# Patient Record
Sex: MALE | Race: WHITE | NOT HISPANIC OR LATINO | URBAN - METROPOLITAN AREA
[De-identification: names, ages, dates, MRNs, and addresses within clinical notes are randomized per-mention and may not be internally consistent; named-entity substitution may affect disease eponyms.]

---

## 2018-03-15 ENCOUNTER — EMERGENCY (EMERGENCY)
Facility: HOSPITAL | Age: 48
LOS: 1 days | Discharge: HOME | End: 2018-03-15
Attending: EMERGENCY MEDICINE | Admitting: EMERGENCY MEDICINE
Payer: MEDICAID

## 2018-03-15 VITALS
RESPIRATION RATE: 17 BRPM | WEIGHT: 199.96 LBS | TEMPERATURE: 99 F | SYSTOLIC BLOOD PRESSURE: 140 MMHG | HEART RATE: 115 BPM | OXYGEN SATURATION: 96 % | DIASTOLIC BLOOD PRESSURE: 90 MMHG | HEIGHT: 73 IN

## 2018-03-15 PROCEDURE — 99283 EMERGENCY DEPT VISIT LOW MDM: CPT

## 2018-03-15 PROCEDURE — 99282 EMERGENCY DEPT VISIT SF MDM: CPT

## 2018-03-15 RX ORDER — MECLIZINE HCL 12.5 MG
1 TABLET ORAL
Qty: 9 | Refills: 0
Start: 2018-03-15 | End: 2018-03-17
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
## 2018-03-15 NOTE — ED PROVIDER NOTE - PHYSICAL EXAMINATION
Attending MD Moore: A & O x 3, NAD, +mild swelling to mid nasal bridge, healed 1cm abrasion, no septal hematoma, b/l, no bony facial ttp, nontender spine, nontender chest wall, EOMI b/l, PERRL b/l; lungs CTAB, heart with reg rhythm without murmur; abdomen soft NTND; extremities with no edema; affect appropriate,  Cranial nerves two through 12 grossly intact bilaterally. 5/5 strength in bilateral upper and lower extremities. Sensation intact grossly to light touch throughout,  Finger to nose and heel to shin normal bilaterally. Gait steady.
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
## 2018-03-15 NOTE — ED PROVIDER NOTE - ATTENDING CONTRIBUTION TO CARE
Attending MD Moore:  I personally have seen and examined this patient.  Resident note reviewed and agree on plan of care and except where noted.  See HPI, PE, and MDM for details.
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
## 2018-03-15 NOTE — ED PROVIDER NOTE - PLAN OF CARE
1) Please follow-up with your primary care doctor within the next week if symptoms persist. If you would like to followup for cosmetic repair of your face, feel free to call our plastic surgery clinic (number attached).  If you cannot follow-up with your doctor(s), please return to the ED for any urgent issues.  2) If you have any worsening of symptoms or any other concerns please return to the ED immediately.  3) Please continue taking your home medications as directed.
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
## 2018-09-24 ENCOUNTER — OUTPATIENT (OUTPATIENT)
Dept: OUTPATIENT SERVICES | Facility: HOSPITAL | Age: 48
LOS: 1 days | End: 2018-09-24
Payer: MEDICAID

## 2018-09-24 DIAGNOSIS — M54.16 RADICULOPATHY, LUMBAR REGION: ICD-10-CM

## 2018-09-24 PROCEDURE — 62323 NJX INTERLAMINAR LMBR/SAC: CPT

## 2018-09-24 PROCEDURE — 77003 FLUOROGUIDE FOR SPINE INJECT: CPT

## 2018-10-11 ENCOUNTER — OUTPATIENT (OUTPATIENT)
Dept: OUTPATIENT SERVICES | Facility: HOSPITAL | Age: 48
LOS: 1 days | End: 2018-10-11
Payer: MEDICAID

## 2018-10-11 DIAGNOSIS — M54.16 RADICULOPATHY, LUMBAR REGION: ICD-10-CM

## 2018-10-11 PROCEDURE — 62323 NJX INTERLAMINAR LMBR/SAC: CPT

## 2018-10-11 PROCEDURE — 77003 FLUOROGUIDE FOR SPINE INJECT: CPT

## 2018-11-01 ENCOUNTER — OUTPATIENT (OUTPATIENT)
Dept: OUTPATIENT SERVICES | Facility: HOSPITAL | Age: 48
LOS: 1 days | End: 2018-11-01
Payer: MEDICAID

## 2018-11-01 DIAGNOSIS — M54.16 RADICULOPATHY, LUMBAR REGION: ICD-10-CM

## 2018-11-01 PROCEDURE — 77003 FLUOROGUIDE FOR SPINE INJECT: CPT

## 2018-11-01 PROCEDURE — 62323 NJX INTERLAMINAR LMBR/SAC: CPT

## 2019-10-28 ENCOUNTER — OUTPATIENT (OUTPATIENT)
Dept: OUTPATIENT SERVICES | Facility: HOSPITAL | Age: 49
LOS: 1 days | Discharge: HOME | End: 2019-10-28
Payer: COMMERCIAL

## 2019-10-28 DIAGNOSIS — M54.12 RADICULOPATHY, CERVICAL REGION: ICD-10-CM

## 2019-10-28 DIAGNOSIS — M50.10 CERVICAL DISC DISORDER WITH RADICULOPATHY, UNSPECIFIED CERVICAL REGION: ICD-10-CM

## 2019-10-28 PROCEDURE — 77003 FLUOROGUIDE FOR SPINE INJECT: CPT

## 2019-10-28 PROCEDURE — 62321 NJX INTERLAMINAR CRV/THRC: CPT

## 2019-12-09 ENCOUNTER — OUTPATIENT (OUTPATIENT)
Dept: OUTPATIENT SERVICES | Facility: HOSPITAL | Age: 49
LOS: 1 days | End: 2019-12-09
Payer: COMMERCIAL

## 2019-12-09 DIAGNOSIS — M54.12 RADICULOPATHY, CERVICAL REGION: ICD-10-CM

## 2019-12-09 PROCEDURE — 62321 NJX INTERLAMINAR CRV/THRC: CPT

## 2019-12-09 PROCEDURE — 77003 FLUOROGUIDE FOR SPINE INJECT: CPT

## 2020-01-30 ENCOUNTER — OUTPATIENT (OUTPATIENT)
Dept: OUTPATIENT SERVICES | Facility: HOSPITAL | Age: 50
LOS: 1 days | End: 2020-01-30
Payer: COMMERCIAL

## 2020-01-30 DIAGNOSIS — M54.12 RADICULOPATHY, CERVICAL REGION: ICD-10-CM

## 2020-01-30 PROCEDURE — 62321 NJX INTERLAMINAR CRV/THRC: CPT

## 2020-01-30 PROCEDURE — 77003 FLUOROGUIDE FOR SPINE INJECT: CPT

## 2020-04-17 ENCOUNTER — EMERGENCY (EMERGENCY)
Facility: HOSPITAL | Age: 50
LOS: 1 days | Discharge: HOME | End: 2020-04-17
Attending: EMERGENCY MEDICINE
Payer: COMMERCIAL

## 2020-04-17 VITALS
HEART RATE: 93 BPM | TEMPERATURE: 98 F | OXYGEN SATURATION: 100 % | DIASTOLIC BLOOD PRESSURE: 82 MMHG | RESPIRATION RATE: 18 BRPM | HEIGHT: 73 IN | WEIGHT: 184.97 LBS | SYSTOLIC BLOOD PRESSURE: 123 MMHG

## 2020-04-17 VITALS
OXYGEN SATURATION: 100 % | HEART RATE: 83 BPM | SYSTOLIC BLOOD PRESSURE: 128 MMHG | RESPIRATION RATE: 16 BRPM | DIASTOLIC BLOOD PRESSURE: 99 MMHG

## 2020-04-17 PROCEDURE — 99283 EMERGENCY DEPT VISIT LOW MDM: CPT | Mod: 25

## 2020-04-17 PROCEDURE — 96372 THER/PROPH/DIAG INJ SC/IM: CPT

## 2020-04-17 PROCEDURE — 99284 EMERGENCY DEPT VISIT MOD MDM: CPT

## 2020-04-17 RX ORDER — OXYCODONE HYDROCHLORIDE 5 MG/1
1 TABLET ORAL
Qty: 8 | Refills: 0
Start: 2020-04-17 | End: 2020-04-18

## 2020-04-17 RX ORDER — DIAZEPAM 5 MG
5 TABLET ORAL ONCE
Refills: 0 | Status: DISCONTINUED | OUTPATIENT
Start: 2020-04-17 | End: 2020-04-17

## 2020-04-17 RX ORDER — KETOROLAC TROMETHAMINE 30 MG/ML
15 SYRINGE (ML) INJECTION ONCE
Refills: 0 | Status: DISCONTINUED | OUTPATIENT
Start: 2020-04-17 | End: 2020-04-17

## 2020-04-17 RX ORDER — OXYCODONE HYDROCHLORIDE 5 MG/1
5 TABLET ORAL ONCE
Refills: 0 | Status: DISCONTINUED | OUTPATIENT
Start: 2020-04-17 | End: 2020-04-17

## 2020-04-17 RX ORDER — IBUPROFEN 200 MG
400 TABLET ORAL ONCE
Refills: 0 | Status: DISCONTINUED | OUTPATIENT
Start: 2020-04-17 | End: 2020-04-17

## 2020-04-17 RX ORDER — LIDOCAINE 4 G/100G
1 CREAM TOPICAL ONCE
Refills: 0 | Status: COMPLETED | OUTPATIENT
Start: 2020-04-17 | End: 2020-04-17

## 2020-04-17 RX ADMIN — Medication 5 MILLIGRAM(S): at 16:03

## 2020-04-17 RX ADMIN — OXYCODONE HYDROCHLORIDE 5 MILLIGRAM(S): 5 TABLET ORAL at 16:36

## 2020-04-17 RX ADMIN — Medication 15 MILLIGRAM(S): at 16:12

## 2020-04-17 RX ADMIN — LIDOCAINE 1 PATCH: 4 CREAM TOPICAL at 16:02

## 2020-04-17 NOTE — ED PROVIDER NOTE - ATTENDING CONTRIBUTION TO CARE
pt transported to L&D via wheelchair with discharge instructions given by MD in ER
RGUJRAL 50yo male hx HTN, cervical herniated disc followed outpt by pain management, evaluated by spine choosing the non surgical route presents with L side neck pain and numbness x several days. States he usually gets an epidural for such symptoms but due to covid restrictions, he has not been able to get an appt. Denies any arm weakness, change in bladder or bowel. No fever.  ON exam, Patient is awake,alert,oriented x 3. Patient is well appearing and in no acute distress. Neck + paraspinal tenderness.  Patient's chest is clear to ausculation, +s1s2. Abdomen is soft nd/nt +BS. Extremity with no swelling or calf tenderness. B/L UE normal sensation 5/5 strength. + radial pulse.  Pt neuro intact, pain meds and re eval. Discussed w pt regarding pain management and spine follow up. MRI reviewed.

## 2020-04-17 NOTE — ED PROVIDER NOTE - OBJECTIVE STATEMENT
48yo male pt with PMHx of HTN, Cervical Herniated Disc (on f/u with pain management and Gabapentin/ Flexeril), ambulatory c/o worsening neck pain, radiating ot left arm. Pt stated he's had chronic neck pain and the pain's gradually worsen since several days. He had epidural injection by his pain management  one month ago and unable to get another injection due to covid pandemic. Denies fever, chills, cough or recent cold symptoms. Denies CP/SOB/ABD pain or N/V/D. Denies weakness to extremities. Denies urinary problems.

## 2020-04-17 NOTE — ED PROVIDER NOTE - NSFOLLOWUPINSTRUCTIONS_ED_ALL_ED_FT
Hydrate.  Keep continue your current medications, including Gabapentin/ flexeril as directed.  Oxycodone 1 tablet every 6hours for severe pain with cautions of drowsiness and constipation.  Stool softener as needed.  Salonpas with Lidocaine patch (over the count) to pain area as needed.   Follow up with your pain management Dr. call Monday for appointment in 2days.   Return for any concerns or worsening [id number].

## 2020-04-17 NOTE — ED PROVIDER NOTE - PATIENT PORTAL LINK FT
You can access the FollowMyHealth Patient Portal offered by [facility] by registering at the following website: [email or web address]. By joining [employer]’s FollowMyHealth portal, you will also be able to view your health information using other applications (apps) compatible with our system.

## 2020-04-17 NOTE — ED ADULT NURSE NOTE - OBJECTIVE STATEMENT
48 y/o male presents c/o neck pain that radiates to his left shoulder and woke him up from sleep. Numbness and tingling to left hand. Hx of this happening chronically and is unable to see his pain management MD for epidurals. Denies chest pain, sob, ha, n/v/d, abdominal pain, f/c, urinary symptoms, hematuria. A&Ox4, vss, skin warm dry and intact, MAEx4, lungs CTA, abd soft nondistended. Patient's bed in the lowest position, explained plan of care to patient and family members. Will continue to reassess.

## 2020-04-17 NOTE — ED PROVIDER NOTE - PHYSICAL EXAMINATION
NAD, VSS, Afebrile, + Generalized cervical midline tender with left para cervical/ cervical trapezium tender. Lungs clear. ABD soft, non tender. No focal neuro deficit with intact sensory and 5/5 strength.

## 2020-11-01 NOTE — ED PROVIDER NOTE - GASTROINTESTINAL, MLM
[facility]  Transfer Triage Note    Date of call: 11/01/20  Time of call: 1:32 PM    Is pandemic COVID-19 a concern? YES:   1.  Travel history/exposure history (select all that apply): other   2.  Vitals:   Temp:  [98.7  F (37.1  C)-103.1  F (39.5  C)] 98.7  F (37.1  C)  Pulse:  [65-98] 79  Resp:  [18] 18  BP: (114-156)/(68-86) 135/86  SpO2:  [89 %-97 %] 94 %  3.  On Oxygen? (select one option):  NC L/minute 2  4.  History of lung disease or active smoking (or other risk factors for death/respiratory failure?: Yes: SCOTT, obesity, HTN  5.  Type of bed requested (select one option): med/surg  6.  Other Isolation needed (select all that apply): COVID precautions  7.  Has the patient been added to the shared patient list 'COVID'?  Yes      Reason for transfer: COVID  Diagnosis: COVID 19    Outside Records: Available  Additional records requested to be faxed to [phone].    Stability of Patient: Patient is vitally stable, with no critical labs, and will likely remain stable throughout the transfer process  ICU: No  Telemetry needed: No    Expected Time of Arrival for Transfer: 0-8 hours    Arrival Location:  [facility] [address] [place] [address] Phone: [phone]    Recommendations for Management and Stabilization: ECG and repeat troponin      Eliseo Oneal is a 56 year old male with history of obesity, hypertension, and SCOTT who presented to the [facility] ED on 10/30 with 4 days of fever to 103.1, cough, and SOB and other associated symptoms including fatigue and upset stomach. His wife did have COVID infection In July. CT in the ED showed bilateral infiltrates consistent with pneumonitits. Labs on presentation significant for leukopenia with WBC 4.5, d-dimer 2.2, GFR 66, and calcium 8.2 with albuminemia to 2.6 and low total protein. COVID PCR positive on 10/30. 10/31 troponin was 0.047, AST 53, WBC 3.8, D-dimer 1.7. Today he is hemodynamically stable and cooperative. Currently on 2L  oxygen. Labs 11/1 significant for improved leukopenia WBC 6, GFR 77, D-dimer 1.2. Not currently started on Remdesivir. Very mild troponin elevation on admission to 0.047 (upper limit of normal w lab at transferring hospital is 0.045), Pt has been chest pain free entire hospitalization per transferring provider.    Requested EKG and repeat troponin and repeat EKG reported by transferring provider as without concerning findings or evidence of ischemia and repeat troponin returned within normal limits. Pt is  appropriate for transfer to [place] to med/surg bed without tele.    Patient signed out to Dr. Michael by phone.    Lissa Stark, MS4  [employer]    I have edited the above note.   Jasmin Spain MD           Abdomen soft, non-tender, no guarding.

## 2022-08-25 ENCOUNTER — TRANSCRIPTION ENCOUNTER (OUTPATIENT)
Age: 52
End: 2022-08-25

## 2022-08-25 ENCOUNTER — OUTPATIENT (OUTPATIENT)
Dept: OUTPATIENT SERVICES | Facility: HOSPITAL | Age: 52
LOS: 1 days | End: 2022-08-25
Payer: MEDICAID

## 2022-08-25 VITALS
HEIGHT: 71.5 IN | HEART RATE: 89 BPM | TEMPERATURE: 97 F | DIASTOLIC BLOOD PRESSURE: 87 MMHG | WEIGHT: 197.09 LBS | RESPIRATION RATE: 14 BRPM | SYSTOLIC BLOOD PRESSURE: 124 MMHG | OXYGEN SATURATION: 97 %

## 2022-08-25 DIAGNOSIS — K43.9 VENTRAL HERNIA WITHOUT OBSTRUCTION OR GANGRENE: ICD-10-CM

## 2022-08-25 DIAGNOSIS — Z98.890 OTHER SPECIFIED POSTPROCEDURAL STATES: Chronic | ICD-10-CM

## 2022-08-25 DIAGNOSIS — M51.24 OTHER INTERVERTEBRAL DISC DISPLACEMENT, THORACIC REGION: Chronic | ICD-10-CM

## 2022-08-25 DIAGNOSIS — K42.9 UMBILICAL HERNIA WITHOUT OBSTRUCTION OR GANGRENE: ICD-10-CM

## 2022-08-25 DIAGNOSIS — Z86.79 PERSONAL HISTORY OF OTHER DISEASES OF THE CIRCULATORY SYSTEM: Chronic | ICD-10-CM

## 2022-08-25 DIAGNOSIS — M50.20 OTHER CERVICAL DISC DISPLACEMENT, UNSPECIFIED CERVICAL REGION: Chronic | ICD-10-CM

## 2022-08-25 DIAGNOSIS — M51.26 OTHER INTERVERTEBRAL DISC DISPLACEMENT, LUMBAR REGION: Chronic | ICD-10-CM

## 2022-08-25 DIAGNOSIS — Z01.818 ENCOUNTER FOR OTHER PREPROCEDURAL EXAMINATION: ICD-10-CM

## 2022-08-25 LAB
ALBUMIN SERPL ELPH-MCNC: 4 G/DL — SIGNIFICANT CHANGE UP (ref 3.3–5)
ALP SERPL-CCNC: 79 U/L — SIGNIFICANT CHANGE UP (ref 30–120)
ALT FLD-CCNC: 77 U/L DA — HIGH (ref 10–60)
ANION GAP SERPL CALC-SCNC: 4 MMOL/L — LOW (ref 5–17)
APTT BLD: 30.5 SEC — SIGNIFICANT CHANGE UP (ref 27.5–35.5)
AST SERPL-CCNC: 34 U/L — SIGNIFICANT CHANGE UP (ref 10–40)
BILIRUB SERPL-MCNC: 1 MG/DL — SIGNIFICANT CHANGE UP (ref 0.2–1.2)
BUN SERPL-MCNC: 20 MG/DL — SIGNIFICANT CHANGE UP (ref 7–23)
CALCIUM SERPL-MCNC: 9.3 MG/DL — SIGNIFICANT CHANGE UP (ref 8.4–10.5)
CHLORIDE SERPL-SCNC: 101 MMOL/L — SIGNIFICANT CHANGE UP (ref 96–108)
CO2 SERPL-SCNC: 32 MMOL/L — HIGH (ref 22–31)
CREAT SERPL-MCNC: 0.99 MG/DL — SIGNIFICANT CHANGE UP (ref 0.5–1.3)
EGFR: 91 ML/MIN/1.73M2 — SIGNIFICANT CHANGE UP
GLUCOSE SERPL-MCNC: 101 MG/DL — HIGH (ref 70–99)
HCT VFR BLD CALC: 43.2 % — SIGNIFICANT CHANGE UP (ref 39–50)
HGB BLD-MCNC: 14.2 G/DL — SIGNIFICANT CHANGE UP (ref 13–17)
INR BLD: 0.85 RATIO — LOW (ref 0.88–1.16)
MCHC RBC-ENTMCNC: 32.3 PG — SIGNIFICANT CHANGE UP (ref 27–34)
MCHC RBC-ENTMCNC: 32.9 GM/DL — SIGNIFICANT CHANGE UP (ref 32–36)
MCV RBC AUTO: 98.4 FL — SIGNIFICANT CHANGE UP (ref 80–100)
NRBC # BLD: 0 /100 WBCS — SIGNIFICANT CHANGE UP (ref 0–0)
PLATELET # BLD AUTO: 320 K/UL — SIGNIFICANT CHANGE UP (ref 150–400)
POTASSIUM SERPL-MCNC: 5.1 MMOL/L — SIGNIFICANT CHANGE UP (ref 3.5–5.3)
POTASSIUM SERPL-SCNC: 5.1 MMOL/L — SIGNIFICANT CHANGE UP (ref 3.5–5.3)
PROT SERPL-MCNC: 7.4 G/DL — SIGNIFICANT CHANGE UP (ref 6–8.3)
PROTHROM AB SERPL-ACNC: 9.8 SEC — LOW (ref 10.5–13.4)
RBC # BLD: 4.39 M/UL — SIGNIFICANT CHANGE UP (ref 4.2–5.8)
RBC # FLD: 12 % — SIGNIFICANT CHANGE UP (ref 10.3–14.5)
SODIUM SERPL-SCNC: 137 MMOL/L — SIGNIFICANT CHANGE UP (ref 135–145)
WBC # BLD: 5.97 K/UL — SIGNIFICANT CHANGE UP (ref 3.8–10.5)
WBC # FLD AUTO: 5.97 K/UL — SIGNIFICANT CHANGE UP (ref 3.8–10.5)

## 2022-08-25 PROCEDURE — 93005 ELECTROCARDIOGRAM TRACING: CPT

## 2022-08-25 PROCEDURE — 85027 COMPLETE CBC AUTOMATED: CPT

## 2022-08-25 PROCEDURE — G0463: CPT

## 2022-08-25 PROCEDURE — 36415 COLL VENOUS BLD VENIPUNCTURE: CPT

## 2022-08-25 PROCEDURE — 85730 THROMBOPLASTIN TIME PARTIAL: CPT

## 2022-08-25 PROCEDURE — 93010 ELECTROCARDIOGRAM REPORT: CPT

## 2022-08-25 PROCEDURE — 80053 COMPREHEN METABOLIC PANEL: CPT

## 2022-08-25 PROCEDURE — 85610 PROTHROMBIN TIME: CPT

## 2022-08-25 RX ORDER — CHLORHEXIDINE GLUCONATE 213 G/1000ML
1 SOLUTION TOPICAL ONCE
Refills: 0 | Status: DISCONTINUED | OUTPATIENT
Start: 2022-09-08 | End: 2022-09-22

## 2022-08-25 RX ORDER — CEFAZOLIN SODIUM 1 G
2000 VIAL (EA) INJECTION ONCE
Refills: 0 | Status: DISCONTINUED | OUTPATIENT
Start: 2022-09-08 | End: 2022-09-22

## 2022-08-25 RX ORDER — ASPIRIN/CALCIUM CARB/MAGNESIUM 324 MG
1 TABLET ORAL
Qty: 0 | Refills: 0 | DISCHARGE

## 2022-08-25 NOTE — ASU DISCHARGE PLAN (ADULT/PEDIATRIC) - NS MD DC FALL RISK RISK
For information on Fall & Injury Prevention, visit: [email or web address] OR  [email or web address] OR  https://www.cdc.gov/steadi/patient.html

## 2022-08-25 NOTE — H&P PST ADULT - NS MD HP HEP C STATUS
Patient is due for CBC and CMP as per NSAID protocol. He will come in for lab work 4/9/22. Refill sent for 30 days.    Negative

## 2022-08-25 NOTE — H&P PST ADULT - HISTORY OF PRESENT ILLNESS
51 yo male presents with 1 1/2 year history of known umbilical hernia. He denies change in size but states that the area in now tender to touch. He denies using any pain relief measures for this. 53 yo male presents with 1 1/2 year history of known umbilical hernia. He denies change in size but states that the area in now tender to touch. He denies using any pain relief measures for this. He has chronic back pain and takes medication for that.

## 2022-08-25 NOTE — ASU DISCHARGE PLAN (ADULT/PEDIATRIC) - ASU DC SPECIAL INSTRUCTIONSFT
REST! Apply ice packs to incision sites 20 mins on, 20 mins off every 4 hours for the first 24 hours.    If taking narcotic pain medications, may take COLACE (OTC stool softener) as directed to prevent constipation.    Increase ambulation and utilize incentive spirometer as directed.    Please call Dr. BUCK Interiano's office ([phone]) to schedule a follow-up appointment to be seen in 7-10 days.

## 2022-08-25 NOTE — H&P PST ADULT - PROBLEM SELECTOR PLAN 1
ventral hernia repair with mesh. medical clearance requested. obtain last note from neurology and pain management. instructed to stop aspirin, CoQ10, turmeric, MVI and omega-3 1 week prior to surgery. instructed to speak to PCP about aspirin use and increased bruising on arms. surgical wash instructions reviewed and verbalized understanding. covid PCR appt. confirmed for 9/6/22 at 1200.

## 2022-08-25 NOTE — H&P PST ADULT - NSICDXFAMILYHX_GEN_ALL_CORE_FT
FAMILY HISTORY:  Father  Still living? No  Family history of hypertension, Age at diagnosis: Age Unknown    Mother  Still living? Yes, Estimated age: 81-90  Family history of hypertension, Age at diagnosis: Age Unknown

## 2022-08-25 NOTE — H&P PST ADULT - NSANTHOSAYNRD_GEN_A_CORE
neck inches/No. SANTOS screening performed.  STOP BANG Legend: 0-2 = LOW Risk; 3-4 = INTERMEDIATE Risk; 5-8 = HIGH Risk neck 16.5 inches/No. SANTOS screening performed.  STOP BANG Legend: 0-2 = LOW Risk; 3-4 = INTERMEDIATE Risk; 5-8 = HIGH Risk

## 2022-08-25 NOTE — H&P PST ADULT - VENOUS THROMBOEMBOLISM HISTORY
MEDICARE WELLNESS VISIT NOTE      HISTORY OF PRESENT ILLNESS:   Pablo Lucas presents for his First Annual Medicare Wellness Visit.   He has complaints or concerns which include   Chief Complaint   Patient presents with   • ER F/U     ER 9/17/17  had xrays, no fxs - dropped mantle on both feet.  left toes bruised,   right big toe red and oozing.    • Medicare Wellness Visit     initial MWV   .      Patient Care Team:  Dario Linder MD as PCP - General (Internal Medicine)        Patient Active Problem List    Diagnosis Date Noted   • Anxiety 09/22/2014     Priority: Low   • Hyperlipidemia 09/22/2014     Priority: Low         Past Medical History:   Diagnosis Date   • High cholesterol          Past Surgical History:   Procedure Laterality Date   • NO PAST SURGERIES           Social History   Substance Use Topics   • Smoking status: Never Smoker   • Smokeless tobacco: Never Used   • Alcohol use No     Drug use:    Drug Use:    No              Family History - Reviewed    Current Outpatient Prescriptions   Medication Sig Dispense Refill   • montelukast (SINGULAIR) 10 MG tablet TAKE 1 TABLET BY MOUTH EVERY EVENING 30 tablet 5   • metFORMIN (GLUCOPHAGE) 500 MG tablet TAKE 1 TABLET BY MOUTH TWICE DAILY WITH MEALS 60 tablet 1   • loratadine (CLARITIN) 10 MG tablet TAKE 1 TABLET BY MOUTH DAILY 30 tablet 5   • simvastatin (ZOCOR) 20 MG tablet TAKE 1 TABLET BY MOUTH DAILY 90 tablet 0   • losartan (COZAAR) 50 MG tablet Take 1 tablet by mouth daily. 90 tablet 1   • albuterol 108 (90 BASE) MCG/ACT inhaler Inhale 2 puffs into the lungs every 4 hours as needed for Shortness of Breath or Wheezing. 1 Inhaler 6   • naproxen sodium (ALEVE) 220 MG tablet Take 1 tablet by mouth 2 times daily (with meals). 60 tablet 0   • fluticasone (FLONASE) 50 MCG/ACT nasal spray Spray 1 spray in each nostril daily. 16 g 6     No current facility-administered medications for this visit.         Medicare Health Risk Assessment in electronic  health record reviewed. The following items were identified and addressed:      Vision and hearing screens documented:    Advanced Directive: Patient doesn't have an Advance Directives document, and is not interested in additional information  Cognitive Assessment: no evidence of cognitive dysfunction by direct observation      See orders.   See Patient Instructions section.   No Follow-up on file.  Do you have an Advanced Directive? NO    Would you like additional information on Advanced Directives? No has the information   How would you rate your health in general? good    What word best describes your health? healthy    Are you physically able to do what you want to do every day? always    In the past 2 weeks, have you felt down, depressed or hopeless? (PHQ2) no    Felt little interest or pleasure in doing things? (PHQ2) no    In the past 4 weeks, has your physical or emotional health limited your social activities with others? no    Have you recently been bothered by sexual problems? no    Have you recently been bothered by tiredness or fatigue? no    Have you recently been bothered by bodily pain? no    Have you recently been bothered by stress/feeling overwhelmed? no    Have you recently been bothered by anger/frustration? no    Do you have a problem taking your medications? no    Do you eat less than 2-3 meals a day? no    Do you follow a special diet? no    Do you need help to prepare your meals? no    Do you need help to feed yourself? no    Do you have any problems with your teeth or dentures? no    Do you need help to bathe yourself? no    Do you need help to dress yourself? no    Do you need help to move in or out of bed or a chair? no    Do you need help to use the toilet? no    Do you have trouble controlling your bowels? no    Do you regularly get physical activity? yes    Have you had any recent falls or are you concerned about falling? no    Do you need help to get to places outside of walking  distance? no    Do you need help to go shopping for groceries or clothes? no    Do you need help to do your housework or laundry? no    Do you need help to handle your own money? no    Do you feel safe at home? yes    Do you have questions or concerns about your hearing? no    Do you have a problem using the telephone? no    Do you wear your seat belt when in a vehicle? yes         (0) indicator not present

## 2022-08-25 NOTE — H&P PST ADULT - MUSCULOSKELETAL
ROM intact/no joint swelling/no joint erythema/no joint warmth/no calf tenderness/normal gait/strength 5/5 bilateral upper extremities/strength 5/5 bilateral lower extremities/back exam details…

## 2022-08-25 NOTE — H&P PST ADULT - NSICDXPASTSURGICALHX_GEN_ALL_CORE_FT
PAST SURGICAL HISTORY:  H/O arthroscopy of left knee 2021    H/O arthroscopy of right knee 2020    H/O right inguinal hernia repair 1995    History of cystoscopy stone extraction 2011    History of Mohs surgery for squamous cell carcinoma of skin x2 April 2021 forehead and April 2022 left hand

## 2022-08-25 NOTE — ASU DISCHARGE PLAN (ADULT/PEDIATRIC) - CARE PROVIDER_API CALL
Davi Interiano (MD)  Surgery; Surgical Critical Care  [facility], [address] [place] [address]  Phone: [phone]  Fax: [phone]  Follow Up Time:

## 2022-08-25 NOTE — H&P PST ADULT - NSICDXPASTMEDICALHX_GEN_ALL_CORE_FT
PAST MEDICAL HISTORY:  Anxiety     Cervical herniated disc x5, C3-4, C4-5, C5-6, C6-7, C7-T1    Concussion x 12,  in the past all from sports related injuries    DDD (degenerative disc disease), lumbar     Elevated liver enzymes history of, related to percocet use    History of 2019 novel coronavirus disease (COVID-19) December 2020 and December 2021, mild symptoms    History of headache     History of scoliosis     History of skin cancer squamos cell    History of thyroid nodule followed with ultrasound    History of vertigo     Hypertension     Liver cyst incidental finding    Lumbar herniated disc x5, L1-2. L2-3, L3-4, L4-5 and L5-S1    Osteoarthritis     Post concussion syndrome     Renal Calculi 2011 had a cystoscopy and since then has passed 2 on his own    S/P epidural steroid injection multiple, last August 8/10/22    Thoracic disc herniation x2, T7 and T8 and disc bulge at T11    Umbilical hernia      PAST MEDICAL HISTORY:  Anxiety     Cervical herniated disc x5, C3-4, C4-5, C5-6, C6-7, C7-T1    Concussion x 12,  in the past all from sports related injuries    DDD (degenerative disc disease), lumbar     Elevated liver enzymes history of, related to percocet use    History of 2019 novel coronavirus disease (COVID-19) December 2020 and December 2021, mild symptoms    History of headache     History of scoliosis     History of skin cancer squamos cell    History of thyroid nodule followed with ultrasound    History of vertigo     Hypertension     Liver cyst incidental finding    Lumbar herniated disc x5, L1-2. L2-3, L3-4, L4-5 and L5-S1, medrol dose pack June 2022    Osteoarthritis     Post concussion syndrome     Renal Calculi 2011 had a cystoscopy and since then has passed 2 on his own    S/P epidural steroid injection multiple, last August 8/10/22    Thoracic disc herniation x2, T7 and T8 and disc bulge at T11    Umbilical hernia

## 2022-08-25 NOTE — H&P PST ADULT - SKIN/BREAST COMMENTS
multiple bruises on upper extremities, patient states that he has noted them since starting 325 mg aspirin for pain management

## 2022-09-06 ENCOUNTER — OUTPATIENT (OUTPATIENT)
Dept: OUTPATIENT SERVICES | Facility: HOSPITAL | Age: 52
LOS: 1 days | End: 2022-09-06
Payer: MEDICAID

## 2022-09-06 DIAGNOSIS — Z98.890 OTHER SPECIFIED POSTPROCEDURAL STATES: Chronic | ICD-10-CM

## 2022-09-06 DIAGNOSIS — Z20.828 CONTACT WITH AND (SUSPECTED) EXPOSURE TO OTHER VIRAL COMMUNICABLE DISEASES: ICD-10-CM

## 2022-09-06 LAB — SARS-COV-2 RNA SPEC QL NAA+PROBE: SIGNIFICANT CHANGE UP

## 2022-09-06 PROCEDURE — U0003: CPT

## 2022-09-06 PROCEDURE — U0005: CPT

## 2022-09-07 ENCOUNTER — TRANSCRIPTION ENCOUNTER (OUTPATIENT)
Age: 52
End: 2022-09-07

## 2022-09-08 ENCOUNTER — RESULT REVIEW (OUTPATIENT)
Age: 52
End: 2022-09-08

## 2022-09-08 ENCOUNTER — OUTPATIENT (OUTPATIENT)
Dept: OUTPATIENT SERVICES | Facility: HOSPITAL | Age: 52
LOS: 1 days | End: 2022-09-08
Payer: MEDICAID

## 2022-09-08 ENCOUNTER — TRANSCRIPTION ENCOUNTER (OUTPATIENT)
Age: 52
End: 2022-09-08

## 2022-09-08 VITALS
RESPIRATION RATE: 18 BRPM | HEART RATE: 88 BPM | SYSTOLIC BLOOD PRESSURE: 129 MMHG | DIASTOLIC BLOOD PRESSURE: 82 MMHG | OXYGEN SATURATION: 96 %

## 2022-09-08 VITALS
TEMPERATURE: 98 F | SYSTOLIC BLOOD PRESSURE: 112 MMHG | HEART RATE: 80 BPM | HEIGHT: 71 IN | WEIGHT: 191.14 LBS | RESPIRATION RATE: 15 BRPM | DIASTOLIC BLOOD PRESSURE: 71 MMHG | OXYGEN SATURATION: 100 %

## 2022-09-08 DIAGNOSIS — Z98.890 OTHER SPECIFIED POSTPROCEDURAL STATES: Chronic | ICD-10-CM

## 2022-09-08 DIAGNOSIS — K43.9 VENTRAL HERNIA WITHOUT OBSTRUCTION OR GANGRENE: ICD-10-CM

## 2022-09-08 PROCEDURE — 49560: CPT | Mod: AS

## 2022-09-08 PROCEDURE — C1781: CPT

## 2022-09-08 PROCEDURE — 49560: CPT

## 2022-09-08 PROCEDURE — 88302 TISSUE EXAM BY PATHOLOGIST: CPT | Mod: 26

## 2022-09-08 PROCEDURE — 49568: CPT

## 2022-09-08 PROCEDURE — 88302 TISSUE EXAM BY PATHOLOGIST: CPT

## 2022-09-08 PROCEDURE — 49568: CPT | Mod: AS

## 2022-09-08 DEVICE — MESH HERNIA VENTRALEX ST CIR 2.5IN MED: Type: IMPLANTABLE DEVICE | Status: FUNCTIONAL

## 2022-09-08 RX ORDER — SODIUM CHLORIDE 9 MG/ML
1000 INJECTION, SOLUTION INTRAVENOUS
Refills: 0 | Status: DISCONTINUED | OUTPATIENT
Start: 2022-09-08 | End: 2022-09-08

## 2022-09-08 RX ORDER — HYDROMORPHONE HYDROCHLORIDE 2 MG/ML
0.5 INJECTION INTRAMUSCULAR; INTRAVENOUS; SUBCUTANEOUS
Refills: 0 | Status: DISCONTINUED | OUTPATIENT
Start: 2022-09-08 | End: 2022-09-08

## 2022-09-08 RX ORDER — ONDANSETRON 8 MG/1
4 TABLET, FILM COATED ORAL ONCE
Refills: 0 | Status: DISCONTINUED | OUTPATIENT
Start: 2022-09-08 | End: 2022-09-08

## 2022-09-08 RX ORDER — HYDROMORPHONE HYDROCHLORIDE 2 MG/ML
0.25 INJECTION INTRAMUSCULAR; INTRAVENOUS; SUBCUTANEOUS
Refills: 0 | Status: DISCONTINUED | OUTPATIENT
Start: 2022-09-08 | End: 2022-09-08

## 2022-09-08 RX ORDER — OXYCODONE HYDROCHLORIDE 5 MG/1
5 TABLET ORAL ONCE
Refills: 0 | Status: DISCONTINUED | OUTPATIENT
Start: 2022-09-08 | End: 2022-09-08

## 2022-09-08 RX ADMIN — HYDROMORPHONE HYDROCHLORIDE 0.5 MILLIGRAM(S): 2 INJECTION INTRAMUSCULAR; INTRAVENOUS; SUBCUTANEOUS at 10:41

## 2022-09-08 RX ADMIN — HYDROMORPHONE HYDROCHLORIDE 0.5 MILLIGRAM(S): 2 INJECTION INTRAMUSCULAR; INTRAVENOUS; SUBCUTANEOUS at 11:17

## 2022-09-08 RX ADMIN — HYDROMORPHONE HYDROCHLORIDE 0.5 MILLIGRAM(S): 2 INJECTION INTRAMUSCULAR; INTRAVENOUS; SUBCUTANEOUS at 11:01

## 2022-09-08 RX ADMIN — SODIUM CHLORIDE 75 MILLILITER(S): 9 INJECTION, SOLUTION INTRAVENOUS at 11:02

## 2022-09-08 RX ADMIN — HYDROMORPHONE HYDROCHLORIDE 0.5 MILLIGRAM(S): 2 INJECTION INTRAMUSCULAR; INTRAVENOUS; SUBCUTANEOUS at 10:59

## 2022-09-08 NOTE — ASU PATIENT PROFILE, ADULT - NSICDXPASTMEDICALHX_GEN_ALL_CORE_FT
PAST MEDICAL HISTORY:  Anxiety     Cervical herniated disc x5, C3-4, C4-5, C5-6, C6-7, C7-T1    Concussion x 12,  in the past all from sports related injuries    DDD (degenerative disc disease), lumbar     Elevated liver enzymes history of, related to percocet use    History of 2019 novel coronavirus disease (COVID-19) December 2020 and December 2021, mild symptoms    History of headache     History of scoliosis     History of skin cancer squamos cell    History of thyroid nodule followed with ultrasound    History of vertigo     Hypertension     Liver cyst incidental finding    Lumbar herniated disc x5, L1-2. L2-3, L3-4, L4-5 and L5-S1, medrol dose pack June 2022    Osteoarthritis     Post concussion syndrome     Renal Calculi 2011 had a cystoscopy and since then has passed 2 on his own    S/P epidural steroid injection multiple, last August 8/10/22    Thoracic disc herniation x2, T7 and T8 and disc bulge at T11    Umbilical hernia

## 2022-09-08 NOTE — ASU PATIENT PROFILE, ADULT - FALL HARM RISK - UNIVERSAL INTERVENTIONS
Bed in lowest position, wheels locked, appropriate side rails in place/Call bell, personal items and telephone in reach/Instruct patient to call for assistance before getting out of bed or chair/Non-slip footwear when patient is out of bed/[place] to call system/Physically safe environment - no spills, clutter or unnecessary equipment/Purposeful Proactive Rounding/Room/bathroom lighting operational, light cord in reach

## 2022-09-12 LAB — SURGICAL PATHOLOGY STUDY: SIGNIFICANT CHANGE UP

## 2022-09-18 ENCOUNTER — EMERGENCY (EMERGENCY)
Facility: HOSPITAL | Age: 52
LOS: 1 days | Discharge: HOME | End: 2022-09-18
Attending: EMERGENCY MEDICINE | Admitting: EMERGENCY MEDICINE
Payer: MEDICAID

## 2022-09-18 VITALS
HEART RATE: 64 BPM | DIASTOLIC BLOOD PRESSURE: 87 MMHG | RESPIRATION RATE: 18 BRPM | TEMPERATURE: 98 F | OXYGEN SATURATION: 99 % | SYSTOLIC BLOOD PRESSURE: 123 MMHG

## 2022-09-18 VITALS
OXYGEN SATURATION: 99 % | TEMPERATURE: 98 F | HEART RATE: 88 BPM | SYSTOLIC BLOOD PRESSURE: 124 MMHG | DIASTOLIC BLOOD PRESSURE: 88 MMHG | RESPIRATION RATE: 18 BRPM | HEIGHT: 71 IN | WEIGHT: 190.04 LBS

## 2022-09-18 DIAGNOSIS — Z98.890 OTHER SPECIFIED POSTPROCEDURAL STATES: Chronic | ICD-10-CM

## 2022-09-18 PROBLEM — M51.24 OTHER INTERVERTEBRAL DISC DISPLACEMENT, THORACIC REGION: Chronic | Status: ACTIVE | Noted: 2022-08-25

## 2022-09-18 PROBLEM — Z86.39 PERSONAL HISTORY OF OTHER ENDOCRINE, NUTRITIONAL AND METABOLIC DISEASE: Chronic | Status: ACTIVE | Noted: 2022-08-25

## 2022-09-18 PROBLEM — Z87.39 PERSONAL HISTORY OF OTHER DISEASES OF THE MUSCULOSKELETAL SYSTEM AND CONNECTIVE TISSUE: Chronic | Status: ACTIVE | Noted: 2022-08-25

## 2022-09-18 PROBLEM — Z92.241 PERSONAL HISTORY OF SYSTEMIC STEROID THERAPY: Chronic | Status: ACTIVE | Noted: 2022-08-25

## 2022-09-18 PROBLEM — Z87.898 PERSONAL HISTORY OF OTHER SPECIFIED CONDITIONS: Chronic | Status: ACTIVE | Noted: 2022-08-25

## 2022-09-18 PROBLEM — R74.8 ABNORMAL LEVELS OF OTHER SERUM ENZYMES: Chronic | Status: ACTIVE | Noted: 2022-08-25

## 2022-09-18 PROBLEM — Z85.828 PERSONAL HISTORY OF OTHER MALIGNANT NEOPLASM OF SKIN: Chronic | Status: ACTIVE | Noted: 2022-08-25

## 2022-09-18 PROBLEM — K42.9 UMBILICAL HERNIA WITHOUT OBSTRUCTION OR GANGRENE: Chronic | Status: ACTIVE | Noted: 2022-08-25

## 2022-09-18 PROBLEM — M19.90 UNSPECIFIED OSTEOARTHRITIS, UNSPECIFIED SITE: Chronic | Status: ACTIVE | Noted: 2022-08-25

## 2022-09-18 PROBLEM — I10 ESSENTIAL (PRIMARY) HYPERTENSION: Chronic | Status: ACTIVE | Noted: 2022-08-25

## 2022-09-18 PROBLEM — M51.26 OTHER INTERVERTEBRAL DISC DISPLACEMENT, LUMBAR REGION: Chronic | Status: ACTIVE | Noted: 2022-08-25

## 2022-09-18 PROBLEM — F07.81 POSTCONCUSSIONAL SYNDROME: Chronic | Status: ACTIVE | Noted: 2022-08-25

## 2022-09-18 PROBLEM — K76.89 OTHER SPECIFIED DISEASES OF LIVER: Chronic | Status: ACTIVE | Noted: 2022-08-25

## 2022-09-18 PROBLEM — Z86.16 PERSONAL HISTORY OF COVID-19: Chronic | Status: ACTIVE | Noted: 2022-08-25

## 2022-09-18 PROBLEM — M50.20 OTHER CERVICAL DISC DISPLACEMENT, UNSPECIFIED CERVICAL REGION: Chronic | Status: ACTIVE | Noted: 2022-08-25

## 2022-09-18 PROBLEM — M51.36 OTHER INTERVERTEBRAL DISC DEGENERATION, LUMBAR REGION: Chronic | Status: ACTIVE | Noted: 2022-08-25

## 2022-09-18 LAB
ALBUMIN SERPL ELPH-MCNC: 3.4 G/DL — SIGNIFICANT CHANGE UP (ref 3.3–5)
ALP SERPL-CCNC: 65 U/L — SIGNIFICANT CHANGE UP (ref 30–120)
ALT FLD-CCNC: 41 U/L DA — SIGNIFICANT CHANGE UP (ref 10–60)
ANION GAP SERPL CALC-SCNC: 7 MMOL/L — SIGNIFICANT CHANGE UP (ref 5–17)
APPEARANCE UR: CLEAR — SIGNIFICANT CHANGE UP
APTT BLD: 28.9 SEC — SIGNIFICANT CHANGE UP (ref 27.5–35.5)
AST SERPL-CCNC: 30 U/L — SIGNIFICANT CHANGE UP (ref 10–40)
BASOPHILS # BLD AUTO: 0.04 K/UL — SIGNIFICANT CHANGE UP (ref 0–0.2)
BASOPHILS NFR BLD AUTO: 0.6 % — SIGNIFICANT CHANGE UP (ref 0–2)
BILIRUB SERPL-MCNC: 0.8 MG/DL — SIGNIFICANT CHANGE UP (ref 0.2–1.2)
BILIRUB UR-MCNC: NEGATIVE — SIGNIFICANT CHANGE UP
BUN SERPL-MCNC: 16 MG/DL — SIGNIFICANT CHANGE UP (ref 7–23)
CALCIUM SERPL-MCNC: 8.9 MG/DL — SIGNIFICANT CHANGE UP (ref 8.4–10.5)
CHLORIDE SERPL-SCNC: 102 MMOL/L — SIGNIFICANT CHANGE UP (ref 96–108)
CO2 SERPL-SCNC: 27 MMOL/L — SIGNIFICANT CHANGE UP (ref 22–31)
COLOR SPEC: YELLOW — SIGNIFICANT CHANGE UP
CREAT SERPL-MCNC: 0.68 MG/DL — SIGNIFICANT CHANGE UP (ref 0.5–1.3)
DIFF PNL FLD: NEGATIVE — SIGNIFICANT CHANGE UP
EGFR: 111 ML/MIN/1.73M2 — SIGNIFICANT CHANGE UP
EOSINOPHIL # BLD AUTO: 0.05 K/UL — SIGNIFICANT CHANGE UP (ref 0–0.5)
EOSINOPHIL NFR BLD AUTO: 0.7 % — SIGNIFICANT CHANGE UP (ref 0–6)
GLUCOSE SERPL-MCNC: 101 MG/DL — HIGH (ref 70–99)
GLUCOSE UR QL: NEGATIVE MG/DL — SIGNIFICANT CHANGE UP
HCT VFR BLD CALC: 38.2 % — LOW (ref 39–50)
HGB BLD-MCNC: 12.7 G/DL — LOW (ref 13–17)
IMM GRANULOCYTES NFR BLD AUTO: 0.3 % — SIGNIFICANT CHANGE UP (ref 0–0.9)
INR BLD: 0.89 RATIO — SIGNIFICANT CHANGE UP (ref 0.88–1.16)
KETONES UR-MCNC: NEGATIVE — SIGNIFICANT CHANGE UP
LEUKOCYTE ESTERASE UR-ACNC: NEGATIVE — SIGNIFICANT CHANGE UP
LIDOCAIN IGE QN: 80 U/L — SIGNIFICANT CHANGE UP (ref 73–393)
LYMPHOCYTES # BLD AUTO: 0.8 K/UL — LOW (ref 1–3.3)
LYMPHOCYTES # BLD AUTO: 11.8 % — LOW (ref 13–44)
MCHC RBC-ENTMCNC: 32.3 PG — SIGNIFICANT CHANGE UP (ref 27–34)
MCHC RBC-ENTMCNC: 33.2 GM/DL — SIGNIFICANT CHANGE UP (ref 32–36)
MCV RBC AUTO: 97.2 FL — SIGNIFICANT CHANGE UP (ref 80–100)
MONOCYTES # BLD AUTO: 0.83 K/UL — SIGNIFICANT CHANGE UP (ref 0–0.9)
MONOCYTES NFR BLD AUTO: 12.2 % — SIGNIFICANT CHANGE UP (ref 2–14)
NEUTROPHILS # BLD AUTO: 5.05 K/UL — SIGNIFICANT CHANGE UP (ref 1.8–7.4)
NEUTROPHILS NFR BLD AUTO: 74.4 % — SIGNIFICANT CHANGE UP (ref 43–77)
NITRITE UR-MCNC: NEGATIVE — SIGNIFICANT CHANGE UP
NRBC # BLD: 0 /100 WBCS — SIGNIFICANT CHANGE UP (ref 0–0)
PH UR: 7 — SIGNIFICANT CHANGE UP (ref 5–8)
PLATELET # BLD AUTO: 266 K/UL — SIGNIFICANT CHANGE UP (ref 150–400)
POTASSIUM SERPL-MCNC: 4.1 MMOL/L — SIGNIFICANT CHANGE UP (ref 3.5–5.3)
POTASSIUM SERPL-SCNC: 4.1 MMOL/L — SIGNIFICANT CHANGE UP (ref 3.5–5.3)
PROT SERPL-MCNC: 6.5 G/DL — SIGNIFICANT CHANGE UP (ref 6–8.3)
PROT UR-MCNC: NEGATIVE MG/DL — SIGNIFICANT CHANGE UP
PROTHROM AB SERPL-ACNC: 10.5 SEC — SIGNIFICANT CHANGE UP (ref 10.5–13.4)
RBC # BLD: 3.93 M/UL — LOW (ref 4.2–5.8)
RBC # FLD: 11.9 % — SIGNIFICANT CHANGE UP (ref 10.3–14.5)
SARS-COV-2 RNA SPEC QL NAA+PROBE: SIGNIFICANT CHANGE UP
SODIUM SERPL-SCNC: 136 MMOL/L — SIGNIFICANT CHANGE UP (ref 135–145)
SP GR SPEC: 1 — LOW (ref 1.01–1.02)
UROBILINOGEN FLD QL: NEGATIVE MG/DL — SIGNIFICANT CHANGE UP
WBC # BLD: 6.79 K/UL — SIGNIFICANT CHANGE UP (ref 3.8–10.5)
WBC # FLD AUTO: 6.79 K/UL — SIGNIFICANT CHANGE UP (ref 3.8–10.5)

## 2022-09-18 PROCEDURE — 96374 THER/PROPH/DIAG INJ IV PUSH: CPT | Mod: XU

## 2022-09-18 PROCEDURE — 99285 EMERGENCY DEPT VISIT HI MDM: CPT

## 2022-09-18 PROCEDURE — 87635 SARS-COV-2 COVID-19 AMP PRB: CPT

## 2022-09-18 PROCEDURE — 85610 PROTHROMBIN TIME: CPT

## 2022-09-18 PROCEDURE — 85730 THROMBOPLASTIN TIME PARTIAL: CPT

## 2022-09-18 PROCEDURE — 36415 COLL VENOUS BLD VENIPUNCTURE: CPT

## 2022-09-18 PROCEDURE — 83690 ASSAY OF LIPASE: CPT

## 2022-09-18 PROCEDURE — 85025 COMPLETE CBC W/AUTO DIFF WBC: CPT

## 2022-09-18 PROCEDURE — 80053 COMPREHEN METABOLIC PANEL: CPT

## 2022-09-18 PROCEDURE — 96375 TX/PRO/DX INJ NEW DRUG ADDON: CPT

## 2022-09-18 PROCEDURE — 74177 CT ABD & PELVIS W/CONTRAST: CPT | Mod: 26,MA

## 2022-09-18 PROCEDURE — 96361 HYDRATE IV INFUSION ADD-ON: CPT

## 2022-09-18 PROCEDURE — 99284 EMERGENCY DEPT VISIT MOD MDM: CPT | Mod: 25

## 2022-09-18 PROCEDURE — 74177 CT ABD & PELVIS W/CONTRAST: CPT | Mod: MA

## 2022-09-18 PROCEDURE — 81003 URINALYSIS AUTO W/O SCOPE: CPT

## 2022-09-18 RX ORDER — ASPIRIN/CALCIUM CARB/MAGNESIUM 324 MG
1 TABLET ORAL
Qty: 0 | Refills: 0 | DISCHARGE

## 2022-09-18 RX ORDER — ONDANSETRON 8 MG/1
4 TABLET, FILM COATED ORAL ONCE
Refills: 0 | Status: COMPLETED | OUTPATIENT
Start: 2022-09-18 | End: 2022-09-18

## 2022-09-18 RX ORDER — SODIUM CHLORIDE 9 MG/ML
1000 INJECTION INTRAMUSCULAR; INTRAVENOUS; SUBCUTANEOUS ONCE
Refills: 0 | Status: COMPLETED | OUTPATIENT
Start: 2022-09-18 | End: 2022-09-18

## 2022-09-18 RX ORDER — KETOROLAC TROMETHAMINE 30 MG/ML
30 SYRINGE (ML) INJECTION ONCE
Refills: 0 | Status: DISCONTINUED | OUTPATIENT
Start: 2022-09-18 | End: 2022-09-18

## 2022-09-18 RX ORDER — MILK THISTLE 150 MG
1 CAPSULE ORAL
Qty: 0 | Refills: 0 | DISCHARGE

## 2022-09-18 RX ORDER — OMEGA-3 ACID ETHYL ESTERS 1 G
1 CAPSULE ORAL
Qty: 0 | Refills: 0 | DISCHARGE

## 2022-09-18 RX ORDER — UBIDECARENONE 100 MG
1 CAPSULE ORAL
Qty: 0 | Refills: 0 | DISCHARGE

## 2022-09-18 RX ORDER — HYDROMORPHONE HYDROCHLORIDE 2 MG/ML
0.5 INJECTION INTRAMUSCULAR; INTRAVENOUS; SUBCUTANEOUS ONCE
Refills: 0 | Status: DISCONTINUED | OUTPATIENT
Start: 2022-09-18 | End: 2022-09-18

## 2022-09-18 RX ADMIN — HYDROMORPHONE HYDROCHLORIDE 0.5 MILLIGRAM(S): 2 INJECTION INTRAMUSCULAR; INTRAVENOUS; SUBCUTANEOUS at 12:25

## 2022-09-18 RX ADMIN — ONDANSETRON 4 MILLIGRAM(S): 8 TABLET, FILM COATED ORAL at 12:04

## 2022-09-18 RX ADMIN — SODIUM CHLORIDE 1000 MILLILITER(S): 9 INJECTION INTRAMUSCULAR; INTRAVENOUS; SUBCUTANEOUS at 12:04

## 2022-09-18 RX ADMIN — HYDROMORPHONE HYDROCHLORIDE 0.5 MILLIGRAM(S): 2 INJECTION INTRAMUSCULAR; INTRAVENOUS; SUBCUTANEOUS at 12:05

## 2022-09-18 RX ADMIN — Medication 30 MILLIGRAM(S): at 12:25

## 2022-09-18 RX ADMIN — Medication 30 MILLIGRAM(S): at 12:05

## 2022-09-18 RX ADMIN — SODIUM CHLORIDE 1000 MILLILITER(S): 9 INJECTION INTRAMUSCULAR; INTRAVENOUS; SUBCUTANEOUS at 12:49

## 2022-09-18 NOTE — ED PROVIDER NOTE - CLINICAL SUMMARY MEDICAL DECISION MAKING FREE TEXT BOX
Postop umbilical hernia repair with worsening pain bruising and distention.  Plan is labs CT abdomen and surgical evaluation.

## 2022-09-18 NOTE — ED PROVIDER NOTE - OBJECTIVE STATEMENT
52-year-old male with history of chronic pain, had umbilical hernia surgery 10 days ago with Dr. Interiano, states he developed increasing abdominal pain and bleeding and bruising around umbilical site several days ago.  He spoke to Dr. Interiano on the phone last week who told him not to worry about it.  Past 2 days he developed more intense abdominal pain with nausea and feels like abdomen is more distended.  Patient denies fever chest pain shortness of breath vomiting diarrhea dysuria hematuria constipation or other symptom.  Takes daily oxycodone for his chronic back pain.

## 2022-09-18 NOTE — ED PROVIDER NOTE - CARE PROVIDER_API CALL
Davi Interiano (MD)  Surgery; Surgical Critical Care  [facility][note 3][address] [facility], [address]  [place] [address]  Phone: [phone]  Fax: [phone]  Established Patient  Follow Up Time: 1-3 Days

## 2022-09-18 NOTE — ED PROVIDER NOTE - GASTROINTESTINAL, MLM
Distended abdomen diminished bowel sounds.  Steri-Strips covering umbilicus with small amount of dried blood on bandage.  Tenderness around umbilicus and diffusely with bruising noted inferior to umbilicus.  No rebound.  No CVA tenderness.

## 2022-09-18 NOTE — ED ADULT NURSE NOTE - OBJECTIVE STATEMENT
52 YOM A&OX3 with pmh of chronic back pain (lumbar herniated discs), kidney stones and psh of umbilical hernia repair presents for abd pain s/p post-op. pt states 9/8/22 had umbilical hernia repair in [facility] and developed worsening abd pain, radiating to groin and increased bruising of abdomen in last few days. pt noted to have distended abdomen bruising in lower abd area and tenderness to palpation. pt rates pain 10/10 and also complains of nausea. pt denies chest pain, sob, vomiting/diarrhea, urinary or bowel issues, fevers/chills. safety maintained.

## 2022-09-18 NOTE — ED PROVIDER NOTE - PATIENT PORTAL LINK FT
You can access the FollowMyHealth Patient Portal offered by [facility] by registering at the following website: [email or web address]. By joining [employer]’s FollowMyHealth portal, you will also be able to view your health information using other applications (apps) compatible with our system.

## 2022-10-31 ENCOUNTER — INPATIENT (INPATIENT)
Facility: HOSPITAL | Age: 52
LOS: 16 days | Discharge: NURSING, REHAB OR LONG-TERM CARE | DRG: 949 | End: 2022-11-17
Attending: PHYSICAL MEDICINE & REHABILITATION | Admitting: SPECIALIST
Payer: MEDICAID

## 2022-10-31 VITALS
TEMPERATURE: 97 F | DIASTOLIC BLOOD PRESSURE: 89 MMHG | HEART RATE: 84 BPM | SYSTOLIC BLOOD PRESSURE: 144 MMHG | OXYGEN SATURATION: 96 % | RESPIRATION RATE: 17 BRPM | WEIGHT: 194.01 LBS | HEIGHT: 72 IN

## 2022-10-31 DIAGNOSIS — Z98.890 OTHER SPECIFIED POSTPROCEDURAL STATES: Chronic | ICD-10-CM

## 2022-10-31 DIAGNOSIS — Z51.89 ENCOUNTER FOR OTHER SPECIFIED AFTERCARE: ICD-10-CM

## 2022-10-31 DIAGNOSIS — E43 UNSPECIFIED SEVERE PROTEIN-CALORIE MALNUTRITION: ICD-10-CM

## 2022-10-31 DIAGNOSIS — M25.511 PAIN IN RIGHT SHOULDER: ICD-10-CM

## 2022-10-31 DIAGNOSIS — Z47.89 ENCOUNTER FOR OTHER ORTHOPEDIC AFTERCARE: ICD-10-CM

## 2022-10-31 DIAGNOSIS — G89.29 OTHER CHRONIC PAIN: ICD-10-CM

## 2022-10-31 DIAGNOSIS — R78.81 BACTEREMIA: ICD-10-CM

## 2022-10-31 DIAGNOSIS — G06.1 INTRASPINAL ABSCESS AND GRANULOMA: ICD-10-CM
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
## 2022-10-31 DIAGNOSIS — F41.9 ANXIETY DISORDER, UNSPECIFIED: ICD-10-CM

## 2022-10-31 DIAGNOSIS — S43.491D OTHER SPRAIN OF RIGHT SHOULDER JOINT, SUBSEQUENT ENCOUNTER: ICD-10-CM

## 2022-10-31 DIAGNOSIS — K68.12 PSOAS MUSCLE ABSCESS: ICD-10-CM

## 2022-10-31 DIAGNOSIS — R25.2 CRAMP AND SPASM: ICD-10-CM

## 2022-10-31 DIAGNOSIS — R53.1 WEAKNESS: ICD-10-CM

## 2022-10-31 DIAGNOSIS — M54.50 LOW BACK PAIN, UNSPECIFIED: ICD-10-CM

## 2022-10-31 DIAGNOSIS — B95.61 METHICILLIN SUSCEPTIBLE STAPHYLOCOCCUS AUREUS INFECTION AS THE CAUSE OF DISEASES CLASSIFIED ELSEWHERE: ICD-10-CM

## 2022-10-31 DIAGNOSIS — R06.2 WHEEZING: ICD-10-CM

## 2022-10-31 DIAGNOSIS — Z98.1 ARTHRODESIS STATUS: ICD-10-CM

## 2022-10-31 DIAGNOSIS — Z63.8 OTHER SPECIFIED PROBLEMS RELATED TO PRIMARY SUPPORT GROUP: ICD-10-CM

## 2022-10-31 DIAGNOSIS — K59.00 CONSTIPATION, UNSPECIFIED: ICD-10-CM

## 2022-10-31 DIAGNOSIS — R26.9 UNSPECIFIED ABNORMALITIES OF GAIT AND MOBILITY: ICD-10-CM

## 2022-10-31 DIAGNOSIS — I10 ESSENTIAL (PRIMARY) HYPERTENSION: ICD-10-CM

## 2022-10-31 LAB — SARS-COV-2 RNA SPEC QL NAA+PROBE: SIGNIFICANT CHANGE UP

## 2022-10-31 PROCEDURE — 71045 X-RAY EXAM CHEST 1 VIEW: CPT | Mod: 26

## 2022-10-31 RX ORDER — NAFCILLIN 10 G/100ML
2 INJECTION, POWDER, FOR SOLUTION INTRAVENOUS EVERY 4 HOURS
Refills: 0 | Status: DISCONTINUED | OUTPATIENT
Start: 2022-10-31 | End: 2022-11-17

## 2022-10-31 RX ORDER — NAFCILLIN 10 G/100ML
2 INJECTION, POWDER, FOR SOLUTION INTRAVENOUS EVERY 4 HOURS
Refills: 0 | Status: DISCONTINUED | OUTPATIENT
Start: 2022-10-31 | End: 2022-10-31

## 2022-10-31 RX ORDER — OXYCODONE HYDROCHLORIDE 5 MG/1
20 TABLET ORAL EVERY 12 HOURS
Refills: 0 | Status: DISCONTINUED | OUTPATIENT
Start: 2022-10-31 | End: 2022-11-01

## 2022-10-31 RX ORDER — BACLOFEN 100 %
5 POWDER (GRAM) MISCELLANEOUS EVERY 6 HOURS
Refills: 0 | Status: DISCONTINUED | OUTPATIENT
Start: 2022-10-31 | End: 2022-11-01

## 2022-10-31 RX ORDER — CHLORHEXIDINE GLUCONATE 213 G/1000ML
1 SOLUTION TOPICAL DAILY
Refills: 0 | Status: DISCONTINUED | OUTPATIENT
Start: 2022-10-31 | End: 2022-11-17

## 2022-10-31 RX ORDER — POLYETHYLENE GLYCOL 3350 17 G/17G
17 POWDER, FOR SOLUTION ORAL DAILY
Refills: 0 | Status: DISCONTINUED | OUTPATIENT
Start: 2022-10-31 | End: 2022-11-17

## 2022-10-31 RX ORDER — OXYCODONE HYDROCHLORIDE 5 MG/1
20 TABLET ORAL EVERY 4 HOURS
Refills: 0 | Status: DISCONTINUED | OUTPATIENT
Start: 2022-10-31 | End: 2022-11-07

## 2022-10-31 RX ORDER — GABAPENTIN 400 MG/1
300 CAPSULE ORAL THREE TIMES A DAY
Refills: 0 | Status: DISCONTINUED | OUTPATIENT
Start: 2022-10-31 | End: 2022-11-17

## 2022-10-31 RX ORDER — SIMETHICONE 80 MG/1
80 TABLET, CHEWABLE ORAL
Refills: 0 | Status: DISCONTINUED | OUTPATIENT
Start: 2022-10-31 | End: 2022-11-17

## 2022-10-31 RX ORDER — HEPARIN SODIUM 5000 [USP'U]/ML
5000 INJECTION INTRAVENOUS; SUBCUTANEOUS EVERY 8 HOURS
Refills: 0 | Status: DISCONTINUED | OUTPATIENT
Start: 2022-10-31 | End: 2022-11-07

## 2022-10-31 RX ORDER — MECLIZINE HCL 12.5 MG
25 TABLET ORAL THREE TIMES A DAY
Refills: 0 | Status: DISCONTINUED | OUTPATIENT
Start: 2022-10-31 | End: 2022-11-02

## 2022-10-31 RX ORDER — LISINOPRIL 2.5 MG/1
40 TABLET ORAL DAILY
Refills: 0 | Status: DISCONTINUED | OUTPATIENT
Start: 2022-11-01 | End: 2022-11-17

## 2022-10-31 RX ORDER — ACETAMINOPHEN 500 MG
650 TABLET ORAL EVERY 6 HOURS
Refills: 0 | Status: DISCONTINUED | OUTPATIENT
Start: 2022-10-31 | End: 2022-11-17

## 2022-10-31 RX ORDER — FAMOTIDINE 10 MG/ML
20 INJECTION INTRAVENOUS DAILY
Refills: 0 | Status: DISCONTINUED | OUTPATIENT
Start: 2022-10-31 | End: 2022-11-17

## 2022-10-31 RX ORDER — SENNA PLUS 8.6 MG/1
2 TABLET ORAL AT BEDTIME
Refills: 0 | Status: DISCONTINUED | OUTPATIENT
Start: 2022-10-31 | End: 2022-11-17

## 2022-10-31 RX ADMIN — OXYCODONE HYDROCHLORIDE 20 MILLIGRAM(S): 5 TABLET ORAL at 23:50

## 2022-10-31 RX ADMIN — OXYCODONE HYDROCHLORIDE 20 MILLIGRAM(S): 5 TABLET ORAL at 23:10

## 2022-10-31 RX ADMIN — NAFCILLIN 200 GRAM(S): 10 INJECTION, POWDER, FOR SOLUTION INTRAVENOUS at 22:52

## 2022-10-31 RX ADMIN — Medication 5 MILLIGRAM(S): at 23:09

## 2022-10-31 RX ADMIN — HEPARIN SODIUM 5000 UNIT(S): 5000 INJECTION INTRAVENOUS; SUBCUTANEOUS at 22:52

## 2022-10-31 RX ADMIN — OXYCODONE HYDROCHLORIDE 20 MILLIGRAM(S): 5 TABLET ORAL at 21:02

## 2022-10-31 RX ADMIN — GABAPENTIN 300 MILLIGRAM(S): 400 CAPSULE ORAL at 22:52

## 2022-10-31 RX ADMIN — OXYCODONE HYDROCHLORIDE 20 MILLIGRAM(S): 5 TABLET ORAL at 21:44

## 2022-10-31 SDOH — SOCIAL STABILITY - SOCIAL INSECURITY: OTHER SPECIFIED PROBLEMS RELATED TO PRIMARY SUPPORT GROUP: Z63.8

## 2022-10-31 NOTE — H&P ADULT - ATTENDING COMMENTS
52 year old male with history as stated above chronic LBP admitted to rehab after recent hospitalization to [facility] with increasing pain and new right shoulder pain . In hospital with sepsis   Underwent L2-S1 Lami and washout.  Blood cultures MSSA , KD negative, spinal wound - staph, arthrocentesis - MSSA On IV abx .   Patient seen at bedside   aaox3, appears anxious, reports spasms and also with anticipatory pain with any movement  Vital Signs Last 24 Hrs  T(C): 36.8 (01 Nov 2022 08:43), Max: 36.8 (01 Nov 2022 08:43)  T(F): 98.3 (01 Nov 2022 08:43), Max: 98.3 (01 Nov 2022 08:43)  HR: 65 (01 Nov 2022 12:31) (65 - 100)  BP: 152/97 (01 Nov 2022 12:31) (125/91 - 152/97)  BP(mean): --  RR: 16 (01 Nov 2022 08:43) (16 - 17)  SpO2: 95% (01 Nov 2022 08:43) (95% - 96%)    Parameters below as of 31 Oct 2022 20:10  Patient On (Oxygen Delivery Method): room air    Pulm: breathing comfortably  Heart: well perfused  Abd- distended soft  Ext- no edema, LUE PICC   Motor - right shoulder could not be examined as patient with pain otherwise 5/5, sensory intact to light touch.   Back - incision with staples - no swelling or drainage                          11.5   7.47  )-----------( 440      ( 01 Nov 2022 06:25 )             34.2     11-01    142  |  108  |  12  ----------------------------<  107<H>  3.8   |  24  |  0.73    Ca    9.2      01 Nov 2022 06:25    TPro  7.1  /  Alb  2.4<L>  /  TBili  0.9  /  DBili  x   /  AST  27  /  ALT  51<H>  /  AlkPhos  92  11-01    Begin full rehab program   Nafcillin every 4 hrs to complete 6 week course, weekly labs - CBC, CMP, ESR , CRP   Neuropsychology consult for anxiety, coping for current medical events  Pain management : on oxycontin bid, oxycodone prn, baclofen 5mg q6h for spasms- will change to 10 mg q8h   Bladder program: toileting prn   Bowel program : monitor opoid induced constipation.

## 2022-10-31 NOTE — H&P ADULT - HISTORY OF PRESENT ILLNESS
This is a 51 YO male with PMH of lumbar Spinal Stenosis, Kidney Stone, and chronic back pain due to sports injuries who presented to [facility] on 10/14 with c/o Right Sided Shoulder Pain and acute on chronic Lower back pain. Pt seen by neurosurgery no acute intervention outpt FU. Shoulder XR negative. Shoulder Mri with Mildly displaced anterior glenoid labrum extending from the three to 4:00 position with evidence of an ALPSA lesion. Soft tissue swelling and edema seen in the region of the distal clavicle involving the trapezius musculature. Per Ortho, no acute orthopedic surgical intervention warranted. S/p Right shoulder subacromial injection 10/17/2022.      Hospital course significant for RRT on 10/18 due to rigors and severe pain, Temp 102.8.RRT on 10/20 for CP and SOB. L2S1 laminectomy and washout on 10/22. Seen by ID, nafcillin 2 q 4hrs till 12/3 with weekly sed rate, crp, cmp and cbc. PICC line inserted on 10/30. Normal 5/5 strength is all tested muscle groups.   This is a 51 YO male with PMH of lumbar Spinal Stenosis, Kidney Stone, and chronic back pain due to sports injuries who presented to [facility] on 10/14 with c/o Right Sided Shoulder Pain and acute on chronic Lower back pain. Pt seen by neurosurgery no acute intervention outpt FU. Shoulder XR negative. Shoulder Mri with Mildly displaced anterior glenoid labrum extending from the three to 4:00 position with evidence of an ALPSA lesion. Soft tissue swelling and edema seen in the region of the distal clavicle involving the trapezius musculature. Per Ortho, no acute orthopedic surgical intervention warranted. S/p Right shoulder subacromial injection 10/17/2022. Hospital course significant for RRT on 10/18 due to rigors and severe pain, Temp 102.8.RRT on 10/20 for CP and SOB. L2S1 laminectomy and washout on 10/22. Seen by ID, nafcillin 2 q 4hrs till 12/3 with weekly sed rate, crp, cmp and cbc. PICC line inserted on 10/30. Normal 5/5 strength is all tested muscle groups.   This is a 51 YO male with PMH of lumbar Spinal Stenosis, Kidney Stone, and chronic back pain due to sports injuries who presented to [facility] on 10/14 with c/o Right Sided Shoulder Pain and acute on chronic Lower back pain. Pt seen by neurosurgery no acute intervention outpt FU. Shoulder XR negative. Shoulder Mri with Mildly displaced anterior glenoid labrum extending from the three to 4:00 position with evidence of an ALPSA lesion. Soft tissue swelling and edema seen in the region of the distal clavicle involving the trapezius musculature. Per Ortho, no acute orthopedic surgical intervention warranted. S/p Right shoulder subacromial injection 10/17/2022. Hospital course significant for RRT on 10/18 due to rigors and severe pain, Temp 102.8.RRT on 10/20 for CP and SOB.   L2- S1 laminectomy and washout on 10/22. Seen by ID, nafcillin 2 q 4hrs till 12/3 with weekly sed rate, crp, cmp and cbc. PICC line inserted on 10/30. Normal 5/5 strength is all tested muscle groups.   This is a 51 YO male with PMH of lumbar Spinal Stenosis, Kidney Stone, and chronic back pain due to sports injuries who presented to [facility] on 10/14 with c/o Right Sided Shoulder Pain and acute on chronic Lower back pain. Pt seen by neurosurgery no acute intervention outpt FU. Shoulder XR negative. Shoulder Mri with Mildly displaced anterior glenoid labrum extending from the three to 4:00 position with evidence of an ALPSA lesion. Soft tissue swelling and edema seen in the region of the distal clavicle involving the trapezius musculature. Per Ortho, no acute orthopedic surgical intervention warranted. S/p Right shoulder subacromial injection 10/17/2022. Hospital course significant for RRT on 10/18 due to rigors and severe pain, Temp 102.8.RRT on 10/20 for CP and SOB. Bacteremia Blood cx 's + GPC MSSA   L2- S1 laminectomy and evacuation of epidural abscess and washout on 10/22. Seen by ID, nafcillin 2 q 4hrs till 12/3 with weekly sed rate, crp, cmp and cbc. PICC line inserted on 10/30  KD negative for vegetation

## 2022-10-31 NOTE — PATIENT PROFILE ADULT - FALL HARM RISK - HARM RISK INTERVENTIONS

## 2022-10-31 NOTE — PATIENT PROFILE ADULT - DOES PATIENT HAVE ADVANCE DIRECTIVE
SEVERITY:- BORDERLINE ECG -

SINUS RHYTHM

RIGHT AXIS DEVIATION

:

Confirmed by: Pranav Lomeli MD 31-Mar-2020 10:29:39 No

## 2022-10-31 NOTE — H&P ADULT - NSHPREVIEWOFSYSTEMS_GEN_ALL_CORE
REVIEW OF SYSTEMS  Constitutional: No fever, No Chills, No fatigue  HEENT: No eye pain, No visual disturbances, No difficulty hearing  Pulm: No cough,  No shortness of breath  Cardio: No chest pain, No palpitations  GI:  No abdominal pain, No nausea, No vomiting, No diarrhea, No constipation  : No dysuria, No frequency, No hematuria  Neuro: No headaches, No memory loss, + loss of strength, no numbness, No tremors  Skin: No itching, No rashes, No lesions   Endo: No temperature intolerance  MSK: + joint pain- right shoulder, No joint swelling, No muscle pain, No Neck pain,     back pain  Psych:  No depression, No anxiety

## 2022-10-31 NOTE — H&P ADULT - NSHPLABSRESULTS_GEN_ALL_CORE
RECENT LABS/IMAGING  10/31/2022    Glucose: 106    Sodium: 139    Potassium: 4.1    Blood Urea Nitrogen : 11    Creatinine: 0.63   Hematology:    10/31/2022    WBC: 8.13    HgB: 11.7    Hct: 35    Platelets: 450    Cultures:    10/17,10/18,10/19 blood cx mssa  10/20 arthocentesis-- mssa   10/22 blood cx neg to date   10/22 wound spinal - staph   KD neg for vegetation  10/26 blood cx neg 72 hours     10/21/2022 KD   1. Left ventricle: The cavity size is normal. There are no regional wall motion abnormalities present. The LVEF was calculated by 3D reconstruction. Left ventricular ejection fraction is 61 %.  2. Right ventricle: The cavity size is normal. Right ventricular systolic function is normal.  3. Left atrium: There is no thrombus in the left atrium or the left atrial appendage. There is color Doppler flow across the interatrial septum consistent with a patent foramen ovale.  4. Mitral valve: The leaflets are moderately thickened. There is no evidence of mitral valve stenosis. There is trace mitral valve regurgitation.  5. Aortic valve: The valve is trileaflet. The leaflets are mildly thickened. There is no aortic stenosis. There is mild ( 1 +) valvular aortic regurgitation.  6. Tricuspid valve: The tricuspid leaflets are not thickened. There is no tricuspid stenosis. There is trace tricuspid valve regurgitation.   7. Pericardium, extracardiac: There is no pericardial effusion.    CXR 10/30: Well-positioned left upper extremity PICC line. Cardiomegaly. Left basilar subsegmental atelectasis versus infiltrates.    10/14:  MRI cervical, thoracic and lumbar, revealed degenerative changes; T7-T8 disc osteophyte complex formation; no impingement or canal stenosis observed; multi level   severe degenerative changes of the lumbar spine w/ impingement against the cauda equina at multiple levels, canal stenosis severe at L3-L4 and mod/sev at L2-L3 w/ serpentine distortion of the cauda equina; severe R-L4-L5 foraminal stenosis.    MRI R-shoulder revealed mild displaced glenoid labrun w/ evidence of ALPSA lesion; soft tissue swelling and edema; osteoarthritis of acromioclavicular joint.      X-ray abdomen 10/20: There are gas-filled dilated loops of small bowel nonspecific in nature. These findings can be consistent with an obstructive etiology as well as an ileus. There is gas noted within the colon. There is no free air. Noted are scoliotic and degenerative changes of the spine.    CT A/P 10/29: *  Small bilateral pleural effusion with associated airspace opacities. *  Postsurgical changes within the posterior lumbar spine with multilevel laminectomy and soft tissue swelling. Subtle stranding and fluid is noted within the posterior soft tissue. *  Moderate stool within the proximal, transverse and the distal sigmoid/rectum. There is subtle stranding noted along the sigmoid colon and rectum. Findings could reflect a mild colitis/proctitis. *  Nonspecific right perinephric stranding. Recommend correlation with urinalysis. *  Nonspecific thickening of the stomach and the proximal small bowel loops this may represent gastroenteritis.

## 2022-10-31 NOTE — H&P ADULT - NSHPPHYSICALEXAM_GEN_ALL_CORE
PHYSICAL EXAM  VITALS  T(C): 36.3 (10-31-22 @ 20:10), Max: 36.3 (10-31-22 @ 20:10)  HR: 84 (10-31-22 @ 20:10) (84 - 84)  BP: 144/89 (10-31-22 @ 20:10) (144/89 - 144/89)  RR: 17 (10-31-22 @ 20:10) (17 - 17)  SpO2: 96% (10-31-22 @ 20:10) (96% - 96%)    Gen - NAD, Comfortable  HEENT - NCAT, EOMI, MMM  Neck - Supple, No limited ROM  Pulm - CTAB, No wheeze, No rhonchi, No crackles  Cardiovascular - RRR, S1S2, No murmurs  Chest - good chest expansion, good respiratory effort  Abdomen - Soft, NT/ND, +BS  Extremities - No Cyanosis, no clubbing, no edema, no calf tenderness  Neuro-     Cognitive - awake, alert, oriented to person, place, date, year, and situation.  Able to follow command     Communication - Fluent, Comprehensible     Attention: Intact, able to state days of week chronologically and backwards. Able to perform simple additions and subtractions     Judgement: Good evidence of judgement     Memory: Recall 3 objects immediate and 3 min later     Cranial Nerves - CN 2-12 intact. No facial asymmetry, Tongue midline, EOMI, Shoulder shrug intact     Motor - limited 2/2 pain                    LEFT    UE - ShAB 5/5, EF 5/5, EE 5/5,  5/5                    RIGHT UE - ShAB 3/5, EF 3/5, EE 3/5,   5/5                    LEFT    LE - HF 3/5, KE 3/5, DF 5/5, PF 5/5                    RIGHT LE - HF 3-/5, KE 3-/5, DF 5/5, PF 5/5        Sensory - Intact to LT      Reflexes - DTR Intact, No primitive reflexive     Coordination - FTN intact      Tone - normal  Psychiatric - Mood stable, Affect WNL  Skin:  lumbar spine incision with staples KATARZYNA

## 2022-10-31 NOTE — H&P ADULT - NSHPSOCIALHISTORY_GEN_ALL_CORE
Smoking -  EtOH -   Drugs -     Marital status: Single    Patient lives alone in a 2 level house Bed/bath upstairs (flight inside with rail).  Primary Language:  English   PTA: Independent in ADLs and ambulation     CURRENT FUNCTIONAL STATUS  Date: 10/29  Supine to sit:  Min A  Bed to chair:  CGA  sit to stand:  Min A  Stand to sit:  CGA    Ambulation:5 steps to the chair. Pt reported to have spasm in the back, RW, CGA, Brace, Unsteady, Slow pace and guarding the back    10/31/2022 OT note  Rolling:  Min A  Supine to sit:  Mod A  Bed to chair:  Min A  Turn;Dangle;Stand at bedside;   Functional mobility; Ambulate in room (5 ft bed to chair then 13 ft chair towards bathroom.   Pt asked to sit down 2/2 increased muscle spasms. Mod A patient does 50-74% Front wheeled walker  Sit to stand:  Min A; Mod A  Armchair transfer:  Min A; Mod A verbal cues for safety  Eating supine edge of bed:  Supervision, set up beverage management  UE dressing:  Min A; Mod A  LE dressing:  Max A  Toileting:  Supervision  Setup; increased time to complete; Use of bedpan/urinal setup Smoking - Denies  EtOH - current daily drinker  Drugs - Denies    Marital status: Single    Patient lives alone in a 2 level house Bed/bath upstairs (flight inside with rail).  Primary Language:  English   PTA: Independent in ADLs and ambulation     CURRENT FUNCTIONAL STATUS  Date: 10/29  Supine to sit:  Min A  Bed to chair:  CGA  sit to stand:  Min A  Stand to sit:  CGA    Ambulation:5 steps to the chair. Pt reported to have spasm in the back, RW, CGA, Brace, Unsteady, Slow pace and guarding the back    10/31/2022 OT note  Rolling:  Min A  Supine to sit:  Mod A  Bed to chair:  Min A  Turn;Dangle;Stand at bedside;   Functional mobility; Ambulate in room (5 ft bed to chair then 13 ft chair towards bathroom.   Pt asked to sit down 2/2 increased muscle spasms. Mod A patient does 50-74% Front wheeled walker  Sit to stand:  Min A; Mod A  Armchair transfer:  Min A; Mod A verbal cues for safety  Eating supine edge of bed:  Supervision, set up beverage management  UE dressing:  Min A; Mod A  LE dressing:  Max A  Toileting:  Supervision  Setup; increased time to complete; Use of bedpan/urinal setup

## 2022-10-31 NOTE — H&P ADULT - ASSESSMENT
ASSESSMENT/PLAN  This is a 51 YO male with PMH of lumbar Spinal Stenosis, Kidney Stone, and chronic back pain due to sports injuries who presented to [facility] on 10/14 with c/o Right Sided Shoulder Pain and acute on chronic Lower back pain. Shoulder MRI with Mildly displaced anterior glenoid labrum extending from the three to 4:00 position with evidence of an ALPSA lesion. Soft tissue swelling and edema seen in the region of the distal clavicle involving the trapezius musculature. s/p Right shoulder subacromial injection 10/17/2022. Hospital course significant for sepsis, no on long term IV ABX. Patient now with gait Instability, ADL impairments and Functional impairments.    #OA  - s/p Right shoulder subacromial injection 10/17/2022  - L2S1 laminectomy and washout on 10/22/22  - Start Comprehensive Rehab Program: PT/OT, 3hours daily and 5 days weekly  - PT: Focused on improving strength, endurance, coordination, balance, functional mobility, and transfers  - OT: Focused on improving strength, fine motor skills, coordination, posture and ADLs.      #Sepsis  - Naficillin 2G Q 4hrs till 12/3  - PICC line inserted on 10/30  - Weekly ESR, CRP, CMP, and CBC     #HTN  - lisinopril 40mg daily    #Pain management  - Tylenol PRN, Oxycodone, Neurontin    #DVT ppx  - Heparin, SCD, TEDs    #GI ppx  - Pepcid 20mg     #Bowel Regimen  - Senna, miralax     #Bladder management  - BS on admission, and q 8 hours (SC if > 400)  - Monitor UO    #FEN   - Diet: Regular    #Skin:  - Skin on admission: ***  - Pressure injury/Skin: Turn Q2hrs while in bed, OOB to Chair, PT/OT     #Sleep:   - Maintain quiet hours and low stim environment.  - Melatonin PRN to maximize participation in therapy during the day.     #Precaution  - Fall, Aspiration, Spinal    #GOC  CODE STATUS: FULL CODE    Outpatient Follow-up (Specialty/Name of physician):      MEDICAL PROGNOSIS: GOOD            REHAB POTENTIAL: GOOD             ESTIMATED DISPOSITION: HOME WITH HOME CARE            ELOS: 10-14 Days   EXPECTED THERAPY:     P.T. 1hr/day       O.T. 1hr/day          P&O Unnecessary     EXP FREQUENCY: 5 days per 7 day period     PRESCREEN COMPARISON:   I have reviewed the prescreen information and I have found no relevant changes between the preadmission screening and my post admission evaluation     RATIONALE FOR INPATIENT ADMISSION - Patient demonstrates the following: (check all that apply)  [X] Medically appropriate for rehabilitation admission  [X] Has attainable rehab goals with an appropriate initial discharge plan  [X] Has rehabilitation potential (expected to make a significant improvement within a reasonable period of time)   [X] Requires close medical management by a rehab physician, rehab nursing care, Hospitalist and comprehensive interdisciplinary team (including PT, OT, & or SLP, Prosthetics and Orthotics)   ASSESSMENT/PLAN  This is a 51 YO male with PMH of lumbar Spinal Stenosis, Kidney Stone, and chronic back pain due to sports injuries who presented to [facility] on 10/14 with c/o Right Sided Shoulder Pain and acute on chronic Lower back pain. Shoulder MRI with Mildly displaced anterior glenoid labrum extending from the three to 4:00 position with evidence of an ALPSA lesion. Soft tissue swelling and edema seen in the region of the distal clavicle involving the trapezius musculature. s/p Right shoulder subacromial injection 10/17/2022. Hospital course significant for sepsis, no on long term IV ABX. Patient now with gait Instability, ADL impairments and Functional impairments.    # Mildly displaced anterior glenoid labrum   #Lumbar Spinal Stenosis  - s/p Right shoulder subacromial injection 10/17/2022  - s/p L2-S1 laminectomy and washout on 10/22/22  - Start Comprehensive Rehab Program: PT/OT, 3hours daily and 5 days weekly  - PT: Focused on improving strength, endurance, coordination, balance, functional mobility, and transfers  - OT: Focused on improving strength, fine motor skills, coordination, posture and ADLs.      #Sepsis  - Naficillin 2G Q 4hrs till 12/3  - Left DL PICC line inserted on 10/30  - Weekly ESR, CRP, CMP, and CBC     #HTN  - lisinopril 40mg daily    #Pain management  - Tylenol PRN, Oxycodone, Oxycontin, Neurontin, baclofen    #DVT ppx  - Heparin, SCD, TEDs    #GI ppx  - Pepcid 20mg     #Bowel Regimen  - Senna, miralax     #Bladder management  - BS on admission, and q 8 hours (SC if > 400)  - Monitor UO    #FEN   - Diet: Regular    #Skin:  - Skin on admission: Lumbar spine incision with staples KATARZYNA  - Pressure injury/Skin: Turn Q2hrs while in bed, OOB to Chair, PT/OT     #Sleep:   - Maintain quiet hours and low stim environment.  - Melatonin PRN to maximize participation in therapy during the day.     #Precaution  - Fall, Aspiration, Spinal    #GOC  CODE STATUS: FULL CODE    Outpatient Follow-up (Specialty/Name of physician):  Dr. Benito Blacno  [address] [address]    Dr. Russ Bautista  Neurosurgeon  [address] [facility] [address]  [place] [address]  [phone]    MEDICAL PROGNOSIS: GOOD            REHAB POTENTIAL: GOOD             ESTIMATED DISPOSITION: HOME WITH HOME CARE            ELOS: 10-14 Days   EXPECTED THERAPY:     P.T. 1hr/day       O.T. 1hr/day          P&O Unnecessary     EXP FREQUENCY: 5 days per 7 day period     PRESCREEN COMPARISON:   I have reviewed the prescreen information and I have found no relevant changes between the preadmission screening and my post admission evaluation     RATIONALE FOR INPATIENT ADMISSION - Patient demonstrates the following: (check all that apply)  [X] Medically appropriate for rehabilitation admission  [X] Has attainable rehab goals with an appropriate initial discharge plan  [X] Has rehabilitation potential (expected to make a significant improvement within a reasonable period of time)   [X] Requires close medical management by a rehab physician, rehab nursing care, Hospitalist and comprehensive interdisciplinary team (including PT, OT, & or SLP, Prosthetics and Orthotics)   ASSESSMENT/PLAN  This is a 51 YO male with PMH of lumbar Spinal Stenosis, Kidney Stone, and chronic back pain due to sports injuries who presented to [facility] on 10/14 with c/o Right Sided Shoulder Pain and acute on chronic Lower back pain. Shoulder MRI with Mildly displaced anterior glenoid labrum extending from the three to 4:00 position with evidence of an ALPSA lesion. Soft tissue swelling and edema seen in the region of the distal clavicle involving the trapezius musculature. s/p Right shoulder subacromial injection 10/17/2022. Hospital course significant for sepsis, no on long term IV ABX. Patient now with gait Instability, ADL impairments and Functional impairments.    # Mildly displaced anterior glenoid labrum   #Lumbar Spinal Stenosis  - s/p Right shoulder subacromial injection 10/17/2022  - s/p L2-S1 laminectomy and washout on 10/22/22  - Start Comprehensive Rehab Program: PT/OT, 3hours daily and 5 days weekly  - PT: Focused on improving strength, endurance, coordination, balance, functional mobility, and transfers  - OT: Focused on improving strength, fine motor skills, coordination, posture and ADLs.      #Sepsis  - Naficillin 2G Q 4hrs till 12/3  - Left DL PICC line inserted on 10/30  - Weekly ESR, CRP, CMP, and CBC     #HTN  - lisinopril 40mg daily    #Pain management  - Tylenol PRN, Oxycodone, Oxycontin, Neurontin, baclofen    #DVT ppx  - Heparin, SCD, TEDs    #GI ppx  - Pepcid 20mg     #Bowel Regimen  - Senna, miralax     #Bladder management  - BS on admission, and q 8 hours (SC if > 400)  - Monitor UO    #FEN   - Diet: Regular    #Skin:  - Skin on admission: Lumbar spine incision with staples KATARZYNA  - Pressure injury/Skin: Turn Q2hrs while in bed, OOB to Chair, PT/OT     #Sleep:   - Maintain quiet hours and low stim environment.  - Melatonin PRN to maximize participation in therapy during the day.     #Precaution  - Fall, Spinal    #GOC  CODE STATUS: FULL CODE    Outpatient Follow-up (Specialty/Name of physician):  Dr. Benito Blanco  [address] [facility] [address]    Dr. Russ Bautista  Neurosurgeon  [address] [facility] [address]  [place] [address]  [phone]    MEDICAL PROGNOSIS: GOOD            REHAB POTENTIAL: GOOD             ESTIMATED DISPOSITION: HOME WITH HOME CARE            ELOS: 10-14 Days   EXPECTED THERAPY:     P.T. 2hr/day       O.T. 1hr/day          P&O Unnecessary     EXP FREQUENCY: 5 days per 7 day period     PRESCREEN COMPARISON:   I have reviewed the prescreen information and I have found no relevant changes between the preadmission screening and my post admission evaluation     RATIONALE FOR INPATIENT ADMISSION - Patient demonstrates the following: (check all that apply)  [X] Medically appropriate for rehabilitation admission  [X] Has attainable rehab goals with an appropriate initial discharge plan  [X] Has rehabilitation potential (expected to make a significant improvement within a reasonable period of time)   [X] Requires close medical management by a rehab physician, rehab nursing care, Hospitalist and comprehensive interdisciplinary team (including PT, OT, & or SLP, Prosthetics and Orthotics)   ASSESSMENT/PLAN  This is a 51 YO male with PMH of lumbar Spinal Stenosis, Kidney Stone, and chronic back pain due to sports injuries who presented to [facility] on 10/14 with c/o Right Sided Shoulder Pain and acute on chronic Lower back pain. Shoulder MRI with Mildly displaced anterior glenoid labrum extending from the three to 4:00 position with evidence of an ALPSA lesion. Soft tissue swelling and edema seen in the region of the distal clavicle involving the trapezius musculature. s/p Right shoulder subacromial injection 10/17/2022. Hospital course significant for sepsis, no on long term IV ABX. Patient now with gait Instability, ADL impairments and Functional impairments.    # Mildly displaced anterior glenoid labrum   #Lumbar Spinal Stenosis  - s/p Right shoulder subacromial injection 10/17/2022  - s/p L2-S1 laminectomy and washout on 10/22/22  - Start Comprehensive Rehab Program: PT/OT, 3hours daily and 5 days weekly  - PT: Focused on improving strength, endurance, coordination, balance, functional mobility, and transfers  - OT: Focused on improving strength, fine motor skills, coordination, posture and ADLs.      #Sepsis, MSSA bacteremia   - Naficillin 2G Q 4hrs till 12/3  - Left DL PICC line inserted on 10/30  - Weekly ESR, CRP, CMP, and CBC     #HTN:- lisinopril 40mg daily    #Pain management  - Tylenol PRN, Oxycodone, Oxycontin, Neurontin, baclofen    #DVT ppx  - Heparin, SCD, TEDs    #GI ppx  - Pepcid 20mg     #Bowel Regimen  - Senna, miralax     #Bladder management  - BS on admission, and q 8 hours (SC if > 400)  - Monitor UO    #FEN   - Diet: Regular    #Skin:  - Skin on admission: Lumbar spine incision with staples KATARZYNA  - Pressure injury/Skin: Turn Q2hrs while in bed, OOB to Chair, PT/OT     #Sleep:   - Maintain quiet hours and low stim environment.  - Melatonin PRN to maximize participation in therapy during the day.     #Precaution  - Fall, Spinal    #GOC  CODE STATUS: FULL CODE    Outpatient Follow-up (Specialty/Name of physician):  Dr. Benito Blanco  [address] [facility] [address]    Dr. Russ Bautista  Neurosurgeon  [address] [facility] [address], [place] [address]  [phone]    MEDICAL PROGNOSIS: GOOD            REHAB POTENTIAL: GOOD             ESTIMATED DISPOSITION: HOME WITH HOME CARE            ELOS: 10-14 Days   EXPECTED THERAPY:     P.T. 2hr/day       O.T. 1hr/day          P&O Unnecessary     EXP FREQUENCY: 5 days per 7 day period     PRESCREEN COMPARISON:   I have reviewed the prescreen information and I have found no relevant changes between the preadmission screening and my post admission evaluation     RATIONALE FOR INPATIENT ADMISSION - Patient demonstrates the following: (check all that apply)  [X] Medically appropriate for rehabilitation admission  [X] Has attainable rehab goals with an appropriate initial discharge plan  [X] Has rehabilitation potential (expected to make a significant improvement within a reasonable period of time)   [X] Requires close medical management by a rehab physician, rehab nursing care, Hospitalist and comprehensive interdisciplinary team (including PT, OT, & or SLP, Prosthetics and Orthotics)   ASSESSMENT/PLAN  This is a 51 YO male with PMH of lumbar Spinal Stenosis, Kidney Stone, and chronic back pain due to sports injuries who presented to [facility] on 10/14 with c/o Right Sided Shoulder Pain and acute on chronic Lower back pain. Shoulder MRI with Mildly displaced anterior glenoid labrum extending from the three to 4:00 position with evidence of an ALPSA lesion. Soft tissue swelling and edema seen in the region of the distal clavicle involving the trapezius musculature. s/p Right shoulder subacromial injection 10/17/2022. Hospital course significant for sepsis, no on long term IV ABX. Patient now with gait Instability, ADL impairments and Functional impairments.    # Mildly displaced anterior glenoid labrum   #Lumbar Spinal Stenosis  - s/p Right shoulder subacromial injection 10/17/2022  - s/p L2-S1 laminectomy and washout on 10/22/22  - Start Comprehensive Rehab Program: PT/OT, 3hours daily and 5 days weekly  - PT: Focused on improving strength, endurance, coordination, balance, functional mobility, and transfers  - OT: Focused on improving strength, fine motor skills, coordination, posture and ADLs.      #Sepsis, MSSA bacteremia   - Naficillin 2G Q 4hrs till 12/3  - Left DL PICC line inserted on 10/30  - Weekly ESR, CRP, CMP, and CBC     #HTN:- lisinopril 40mg daily    #Pain management  - Tylenol PRN, Oxycodone, Oxycontin, Neurontin, baclofen    #DVT ppx  - Heparin, SCD, TEDs    #GI ppx  - Pepcid 20mg     #Bowel Regimen  - Senna, miralax     #Bladder management  - BS on admission, and q 8 hours (SC if > 400)  - Monitor UO    #FEN   - Diet: Regular    #Skin:  - Skin on admission: Lumbar spine incision with staples KATARZYNA  - Pressure injury/Skin: Turn Q2hrs while in bed, OOB to Chair, PT/OT     #Sleep:   - Maintain quiet hours and low stim environment.  - Melatonin PRN to maximize participation in therapy during the day.     #Precaution  - Fall, Spinal    #GOC  CODE STATUS: FULL CODE    Outpatient Follow-up (Specialty/Name of physician):  Dr. Benito Blanco  [address] [facility] [address]    Dr. Russ Bautista  Neurosurgeon  [address] [facility] [address]  [place] [address]  [phone]    ID - Rani France [phone], weekends [phone]    MEDICAL PROGNOSIS: GOOD            REHAB POTENTIAL: GOOD             ESTIMATED DISPOSITION: HOME WITH HOME CARE            ELOS: 10-14 Days   EXPECTED THERAPY:     P.T. 2hr/day       O.T. 1hr/day          P&O Unnecessary     EXP FREQUENCY: 5 days per 7 day period     PRESCREEN COMPARISON:   I have reviewed the prescreen information and I have found no relevant changes between the preadmission screening and my post admission evaluation     RATIONALE FOR INPATIENT ADMISSION - Patient demonstrates the following: (check all that apply)  [X] Medically appropriate for rehabilitation admission  [X] Has attainable rehab goals with an appropriate initial discharge plan  [X] Has rehabilitation potential (expected to make a significant improvement within a reasonable period of time)   [X] Requires close medical management by a rehab physician, rehab nursing care, Hospitalist and comprehensive interdisciplinary team (including PT, OT, & or SLP, Prosthetics and Orthotics)

## 2022-11-01 LAB
ALBUMIN SERPL ELPH-MCNC: 2.4 G/DL — LOW (ref 3.3–5)
ALP SERPL-CCNC: 92 U/L — SIGNIFICANT CHANGE UP (ref 40–120)
ALT FLD-CCNC: 51 U/L — HIGH (ref 10–45)
ANION GAP SERPL CALC-SCNC: 10 MMOL/L — SIGNIFICANT CHANGE UP (ref 5–17)
AST SERPL-CCNC: 27 U/L — SIGNIFICANT CHANGE UP (ref 10–40)
BASOPHILS # BLD AUTO: 0.13 K/UL — SIGNIFICANT CHANGE UP (ref 0–0.2)
BASOPHILS NFR BLD AUTO: 1.7 % — SIGNIFICANT CHANGE UP (ref 0–2)
BILIRUB SERPL-MCNC: 0.9 MG/DL — SIGNIFICANT CHANGE UP (ref 0.2–1.2)
BUN SERPL-MCNC: 12 MG/DL — SIGNIFICANT CHANGE UP (ref 7–23)
CALCIUM SERPL-MCNC: 9.2 MG/DL — SIGNIFICANT CHANGE UP (ref 8.4–10.5)
CHLORIDE SERPL-SCNC: 108 MMOL/L — SIGNIFICANT CHANGE UP (ref 96–108)
CO2 SERPL-SCNC: 24 MMOL/L — SIGNIFICANT CHANGE UP (ref 22–31)
CREAT SERPL-MCNC: 0.73 MG/DL — SIGNIFICANT CHANGE UP (ref 0.5–1.3)
CRP SERPL-MCNC: 25 MG/L — HIGH
EGFR: 109 ML/MIN/1.73M2 — SIGNIFICANT CHANGE UP
EOSINOPHIL # BLD AUTO: 0.14 K/UL — SIGNIFICANT CHANGE UP (ref 0–0.5)
EOSINOPHIL NFR BLD AUTO: 1.9 % — SIGNIFICANT CHANGE UP (ref 0–6)
ERYTHROCYTE [SEDIMENTATION RATE] IN BLOOD: 98 MM/HR — HIGH (ref 0–20)
GLUCOSE SERPL-MCNC: 107 MG/DL — HIGH (ref 70–99)
HCT VFR BLD CALC: 34.2 % — LOW (ref 39–50)
HGB BLD-MCNC: 11.5 G/DL — LOW (ref 13–17)
IMM GRANULOCYTES NFR BLD AUTO: 0.5 % — SIGNIFICANT CHANGE UP (ref 0–0.9)
LYMPHOCYTES # BLD AUTO: 1.29 K/UL — SIGNIFICANT CHANGE UP (ref 1–3.3)
LYMPHOCYTES # BLD AUTO: 17.3 % — SIGNIFICANT CHANGE UP (ref 13–44)
MCHC RBC-ENTMCNC: 31.2 PG — SIGNIFICANT CHANGE UP (ref 27–34)
MCHC RBC-ENTMCNC: 33.6 GM/DL — SIGNIFICANT CHANGE UP (ref 32–36)
MCV RBC AUTO: 92.7 FL — SIGNIFICANT CHANGE UP (ref 80–100)
MONOCYTES # BLD AUTO: 0.71 K/UL — SIGNIFICANT CHANGE UP (ref 0–0.9)
MONOCYTES NFR BLD AUTO: 9.5 % — SIGNIFICANT CHANGE UP (ref 2–14)
NEUTROPHILS # BLD AUTO: 5.16 K/UL — SIGNIFICANT CHANGE UP (ref 1.8–7.4)
NEUTROPHILS NFR BLD AUTO: 69.1 % — SIGNIFICANT CHANGE UP (ref 43–77)
NRBC # BLD: 0 /100 WBCS — SIGNIFICANT CHANGE UP (ref 0–0)
PLATELET # BLD AUTO: 440 K/UL — HIGH (ref 150–400)
POTASSIUM SERPL-MCNC: 3.8 MMOL/L — SIGNIFICANT CHANGE UP (ref 3.5–5.3)
POTASSIUM SERPL-SCNC: 3.8 MMOL/L — SIGNIFICANT CHANGE UP (ref 3.5–5.3)
PROT SERPL-MCNC: 7.1 G/DL — SIGNIFICANT CHANGE UP (ref 6–8.3)
RBC # BLD: 3.69 M/UL — LOW (ref 4.2–5.8)
RBC # FLD: 12.8 % — SIGNIFICANT CHANGE UP (ref 10.3–14.5)
SODIUM SERPL-SCNC: 142 MMOL/L — SIGNIFICANT CHANGE UP (ref 135–145)
WBC # BLD: 7.47 K/UL — SIGNIFICANT CHANGE UP (ref 3.8–10.5)
WBC # FLD AUTO: 7.47 K/UL — SIGNIFICANT CHANGE UP (ref 3.8–10.5)

## 2022-11-01 PROCEDURE — 99223 1ST HOSP IP/OBS HIGH 75: CPT

## 2022-11-01 PROCEDURE — 99222 1ST HOSP IP/OBS MODERATE 55: CPT | Mod: GC

## 2022-11-01 RX ORDER — BACLOFEN 100 %
10 POWDER (GRAM) MISCELLANEOUS EVERY 8 HOURS
Refills: 0 | Status: DISCONTINUED | OUTPATIENT
Start: 2022-11-01 | End: 2022-11-04

## 2022-11-01 RX ORDER — OXYCODONE HYDROCHLORIDE 5 MG/1
20 TABLET ORAL
Refills: 0 | Status: DISCONTINUED | OUTPATIENT
Start: 2022-11-01 | End: 2022-11-07

## 2022-11-01 RX ADMIN — HEPARIN SODIUM 5000 UNIT(S): 5000 INJECTION INTRAVENOUS; SUBCUTANEOUS at 14:26

## 2022-11-01 RX ADMIN — GABAPENTIN 300 MILLIGRAM(S): 400 CAPSULE ORAL at 05:48

## 2022-11-01 RX ADMIN — OXYCODONE HYDROCHLORIDE 20 MILLIGRAM(S): 5 TABLET ORAL at 15:25

## 2022-11-01 RX ADMIN — OXYCODONE HYDROCHLORIDE 20 MILLIGRAM(S): 5 TABLET ORAL at 05:49

## 2022-11-01 RX ADMIN — Medication 1 TABLET(S): at 11:37

## 2022-11-01 RX ADMIN — OXYCODONE HYDROCHLORIDE 20 MILLIGRAM(S): 5 TABLET ORAL at 11:20

## 2022-11-01 RX ADMIN — NAFCILLIN 200 GRAM(S): 10 INJECTION, POWDER, FOR SOLUTION INTRAVENOUS at 21:46

## 2022-11-01 RX ADMIN — NAFCILLIN 200 GRAM(S): 10 INJECTION, POWDER, FOR SOLUTION INTRAVENOUS at 14:26

## 2022-11-01 RX ADMIN — NAFCILLIN 200 GRAM(S): 10 INJECTION, POWDER, FOR SOLUTION INTRAVENOUS at 02:31

## 2022-11-01 RX ADMIN — OXYCODONE HYDROCHLORIDE 20 MILLIGRAM(S): 5 TABLET ORAL at 01:31

## 2022-11-01 RX ADMIN — OXYCODONE HYDROCHLORIDE 20 MILLIGRAM(S): 5 TABLET ORAL at 14:26

## 2022-11-01 RX ADMIN — GABAPENTIN 300 MILLIGRAM(S): 400 CAPSULE ORAL at 21:46

## 2022-11-01 RX ADMIN — GABAPENTIN 300 MILLIGRAM(S): 400 CAPSULE ORAL at 14:26

## 2022-11-01 RX ADMIN — NAFCILLIN 200 GRAM(S): 10 INJECTION, POWDER, FOR SOLUTION INTRAVENOUS at 10:22

## 2022-11-01 RX ADMIN — LISINOPRIL 40 MILLIGRAM(S): 2.5 TABLET ORAL at 05:41

## 2022-11-01 RX ADMIN — OXYCODONE HYDROCHLORIDE 20 MILLIGRAM(S): 5 TABLET ORAL at 20:41

## 2022-11-01 RX ADMIN — Medication 10 MILLIGRAM(S): at 11:37

## 2022-11-01 RX ADMIN — Medication 5 MILLIGRAM(S): at 05:41

## 2022-11-01 RX ADMIN — OXYCODONE HYDROCHLORIDE 20 MILLIGRAM(S): 5 TABLET ORAL at 19:26

## 2022-11-01 RX ADMIN — OXYCODONE HYDROCHLORIDE 20 MILLIGRAM(S): 5 TABLET ORAL at 18:30

## 2022-11-01 RX ADMIN — HEPARIN SODIUM 5000 UNIT(S): 5000 INJECTION INTRAVENOUS; SUBCUTANEOUS at 05:41

## 2022-11-01 RX ADMIN — FAMOTIDINE 20 MILLIGRAM(S): 10 INJECTION INTRAVENOUS at 11:36

## 2022-11-01 RX ADMIN — Medication 10 MILLIGRAM(S): at 20:01

## 2022-11-01 RX ADMIN — OXYCODONE HYDROCHLORIDE 20 MILLIGRAM(S): 5 TABLET ORAL at 02:08

## 2022-11-01 RX ADMIN — NAFCILLIN 200 GRAM(S): 10 INJECTION, POWDER, FOR SOLUTION INTRAVENOUS at 18:38

## 2022-11-01 RX ADMIN — HEPARIN SODIUM 5000 UNIT(S): 5000 INJECTION INTRAVENOUS; SUBCUTANEOUS at 21:46

## 2022-11-01 RX ADMIN — OXYCODONE HYDROCHLORIDE 20 MILLIGRAM(S): 5 TABLET ORAL at 10:22

## 2022-11-01 RX ADMIN — CHLORHEXIDINE GLUCONATE 1 APPLICATION(S): 213 SOLUTION TOPICAL at 11:40

## 2022-11-01 RX ADMIN — OXYCODONE HYDROCHLORIDE 20 MILLIGRAM(S): 5 TABLET ORAL at 22:38

## 2022-11-01 RX ADMIN — OXYCODONE HYDROCHLORIDE 20 MILLIGRAM(S): 5 TABLET ORAL at 20:01

## 2022-11-01 RX ADMIN — NAFCILLIN 200 GRAM(S): 10 INJECTION, POWDER, FOR SOLUTION INTRAVENOUS at 05:40

## 2022-11-01 NOTE — DIETITIAN INITIAL EVALUATION ADULT - ORAL INTAKE PTA/DIET HISTORY
Patient Does Follow High Protein & Low Carb Diet @Home  Consumes 2 Meals a Day   Usually Cooks For Self But Occasionally Eats Out  Does Take Vitamin/Supplements @Home (Multivitamin, Fish Oil, Tumeric, Berenice, Protein)

## 2022-11-01 NOTE — DIETITIAN INITIAL EVALUATION ADULT - OTHER INFO
Initial Nutrition Assessment   52yr Old Male   Denies Food Allergy/Intolerance  Tolerates Diet Well - No Chewing/Swallowing Complications (Per Patient)  Surgical Incision on L-Spine  No Pressure Ulcers (as Per Nursing Flow Sheets)  No Edema Noted (as Per Nursing Flow Sheets)  No Recent Nausea/Vomiting/Diarrhea/Constipation (as Per Patient)

## 2022-11-01 NOTE — CONSULT NOTE ADULT - ASSESSMENT
ASSESSMENT/PLAN  This is a 51 YO male with PMH of lumbar Spinal Stenosis, Kidney Stone, and chronic back pain due to sports injuries who presented to [facility] on 10/14 with c/o Right Sided Shoulder Pain and acute on chronic Lower back pain. Shoulder MRI with Mildly displaced anterior glenoid labrum extending from the three to 4:00 position with evidence of an ALPSA lesion. Soft tissue swelling and edema seen in the region of the distal clavicle involving the trapezius musculature. s/p Right shoulder subacromial injection 10/17/2022. Hospital course significant for sepsis, no on long term IV ABX. Patient now with gait Instability, ADL impairments and Functional impairments.    #Mildly displaced anterior glenoid labrum   #Lumbar Spinal Stenosis  - s/p Right shoulder subacromial injection 10/17/2022  - s/p L2-S1 laminectomy and washout on 10/22/22  - pain and bowel regimen as per rehab      #Sepsis due to MSSA bacteremia due to epidural abscess and bilateral psoas abscess  - s/p wound washout   - repeat blood cx on 10/22 negative  - Naficillin 2G Q 4hrs till 12/3  - Left DL PICC line inserted on 10/30    #HTN  - lisinopril 40mg daily  - BP acceptable for now. control pain      #DVT ppx  - Heparin, SCD, TEDs    #GI ppx  - Pepcid 20mg    ASSESSMENT/PLAN  This is a 51 YO male with PMH of lumbar Spinal Stenosis, Kidney Stone, and chronic back pain due to sports injuries who presented to [facility] on 10/14 with c/o Right Sided Shoulder Pain and acute on chronic Lower back pain. Shoulder MRI with Mildly displaced anterior glenoid labrum extending from the three to 4:00 position with evidence of an ALPSA lesion. Soft tissue swelling and edema seen in the region of the distal clavicle involving the trapezius musculature. s/p Right shoulder subacromial injection 10/17/2022. Hospital course significant for sepsis, no on long term IV ABX. Patient now with gait Instability, ADL impairments and Functional impairments.    #Mildly displaced anterior glenoid labrum   #Lumbar Spinal Stenosis  - s/p Right shoulder subacromial injection 10/17/2022  - s/p L2-S1 laminectomy and washout on 10/22/22  - pain and bowel regimen as per rehab    #MSSA bacteremia due to epidural abscess and bilateral psoas abscess  - s/p wound washout   - repeat blood cx on 10/22 negative  - Naficillin 2G Q 4hrs till 12/3  - Left DL PICC line inserted on 10/30    #HTN  - lisinopril 40mg daily  - BP acceptable for now. control pain    #DVT ppx  - Heparin, SCD, TEDs    #GI ppx  - Pepcid 20mg

## 2022-11-01 NOTE — DIETITIAN INITIAL EVALUATION ADULT - PERTINENT MEDS FT
MEDICATIONS  (STANDING):  chlorhexidine 2% Cloths 1 Application(s) Topical daily  famotidine    Tablet 20 milliGRAM(s) Oral daily  gabapentin 300 milliGRAM(s) Oral three times a day  heparin   Injectable 5000 Unit(s) SubCutaneous every 8 hours  lisinopril 40 milliGRAM(s) Oral daily  multivitamin 1 Tablet(s) Oral daily  nafcillin  IVPB 2 Gram(s) IV Intermittent every 4 hours  oxyCODONE  ER Tablet 20 milliGRAM(s) Oral <User Schedule>  polyethylene glycol 3350 17 Gram(s) Oral daily  senna 2 Tablet(s) Oral at bedtime    MEDICATIONS  (PRN):  acetaminophen     Tablet .. 650 milliGRAM(s) Oral every 6 hours PRN Temp greater or equal to 38C (100.4F), Mild Pain (1 - 3)  baclofen 10 milliGRAM(s) Oral every 8 hours PRN Muscle Spasm  meclizine 25 milliGRAM(s) Oral three times a day PRN Dizziness  oxyCODONE    IR 20 milliGRAM(s) Oral every 4 hours PRN Severe Pain (7 - 10)  simethicone 80 milliGRAM(s) Chew four times a day PRN Heartburn

## 2022-11-01 NOTE — DIETITIAN INITIAL EVALUATION ADULT - PERTINENT LABORATORY DATA
11-01    142  |  108  |  12  ----------------------------<  107<H>  3.8   |  24  |  0.73    Ca    9.2      01 Nov 2022 06:25    TPro  7.1  /  Alb  2.4<L>  /  TBili  0.9  /  DBili  x   /  AST  27  /  ALT  51<H>  /  AlkPhos  92  11-01

## 2022-11-01 NOTE — DIETITIAN INITIAL EVALUATION ADULT - ADD RECOMMEND
1) Monitor Weights, Intake, Tolerance, Skin, POCT & Labwork  2) Education Provided on Need for Supplementation  3) Ensure Plant Based 8oz PO BID  4) Continue Nutrition Plan of Care

## 2022-11-01 NOTE — DIETITIAN NUTRITION RISK NOTIFICATION - TREATMENT: THE FOLLOWING DIET HAS BEEN RECOMMENDED
Ensure Plant Based 8oz PO BID (Provides 360kcal & 40grams of Protein)   Education Provided on Need for Supplementation

## 2022-11-01 NOTE — DIETITIAN INITIAL EVALUATION ADULT - NS FNS DIET ORDER
on Regular Diet (IDDSI Level 7) w/ Thin Liquids (IDDSI Level 0)   Recommend Initiate Nutrition Supplement - Ensure Plant Based 8oz PO BID  Education Provided on Need for Supplementation

## 2022-11-02 DIAGNOSIS — G06.1 INTRASPINAL ABSCESS AND GRANULOMA: ICD-10-CM

## 2022-11-02 PROCEDURE — 99232 SBSQ HOSP IP/OBS MODERATE 35: CPT

## 2022-11-02 PROCEDURE — 71045 X-RAY EXAM CHEST 1 VIEW: CPT | Mod: 26

## 2022-11-02 PROCEDURE — 90791 PSYCH DIAGNOSTIC EVALUATION: CPT

## 2022-11-02 RX ORDER — ALBUTEROL 90 UG/1
2 AEROSOL, METERED ORAL ONCE
Refills: 0 | Status: COMPLETED | OUTPATIENT
Start: 2022-11-02 | End: 2023-10-01

## 2022-11-02 RX ADMIN — Medication 10 MILLIGRAM(S): at 13:01

## 2022-11-02 RX ADMIN — Medication 10 MILLIGRAM(S): at 04:32

## 2022-11-02 RX ADMIN — NAFCILLIN 200 GRAM(S): 10 INJECTION, POWDER, FOR SOLUTION INTRAVENOUS at 18:08

## 2022-11-02 RX ADMIN — GABAPENTIN 300 MILLIGRAM(S): 400 CAPSULE ORAL at 05:10

## 2022-11-02 RX ADMIN — OXYCODONE HYDROCHLORIDE 20 MILLIGRAM(S): 5 TABLET ORAL at 14:56

## 2022-11-02 RX ADMIN — OXYCODONE HYDROCHLORIDE 20 MILLIGRAM(S): 5 TABLET ORAL at 06:47

## 2022-11-02 RX ADMIN — OXYCODONE HYDROCHLORIDE 20 MILLIGRAM(S): 5 TABLET ORAL at 00:17

## 2022-11-02 RX ADMIN — HEPARIN SODIUM 5000 UNIT(S): 5000 INJECTION INTRAVENOUS; SUBCUTANEOUS at 21:28

## 2022-11-02 RX ADMIN — OXYCODONE HYDROCHLORIDE 20 MILLIGRAM(S): 5 TABLET ORAL at 07:45

## 2022-11-02 RX ADMIN — OXYCODONE HYDROCHLORIDE 20 MILLIGRAM(S): 5 TABLET ORAL at 18:56

## 2022-11-02 RX ADMIN — NAFCILLIN 200 GRAM(S): 10 INJECTION, POWDER, FOR SOLUTION INTRAVENOUS at 10:50

## 2022-11-02 RX ADMIN — NAFCILLIN 200 GRAM(S): 10 INJECTION, POWDER, FOR SOLUTION INTRAVENOUS at 05:09

## 2022-11-02 RX ADMIN — OXYCODONE HYDROCHLORIDE 20 MILLIGRAM(S): 5 TABLET ORAL at 03:15

## 2022-11-02 RX ADMIN — LISINOPRIL 40 MILLIGRAM(S): 2.5 TABLET ORAL at 05:10

## 2022-11-02 RX ADMIN — Medication 10 MILLIGRAM(S): at 21:28

## 2022-11-02 RX ADMIN — NAFCILLIN 200 GRAM(S): 10 INJECTION, POWDER, FOR SOLUTION INTRAVENOUS at 14:56

## 2022-11-02 RX ADMIN — GABAPENTIN 300 MILLIGRAM(S): 400 CAPSULE ORAL at 21:28

## 2022-11-02 RX ADMIN — GABAPENTIN 300 MILLIGRAM(S): 400 CAPSULE ORAL at 13:01

## 2022-11-02 RX ADMIN — SIMETHICONE 80 MILLIGRAM(S): 80 TABLET, CHEWABLE ORAL at 06:49

## 2022-11-02 RX ADMIN — SIMETHICONE 80 MILLIGRAM(S): 80 TABLET, CHEWABLE ORAL at 20:21

## 2022-11-02 RX ADMIN — OXYCODONE HYDROCHLORIDE 20 MILLIGRAM(S): 5 TABLET ORAL at 20:19

## 2022-11-02 RX ADMIN — OXYCODONE HYDROCHLORIDE 20 MILLIGRAM(S): 5 TABLET ORAL at 11:50

## 2022-11-02 RX ADMIN — OXYCODONE HYDROCHLORIDE 20 MILLIGRAM(S): 5 TABLET ORAL at 09:10

## 2022-11-02 RX ADMIN — NAFCILLIN 200 GRAM(S): 10 INJECTION, POWDER, FOR SOLUTION INTRAVENOUS at 21:27

## 2022-11-02 RX ADMIN — OXYCODONE HYDROCHLORIDE 20 MILLIGRAM(S): 5 TABLET ORAL at 23:45

## 2022-11-02 RX ADMIN — OXYCODONE HYDROCHLORIDE 20 MILLIGRAM(S): 5 TABLET ORAL at 23:09

## 2022-11-02 RX ADMIN — CHLORHEXIDINE GLUCONATE 1 APPLICATION(S): 213 SOLUTION TOPICAL at 11:31

## 2022-11-02 RX ADMIN — OXYCODONE HYDROCHLORIDE 20 MILLIGRAM(S): 5 TABLET ORAL at 15:55

## 2022-11-02 RX ADMIN — OXYCODONE HYDROCHLORIDE 20 MILLIGRAM(S): 5 TABLET ORAL at 10:50

## 2022-11-02 RX ADMIN — OXYCODONE HYDROCHLORIDE 20 MILLIGRAM(S): 5 TABLET ORAL at 08:11

## 2022-11-02 RX ADMIN — NAFCILLIN 200 GRAM(S): 10 INJECTION, POWDER, FOR SOLUTION INTRAVENOUS at 01:58

## 2022-11-02 RX ADMIN — OXYCODONE HYDROCHLORIDE 20 MILLIGRAM(S): 5 TABLET ORAL at 02:40

## 2022-11-02 RX ADMIN — Medication 1 TABLET(S): at 11:24

## 2022-11-02 RX ADMIN — HEPARIN SODIUM 5000 UNIT(S): 5000 INJECTION INTRAVENOUS; SUBCUTANEOUS at 05:10

## 2022-11-02 RX ADMIN — FAMOTIDINE 20 MILLIGRAM(S): 10 INJECTION INTRAVENOUS at 11:24

## 2022-11-02 RX ADMIN — OXYCODONE HYDROCHLORIDE 20 MILLIGRAM(S): 5 TABLET ORAL at 20:49

## 2022-11-02 RX ADMIN — HEPARIN SODIUM 5000 UNIT(S): 5000 INJECTION INTRAVENOUS; SUBCUTANEOUS at 13:01

## 2022-11-02 NOTE — CHART NOTE - NSCHARTNOTEFT_GEN_A_CORE
REHABILITATION DIAGNOSIS/IMPAIRMENT GROUP CODE:  Epidural abscess- lumbar     COMORBIDITIES/COMPLICATING CONDITIONS IMPACTING REHABILITATION:  HEALTH ISSUES - PROBLEM Dx:  S/P L2- S1 lami and washout   MSSA bacteremia   Anxiety  Pain     PAST MEDICAL & SURGICAL HISTORY:  Concussionx 12,  in the past all from sports related injuries      Renal Calculi  2011 had a cystoscopy and since then has passed 2 on his own      Anxiety      S/P epidural steroid injection  multiple, last August 8/10/22      Hypertension      Cervical herniated disc  x5, C3-4, C4-5, C5-6, C6-7, C7-T1      Thoracic disc herniation  x2, T7 and T8 and disc bulge at T11      Lumbar herniated disc  x5, L1-2. L2-3, L3-4, L4-5 and L5-S1, medrol dose pack June 2022      DDD (degenerative disc disease), lumbar      History of scoliosis      History of 2019 novel coronavirus disease (COVID-19)  December 2020 and December 2021, mild symptoms      History of vertigo      History of headache      Post concussion syndrome      History of thyroid nodule  followed with ultrasound      Elevated liver enzymes  history of, related to percocet use      Liver cyst  incidental finding      Umbilical hernia      Osteoarthritis      History of skin cancer  squamos cell      History of cystoscopy  stone extraction 2011      H/O right inguinal hernia repair  1995      H/O arthroscopy of right knee  2020      H/O arthroscopy of left knee  2021      History of Mohs surgery for squamous cell carcinoma of skin  x2 April 2021 forehead and April 2022 left hand          Based upon consideration of the patient's impairments, functional status, complicating conditions and any other contributing factors and after information garnered from the assessments of all therapy disciplines involved in treating the patient and other pertinent clinicians:    INTERDISCIPLINARY REHABILITATION INTERVENTIONS:    [ x  ] Transfer Training  [  x ] Bed Mobility  [  x ] Therapeutic Exercise  [  x ] Balance/Coordination Exercises  [ x  ] Locomotion retraining  [ x  ] Stairs  [x   ] Functional Transfer Training  [ x  ] Bowel/Bladder program  [ x  ] Pain Management  [ x  ] Skin/Wound Care  [   ] Visual/Perceptual Training  [   ] Therapeutic Recreation Activities  [  x ] Neuromuscular Re-education  [ x  ] Activities of Daily Living  [   ] Speech Exercise  [   ] Swallowing Exercises  [   ] Vital Stim  [   ] Dietary Supplements  [   ] Calorie Count  [   ] Cognitive Exercises  [   ] Cognitive/Linguistic Treatment  [   ] Behavior Program  [   ] Neuropsych Therapy  [   ] Patient/Family Counseling  [   ] Family Training  [   ] Community Re-entry  [   ] Orthotic Evaluation  [   ] Prosthetic Eval/Training    MEDICAL PROGNOSIS:  fair    REHAB POTENTIAL:  fair   EXPECTED DAILY THERAPY:         PT: 2hrs/day       OT:1 hr/day        ST:na       P&O:na    EXPECTED INTENSITY OF PROGRAM:  3 hrs/day     EXPECTED FREQUENCY OF PROGRAM:  5 days/week     ESTIMATED LOS:  10-14 days     ESTIMATED DISPOSITION:  home versus DORY due to IV  abx     INTERDISCIPLINARY FUNCTIONAL OUTCOMES/GOALS:         Gait/Mobility:modified independent       Transfers:modified independent       ADLs:modified independent       Functional Transfers:modified independent       Medication Management:modified independent       Communication:na       Cognitive:na       Dysphagia:na       Bladder:modified independent       Bowel:modified independent

## 2022-11-02 NOTE — PROGRESS NOTE ADULT - SUBJECTIVE AND OBJECTIVE BOX
Patient is a 52y old  Male who presents with a chief complaint of Status post arthrodesis    HPI:  This is a 51 YO male with PMH of lumbar Spinal Stenosis, Kidney Stone, and chronic back pain due to sports injuries who presented to [facility] on 10/14 with c/o Right Sided Shoulder Pain and acute on chronic Lower back pain. Pt seen by neurosurgery no acute intervention outpt FU. Shoulder XR negative. Shoulder Mri with Mildly displaced anterior glenoid labrum extending from the three to 4:00 position with evidence of an ALPSA lesion. Soft tissue swelling and edema seen in the region of the distal clavicle involving the trapezius musculature. Per Ortho, no acute orthopedic surgical intervention warranted. S/p Right shoulder subacromial injection 10/17/2022. Hospital course significant for RRT on 10/18 due to rigors and severe pain, Temp 102.8.RRT on 10/20 for CP and SOB. Bacteremia Blood cx 's + GPC MSSA   L2- S1 laminectomy and evacuation of epidural abscess and washout on 10/22. Seen by ID, nafcillin 2 q 4hrs till 12/3 with weekly sed rate, crp, cmp and cbc. PICC line inserted on 10/30  KD negative for vegetation      TODAY'S SUBJECTIVE & REVIEW OF SYMPTOMS:  Patient seen in PT gym this am   Pain better controlled  Would like to use own TENS machine to right SI joint pain   He states that he slept ok   Denies any numbness     ROS:  Denies HA/CP/palpitations  Denies abdominal pain or nausea  GI- + BM 11/1   Denies any dysuria or urinary incontinence       PHYSICAL EXAM    Vital Signs Last 24 Hrs  T(C): 36.7 (02 Nov 2022 08:16), Max: 36.9 (01 Nov 2022 19:58)  T(F): 98.1 (02 Nov 2022 08:16), Max: 98.4 (01 Nov 2022 19:58)  HR: 82 (02 Nov 2022 08:16) (65 - 99)  BP: 126/87 (02 Nov 2022 08:16) (126/87 - 152/97)  BP(mean): --  RR: 16 (02 Nov 2022 08:16) (16 - 16)  SpO2: 98% (02 Nov 2022 08:16) (95% - 98%)    Parameters below as of 02 Nov 2022 08:16  Patient On (Oxygen Delivery Method): room air        Constitutional - NAD, Comfortable  Chest - breathing comfortably   Cardiovascular - well perfused   Abdomen - BS+, Soft, NTND  Extremities - No C/C/E, No calf tenderness   Motor antigravity strength in al extremities  Back - incision with staples   Psychiatric - Mood stable, Affect WNL      TPro  7.1  /  Alb  2.4<L>  /  TBili  0.9  /  DBili  x   /  AST  27  /  ALT  51<H>  /  AlkPhos  92  11-01    MEDICATIONS  (STANDING):  chlorhexidine 2% Cloths 1 Application(s) Topical daily  famotidine    Tablet 20 milliGRAM(s) Oral daily  gabapentin 300 milliGRAM(s) Oral three times a day  heparin   Injectable 5000 Unit(s) SubCutaneous every 8 hours  lisinopril 40 milliGRAM(s) Oral daily  multivitamin 1 Tablet(s) Oral daily  nafcillin  IVPB 2 Gram(s) IV Intermittent every 4 hours  oxyCODONE  ER Tablet 20 milliGRAM(s) Oral <User Schedule>  polyethylene glycol 3350 17 Gram(s) Oral daily  senna 2 Tablet(s) Oral at bedtime    MEDICATIONS  (PRN):  acetaminophen     Tablet .. 650 milliGRAM(s) Oral every 6 hours PRN Temp greater or equal to 38C (100.4F), Mild Pain (1 - 3)  baclofen 10 milliGRAM(s) Oral every 8 hours PRN Muscle Spasm  meclizine 25 milliGRAM(s) Oral three times a day PRN Dizziness  oxyCODONE    IR 20 milliGRAM(s) Oral every 4 hours PRN Severe Pain (7 - 10)  simethicone 80 milliGRAM(s) Chew four times a day PRN Heartburn

## 2022-11-02 NOTE — PROGRESS NOTE ADULT - ASSESSMENT
53 YO male with PMH of lumbar Spinal Stenosis, Kidney Stone, and chronic back pain admitted to rehab after recent hospitalisation for increased pain and finding of Bacteremia Blood cx 's + GPC MSSA , L2- S1 laminectomy and evacuation of epidural abscess and washout on 10/22.. s/p Right shoulder subacromial injection 10/17/2022.      Continue  Comprehensive Rehab Program: PT/OT, 3hours daily and 5 days weekly    #Sepsis, Lumbar epidural abscess MSSA bacteremia, - s/p L2-S1 laminectomy and washout on 10/22/22   - Naficillin 2G Q 4hrs till 12/3  - Left  UE PICC line inserted on 10/30  - Weekly ESR, CRP, CMP, and CBC     #HTN:- lisinopril 40mg daily    #Pain management  - Tylenol PRN, Oxycodone, Oxycontin, Neurontin,   baclofen 10mg tid prn spasms   May use TENS to right SI region prn     #DVT ppx  - Heparin, SCD, TEDs    #GI ppx  - Pepcid 20mg     #Bowel Regimen:- Senna, miralax     #Bladder management: continent . Will dc bladder scans     #FEN   - Diet: Regular    #Skin:  - Skin on admission: Lumbar spine incision with staples KATARZYNA  - Pressure injury/Skin: Turn Q2hrs while in bed, OOB to Chair, PT/OT     #Sleep: meds as needed     #Precaution: - Fall, Spinal  To clarify with NS regarding use of LSO    51 YO male with PMH of lumbar Spinal Stenosis, Kidney Stone, and chronic back pain admitted to rehab after recent hospitalisation for increased pain and finding of Bacteremia Blood cx 's + GPC MSSA , L2- S1 laminectomy and evacuation of epidural abscess and washout on 10/22.. s/p Right shoulder subacromial injection 10/17/2022.      Continue  Comprehensive Rehab Program: PT/OT, 3hours daily and 5 days weekly    #Sepsis, Lumbar epidural abscess MSSA bacteremia, - s/p L2-S1 laminectomy and washout on 10/22/22   - Naficillin 2G Q 4hrs till 12/3  - Left  UE PICC line inserted on 10/30  - Weekly ESR, CRP, CMP, and CBC     #HTN:- lisinopril 40mg daily    #Pain management  - Tylenol PRN, Oxycodone, Oxycontin, Neurontin,   baclofen 10mg tid prn spasms   May use TENS to right SI region prn     Mood: anxiety about current medical course. Neuropsych consult requested for supportive counselling     #DVT ppx  - Heparin, SCD, TEDs    #GI ppx  - Pepcid 20mg     #Bowel Regimen:- Senna, miralax     #Bladder management: continent . Will dc bladder scans     #FEN   - Diet: Regular    #Skin:  - Skin on admission: Lumbar spine incision with staples KATARZYNA  - Pressure injury/Skin: Turn Q2hrs while in bed, OOB to Chair, PT/OT     #Sleep: meds as needed     #Precaution: - Fall, Spinal  To clarify with NS regarding use of LSO    53 YO male with PMH of lumbar Spinal Stenosis, Kidney Stone, and chronic back pain admitted to rehab after recent hospitalisation for increased pain and finding of Bacteremia Blood cx 's + GPC MSSA , L2- S1 laminectomy and evacuation of epidural abscess and washout on 10/22.. s/p Right shoulder subacromial injection 10/17/2022.      Continue  Comprehensive Rehab Program: PT/OT, 3hours daily and 5 days weekly    #Sepsis, Lumbar epidural abscess MSSA bacteremia, - s/p L2-S1 laminectomy and washout on 10/22/22- Further chart review of ID notes from outside hospital also shows bilateral psoas abscess    - Naficillin 2G Q 4hrs till 12/3  - Left  UE PICC line inserted on 10/30  - Weekly ESR, CRP, CMP, and CBC   KD negative for vegetation   Abd/pelvis - no drainable abscess   Blood cultures 10/22 - NG     #HTN:- lisinopril 40mg daily    #Pain management  - Tylenol PRN, Oxycodone, Oxycontin, Neurontin,   baclofen 10mg tid prn spasms   May use TENS to right SI region prn     Mood: anxiety about current medical course. Neuropsych consult requested for supportive counselling     #DVT ppx  - Heparin, SCD, TEDs    #GI ppx  - Pepcid 20mg     #Bowel Regimen:- Senna, miralax     #Bladder management: continent . Will dc bladder scans     #FEN   - Diet: Regular    #Skin:  - Skin on admission: Lumbar spine incision with staples KATARZYNA  - Pressure injury/Skin: Turn Q2hrs while in bed, OOB to Chair, PT/OT     #Sleep: meds as needed     #Precaution: - Fall, Spinal  To clarify with NS regarding use of LSO     Labs in am

## 2022-11-02 NOTE — CHART NOTE - NSCHARTNOTEFT_GEN_A_CORE
Called by RN for wheezing. Patient c/o chest tightness and wheezing starting a few minutes ago. Patient noted to have wheezing in bilateral lung bases. Denies any chest pain, palpitations, orthopnea, or lightheadedness. VS stable. Had prior CXR on 10/31 to evaluate for PICC placement which showed no evidence of chest disease at that time. Will order new CXR to evaluate for PNA vs atelectasis. Incentive spirometry ordered. Proventil HFA given x1. Will continue to monitor, RN to call for any changes. Called by RN for wheezing. Patient c/o chest tightness and wheezing starting a few minutes ago. Patient noted to have wheezing in bilateral lung bases. Denies any chest pain, palpitations, orthopnea, or lightheadedness. VS stable. Had prior CXR on 10/31 to evaluate for PICC placement which showed no evidence of chest disease at that time. Patient noted to be anxious. ? component of anxiety contributing to chest tightness. Will order new CXR to evaluate for PNA vs atelectasis. Incentive spirometry ordered. Proventil HFA given x1. Will continue to monitor, RN to call for any changes.

## 2022-11-02 NOTE — CONSULT NOTE ADULT - SUBJECTIVE AND OBJECTIVE BOX
Patient seen at bedside for 70 minute, initial psychology consultation. Medical records are reviewed. Per records: Ree Bentley is a 52-year-old male with PMH of lumbar spinal stenosis, cervical herniated discs, thoracic disc herniation, multiple sports-related concussions (12), squamous cell skin cancer, osteoarthritis, headaches, scoliosis, kidney stone, anxiety, and chronic back pain due to sports injuries who presented to [facility] on 10/14/2022 with c/o Right Sided Shoulder Pain and acute chronic Lower back pain. Pt seen by neurosurgery-no acute intervention and outpt FU recommened. Shoulder XR negative. Shoulder MRI with mildly displaced anterior glenoid labrum extending from the 3 to 4:00 position with evidence of an ALPSA lesion. Soft tissue swelling and edema seen in the region of the distal clavicle involving the trapezius musculature. Per Ortho, no acute orthopedic surgical intervention warranted. S/p Right shoulder subacromial injection 10/17/2022. Hospital course significant for RRT on 10/18/2022 due to rigors and severe pain, temp 102.8. RRT on 10/20/2022 for CP and SOB. Bacteremia Blood cx's +GPC MSSA L2- S1 laminectomy and evacuation of epidural abscess and washout on 10/22/2022. PICC line inserted on 10/30.       
HPI:  This is a 51 YO male with PMH of lumbar Spinal Stenosis, Kidney Stone, and chronic back pain due to sports injuries who presented to [facility] on 10/14 with c/o Right Sided Shoulder Pain and acute on chronic Lower back pain. Pt seen by neurosurgery no acute intervention outpt FU. Shoulder XR negative. Shoulder Mri with Mildly displaced anterior glenoid labrum extending from the three to 4:00 position with evidence of an ALPSA lesion. Soft tissue swelling and edema seen in the region of the distal clavicle involving the trapezius musculature. Per Ortho, no acute orthopedic surgical intervention warranted. S/p Right shoulder subacromial injection 10/17/2022. Hospital course significant for RRT on 10/18 due to rigors and severe pain, Temp 102.8.RRT on 10/20 for CP and SOB. L2S1 laminectomy and washout on 10/22. Seen by ID, nafcillin 2 q 4hrs till 12/3 with weekly sed rate, crp, cmp and cbc. PICC line inserted on 10/30. Normal 5/5 strength is all tested muscle groups.   (31 Oct 2022 15:06)    reports having lower back pain, feels like muscle spasms, 10/10 at its worse, non radiating and comes and goes. pain worse with movement. no numbness/tingling. no fever, chills, sob, cp, abd pain. right shoulder pain is well controlled and ROM getting better as per pt.     PAST MEDICAL & SURGICAL HISTORY:  Concussion  x 12,  in the past all from sports related injuries      Renal Calculi  2011 had a cystoscopy and since then has passed 2 on his own      Anxiety      S/P epidural steroid injection  multiple, last August 8/10/22      Hypertension      Cervical herniated disc  x5, C3-4, C4-5, C5-6, C6-7, C7-T1      Thoracic disc herniation  x2, T7 and T8 and disc bulge at T11      Lumbar herniated disc  x5, L1-2. L2-3, L3-4, L4-5 and L5-S1, medrol dose pack June 2022      DDD (degenerative disc disease), lumbar      History of scoliosis      History of 2019 novel coronavirus disease (COVID-19)  December 2020 and December 2021, mild symptoms      History of vertigo      History of headache      Post concussion syndrome      History of thyroid nodule  followed with ultrasound      Elevated liver enzymes  history of, related to percocet use      Liver cyst  incidental finding      Umbilical hernia      Osteoarthritis      History of skin cancer  squamos cell      History of cystoscopy  stone extraction 2011      H/O right inguinal hernia repair  1995      H/O arthroscopy of right knee  2020      H/O arthroscopy of left knee  2021      History of Mohs surgery for squamous cell carcinoma of skin  x2 April 2021 forehead and April 2022 left hand    social history: denies smoking. social drinking. denies illicit drug use    Family history: father and mother has a history of HTN. father history of stroke    Patient is a 52y old  Male who presents with a chief complaint of Lumbar Laminectomy (31 Oct 2022 15:06)      ALLERGIES:  No Known Allergies    MEDICATIONS  (STANDING):  baclofen 5 milliGRAM(s) Oral every 6 hours  chlorhexidine 2% Cloths 1 Application(s) Topical daily  famotidine    Tablet 20 milliGRAM(s) Oral daily  gabapentin 300 milliGRAM(s) Oral three times a day  heparin   Injectable 5000 Unit(s) SubCutaneous every 8 hours  lisinopril 40 milliGRAM(s) Oral daily  multivitamin 1 Tablet(s) Oral daily  nafcillin  IVPB 2 Gram(s) IV Intermittent every 4 hours  oxyCODONE  ER Tablet 20 milliGRAM(s) Oral every 12 hours  polyethylene glycol 3350 17 Gram(s) Oral daily  senna 2 Tablet(s) Oral at bedtime    MEDICATIONS  (PRN):  acetaminophen     Tablet .. 650 milliGRAM(s) Oral every 6 hours PRN Temp greater or equal to 38C (100.4F), Mild Pain (1 - 3)  meclizine 25 milliGRAM(s) Oral three times a day PRN Dizziness  oxyCODONE    IR 20 milliGRAM(s) Oral every 4 hours PRN Severe Pain (7 - 10)  simethicone 80 milliGRAM(s) Chew four times a day PRN Heartburn    Vital Signs Last 24 Hrs  T(F): 98.3 (01 Nov 2022 08:43), Max: 98.3 (01 Nov 2022 08:43)  HR: 100 (01 Nov 2022 08:43) (84 - 100)  BP: 139/99 (01 Nov 2022 08:43) (125/91 - 144/89)  RR: 16 (01 Nov 2022 08:43) (16 - 17)  SpO2: 95% (01 Nov 2022 08:43) (95% - 96%)  I&O's Summary    PHYSICAL EXAM:  General: NAD, A/O x 3  ENT: MMM  Neck: Supple, No JVD  Lungs: Clear to auscultation bilaterally  Cardio: RRR, S1/S2, No murmurs  Abdomen: Soft, Nontender, Nondistended; Bowel sounds present  Extremities: No calf tenderness, No pitting edema      LABS:                        11.5   7.47  )-----------( 440      ( 01 Nov 2022 06:25 )             34.2     11-01    142  |  108  |  12  ----------------------------<  107  3.8   |  24  |  0.73    Ca    9.2      01 Nov 2022 06:25    TPro  7.1  /  Alb  2.4  /  TBili  0.9  /  DBili  x   /  AST  27  /  ALT  51  /  AlkPhos  92  11-01          COVID-19 PCR: NotDetec (10-31-22 @ 21:00)      RADIOLOGY & ADDITIONAL TESTS:    Care Discussed with Consultants/Other Providers: rehab team during IDR round
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
## 2022-11-03 LAB
ALBUMIN SERPL ELPH-MCNC: 2.4 G/DL — LOW (ref 3.3–5)
ALP SERPL-CCNC: 91 U/L — SIGNIFICANT CHANGE UP (ref 40–120)
ALT FLD-CCNC: 43 U/L — SIGNIFICANT CHANGE UP (ref 10–45)
ANION GAP SERPL CALC-SCNC: 6 MMOL/L — SIGNIFICANT CHANGE UP (ref 5–17)
AST SERPL-CCNC: 24 U/L — SIGNIFICANT CHANGE UP (ref 10–40)
BILIRUB SERPL-MCNC: 0.8 MG/DL — SIGNIFICANT CHANGE UP (ref 0.2–1.2)
BUN SERPL-MCNC: 17 MG/DL — SIGNIFICANT CHANGE UP (ref 7–23)
CALCIUM SERPL-MCNC: 9.4 MG/DL — SIGNIFICANT CHANGE UP (ref 8.4–10.5)
CHLORIDE SERPL-SCNC: 105 MMOL/L — SIGNIFICANT CHANGE UP (ref 96–108)
CO2 SERPL-SCNC: 27 MMOL/L — SIGNIFICANT CHANGE UP (ref 22–31)
CREAT SERPL-MCNC: 0.8 MG/DL — SIGNIFICANT CHANGE UP (ref 0.5–1.3)
EGFR: 106 ML/MIN/1.73M2 — SIGNIFICANT CHANGE UP
GLUCOSE SERPL-MCNC: 101 MG/DL — HIGH (ref 70–99)
HCT VFR BLD CALC: 34.8 % — LOW (ref 39–50)
HGB BLD-MCNC: 11.2 G/DL — LOW (ref 13–17)
MCHC RBC-ENTMCNC: 30.9 PG — SIGNIFICANT CHANGE UP (ref 27–34)
MCHC RBC-ENTMCNC: 32.2 GM/DL — SIGNIFICANT CHANGE UP (ref 32–36)
MCV RBC AUTO: 96.1 FL — SIGNIFICANT CHANGE UP (ref 80–100)
NRBC # BLD: 0 /100 WBCS — SIGNIFICANT CHANGE UP (ref 0–0)
PLATELET # BLD AUTO: 431 K/UL — HIGH (ref 150–400)
POTASSIUM SERPL-MCNC: 4.6 MMOL/L — SIGNIFICANT CHANGE UP (ref 3.5–5.3)
POTASSIUM SERPL-SCNC: 4.6 MMOL/L — SIGNIFICANT CHANGE UP (ref 3.5–5.3)
PROT SERPL-MCNC: 7.1 G/DL — SIGNIFICANT CHANGE UP (ref 6–8.3)
RBC # BLD: 3.62 M/UL — LOW (ref 4.2–5.8)
RBC # FLD: 12.9 % — SIGNIFICANT CHANGE UP (ref 10.3–14.5)
SODIUM SERPL-SCNC: 138 MMOL/L — SIGNIFICANT CHANGE UP (ref 135–145)
WBC # BLD: 6.52 K/UL — SIGNIFICANT CHANGE UP (ref 3.8–10.5)
WBC # FLD AUTO: 6.52 K/UL — SIGNIFICANT CHANGE UP (ref 3.8–10.5)

## 2022-11-03 PROCEDURE — 99232 SBSQ HOSP IP/OBS MODERATE 35: CPT

## 2022-11-03 RX ORDER — ALPRAZOLAM 0.25 MG
0.25 TABLET ORAL
Refills: 0 | Status: DISCONTINUED | OUTPATIENT
Start: 2022-11-03 | End: 2022-11-09

## 2022-11-03 RX ORDER — ALBUTEROL 90 UG/1
2 AEROSOL, METERED ORAL ONCE
Refills: 0 | Status: COMPLETED | OUTPATIENT
Start: 2022-11-03 | End: 2022-11-03

## 2022-11-03 RX ORDER — ALPRAZOLAM 0.25 MG
0.25 TABLET ORAL ONCE
Refills: 0 | Status: DISCONTINUED | OUTPATIENT
Start: 2022-11-03 | End: 2022-11-03

## 2022-11-03 RX ADMIN — HEPARIN SODIUM 5000 UNIT(S): 5000 INJECTION INTRAVENOUS; SUBCUTANEOUS at 06:47

## 2022-11-03 RX ADMIN — CHLORHEXIDINE GLUCONATE 1 APPLICATION(S): 213 SOLUTION TOPICAL at 12:14

## 2022-11-03 RX ADMIN — NAFCILLIN 200 GRAM(S): 10 INJECTION, POWDER, FOR SOLUTION INTRAVENOUS at 21:09

## 2022-11-03 RX ADMIN — HEPARIN SODIUM 5000 UNIT(S): 5000 INJECTION INTRAVENOUS; SUBCUTANEOUS at 21:29

## 2022-11-03 RX ADMIN — NAFCILLIN 200 GRAM(S): 10 INJECTION, POWDER, FOR SOLUTION INTRAVENOUS at 17:35

## 2022-11-03 RX ADMIN — OXYCODONE HYDROCHLORIDE 20 MILLIGRAM(S): 5 TABLET ORAL at 16:58

## 2022-11-03 RX ADMIN — GABAPENTIN 300 MILLIGRAM(S): 400 CAPSULE ORAL at 13:38

## 2022-11-03 RX ADMIN — Medication 1 TABLET(S): at 12:14

## 2022-11-03 RX ADMIN — HEPARIN SODIUM 5000 UNIT(S): 5000 INJECTION INTRAVENOUS; SUBCUTANEOUS at 13:41

## 2022-11-03 RX ADMIN — GABAPENTIN 300 MILLIGRAM(S): 400 CAPSULE ORAL at 06:47

## 2022-11-03 RX ADMIN — Medication 0.25 MILLIGRAM(S): at 01:52

## 2022-11-03 RX ADMIN — Medication 0.25 MILLIGRAM(S): at 20:17

## 2022-11-03 RX ADMIN — Medication 10 MILLIGRAM(S): at 16:27

## 2022-11-03 RX ADMIN — OXYCODONE HYDROCHLORIDE 20 MILLIGRAM(S): 5 TABLET ORAL at 08:01

## 2022-11-03 RX ADMIN — OXYCODONE HYDROCHLORIDE 20 MILLIGRAM(S): 5 TABLET ORAL at 16:28

## 2022-11-03 RX ADMIN — LISINOPRIL 40 MILLIGRAM(S): 2.5 TABLET ORAL at 06:46

## 2022-11-03 RX ADMIN — NAFCILLIN 200 GRAM(S): 10 INJECTION, POWDER, FOR SOLUTION INTRAVENOUS at 13:41

## 2022-11-03 RX ADMIN — NAFCILLIN 200 GRAM(S): 10 INJECTION, POWDER, FOR SOLUTION INTRAVENOUS at 06:09

## 2022-11-03 RX ADMIN — OXYCODONE HYDROCHLORIDE 20 MILLIGRAM(S): 5 TABLET ORAL at 20:17

## 2022-11-03 RX ADMIN — NAFCILLIN 200 GRAM(S): 10 INJECTION, POWDER, FOR SOLUTION INTRAVENOUS at 01:33

## 2022-11-03 RX ADMIN — OXYCODONE HYDROCHLORIDE 20 MILLIGRAM(S): 5 TABLET ORAL at 12:44

## 2022-11-03 RX ADMIN — OXYCODONE HYDROCHLORIDE 20 MILLIGRAM(S): 5 TABLET ORAL at 04:00

## 2022-11-03 RX ADMIN — OXYCODONE HYDROCHLORIDE 20 MILLIGRAM(S): 5 TABLET ORAL at 20:36

## 2022-11-03 RX ADMIN — NAFCILLIN 200 GRAM(S): 10 INJECTION, POWDER, FOR SOLUTION INTRAVENOUS at 10:32

## 2022-11-03 RX ADMIN — OXYCODONE HYDROCHLORIDE 20 MILLIGRAM(S): 5 TABLET ORAL at 12:14

## 2022-11-03 RX ADMIN — FAMOTIDINE 20 MILLIGRAM(S): 10 INJECTION INTRAVENOUS at 12:14

## 2022-11-03 RX ADMIN — OXYCODONE HYDROCHLORIDE 20 MILLIGRAM(S): 5 TABLET ORAL at 21:47

## 2022-11-03 RX ADMIN — OXYCODONE HYDROCHLORIDE 20 MILLIGRAM(S): 5 TABLET ORAL at 03:11

## 2022-11-03 RX ADMIN — Medication 0.25 MILLIGRAM(S): at 12:46

## 2022-11-03 RX ADMIN — OXYCODONE HYDROCHLORIDE 20 MILLIGRAM(S): 5 TABLET ORAL at 08:31

## 2022-11-03 RX ADMIN — ALBUTEROL 2 PUFF(S): 90 AEROSOL, METERED ORAL at 00:00

## 2022-11-03 RX ADMIN — OXYCODONE HYDROCHLORIDE 20 MILLIGRAM(S): 5 TABLET ORAL at 07:26

## 2022-11-03 RX ADMIN — Medication 10 MILLIGRAM(S): at 06:49

## 2022-11-03 RX ADMIN — GABAPENTIN 300 MILLIGRAM(S): 400 CAPSULE ORAL at 21:29

## 2022-11-03 NOTE — PROGRESS NOTE ADULT - SUBJECTIVE AND OBJECTIVE BOX
Patient is a 52y old  Male who presents with a chief complaint of Status post arthrodesis    HPI:  This is a 53 YO male with PMH of lumbar Spinal Stenosis, Kidney Stone, and chronic back pain due to sports injuries who presented to [facility] on 10/14 with c/o Right Sided Shoulder Pain and acute on chronic Lower back pain. Pt seen by neurosurgery no acute intervention outpt FU. Shoulder XR negative. Shoulder Mri with Mildly displaced anterior glenoid labrum extending from the three to 4:00 position with evidence of an ALPSA lesion. Soft tissue swelling and edema seen in the region of the distal clavicle involving the trapezius musculature. Per Ortho, no acute orthopedic surgical intervention warranted. S/p Right shoulder subacromial injection 10/17/2022. Hospital course significant for RRT on 10/18 due to rigors and severe pain, Temp 102.8.RRT on 10/20 for CP and SOB. Bacteremia Blood cx 's + GPC MSSA   L2- S1 laminectomy and evacuation of epidural abscess and washout on 10/22. Seen by ID, nafcillin 2 q 4hrs till 12/3 with weekly sed rate, crp, cmp and cbc. PICC line inserted on 10/30  KD negative for vegetation      TODAY'S SUBJECTIVE & REVIEW OF SYMPTOMS:  Patient seen in PT gym this am   Overnight concerns of tremors - patient thinks it is seizures   Also had chest tightness and wheeze and was given nebuliser treatment and also xanax.   This am when seen on rounds he states his concerns about over night events and also reports occipital region right sided pain and states that he has a history of post concussion syndrome  He states that he has been prescribed valium in the past and that could have masked his seizures and now that he is off medications concerned that seizures would return   Seen by NP yesterday for anxiety       ROS:  Denies HA/CP/palpitations  Denies abdominal pain or nausea  GI- + BM 11/2  Denies any dysuria or urinary incontinence       PHYSICAL EXAM  Vital Signs Last 24 Hrs  T(C): 36.3 (03 Nov 2022 08:45), Max: 37.1 (02 Nov 2022 20:18)  T(F): 97.4 (03 Nov 2022 08:45), Max: 98.8 (02 Nov 2022 20:18)  HR: 74 (03 Nov 2022 08:45) (68 - 90)  BP: 140/96 (03 Nov 2022 08:45) (133/85 - 158/86)  BP(mean): --  RR: 18 (03 Nov 2022 08:45) (16 - 18)  SpO2: 97% (03 Nov 2022 08:45) (96% - 98%)    Parameters below as of 03 Nov 2022 08:45  Patient On (Oxygen Delivery Method): room air    Constitutional - NAD, Comfortable  Chest - breathing comfortably   Cardiovascular - well perfused   Abdomen - BS+, Soft, NTND  Extremities - No C/C/E, No calf tenderness   Motor antigravity strength in al extremities  Back - incision with staples   Psychiatric - Mood anxious   Aphysiologic responses to motor testing tasks, pressured speech at times , appears irritable at times     Function:  min assist with all ADLs  Min to mod assist - varied in PT session   Gait 150' with RW min assist       MEDICATIONS  (STANDING):  chlorhexidine 2% Cloths 1 Application(s) Topical daily  famotidine    Tablet 20 milliGRAM(s) Oral daily  gabapentin 300 milliGRAM(s) Oral three times a day  heparin   Injectable 5000 Unit(s) SubCutaneous every 8 hours  lisinopril 40 milliGRAM(s) Oral daily  multivitamin 1 Tablet(s) Oral daily  nafcillin  IVPB 2 Gram(s) IV Intermittent every 4 hours  oxyCODONE  ER Tablet 20 milliGRAM(s) Oral <User Schedule>  polyethylene glycol 3350 17 Gram(s) Oral daily  senna 2 Tablet(s) Oral at bedtime    MEDICATIONS  (PRN):  acetaminophen     Tablet .. 650 milliGRAM(s) Oral every 6 hours PRN Temp greater or equal to 38C (100.4F), Mild Pain (1 - 3)  ALPRAZolam 0.25 milliGRAM(s) Oral two times a day PRN anxiety  baclofen 10 milliGRAM(s) Oral every 8 hours PRN Muscle Spasm  oxyCODONE    IR 20 milliGRAM(s) Oral every 4 hours PRN Severe Pain (7 - 10)  simethicone 80 milliGRAM(s) Chew four times a day PRN Heartburn                            11.2   6.52  )-----------( 431      ( 03 Nov 2022 06:32 )             34.8     11-03    138  |  105  |  17  ----------------------------<  101<H>  4.6   |  27  |  0.80    Ca    9.4      03 Nov 2022 06:32    TPro  7.1  /  Alb  2.4<L>  /  TBili  0.8  /  DBili  x   /  AST  24  /  ALT  43  /  AlkPhos  91  11-03

## 2022-11-03 NOTE — PROGRESS NOTE ADULT - ASSESSMENT
53 YO male with PMH of lumbar Spinal Stenosis, Kidney Stone, and chronic back pain admitted to rehab after recent hospitalisation for increased pain and finding of Bacteremia Blood cx 's + GPC MSSA , L2- S1 laminectomy and evacuation of epidural abscess and washout on 10/22.. s/p Right shoulder subacromial injection 10/17/2022.      Continue  Comprehensive Rehab Program: PT/OT, 3hours daily and 5 days weekly    #Sepsis, Lumbar epidural abscess MSSA bacteremia, - s/p L2-S1 laminectomy and washout on 10/22/22- Further chart review of ID notes from outside hospital also shows bilateral psoas abscess    - Naficillin 2G Q 4hrs till 12/3  - Left  UE PICC line inserted on 10/30  - Weekly ESR, CRP, CMP, and CBC   KD negative for vegetation   Abd/pelvis - no drainable abscess   Blood cultures 10/22 - NG     #HTN:- lisinopril 40mg daily    #Pain management  - Tylenol PRN, Oxycodone, Oxycontin, Neurontin,   baclofen 10mg tid prn spasms   May use TENS to right SI region prn     Mood: anxiety about current medical course. Neuropsych consult  noted . Xanax 0.25mg bid prn      #DVT ppx  - Heparin, SCD, TEDs    #GI ppx  - Pepcid 20mg     #Bowel Regimen:- Senna, miralax     #Bladder management: continent . Will dc bladder scans     #FEN   - Diet: Regular    #Skin:  - Skin on admission: Lumbar spine incision with staples KATARZYNA  - Pressure injury/Skin: Turn Q2hrs while in bed, OOB to Chair, PT/OT     #Sleep: meds as needed     #Precaution: - Fall, Spinal  LSO when OOB with therapy  and activity ( Per ortho )     Disp:  IDR 11/3:  Anxiety impacting therapy   Patient will need assistance for IV abx   TDD 11/15/22  based on progress and assistance for IV abx

## 2022-11-03 NOTE — CHART NOTE - NSCHARTNOTEFT_GEN_A_CORE
Nutrition Follow Up Note  Hospital Course   (Per Electronic Medical Record)    Source:  Patient [X]  Medical Record [X]      Diet:   Regular Diet (IDDSI Level 7) w/ Thin Liquids (IDDSI Level 0)  Tolerates Diet Consistency Well  No Chewing/Swallowing Difficulties  No Recent Vomiting, Diarrhea or Constipation & Some Recent Nausea,  (as Per Patient)  Consumes % of Meals (as Per Documentation) - Good PO Intake/Appetite (Per Patient)   on Ensure Plant Based 8oz PO BID (Provides 360kcal & 40grams of Protein)  Patient Takes Nutrition Supplement Well    Enteral/Parenteral Nutrition: Not Applicable    Current Weight: 194lb on 10/31  Obtain New Weight  Obtain Weights Weekly     Pertinent Medications: MEDICATIONS  (STANDING):  chlorhexidine 2% Cloths 1 Application(s) Topical daily  famotidine    Tablet 20 milliGRAM(s) Oral daily  gabapentin 300 milliGRAM(s) Oral three times a day  heparin   Injectable 5000 Unit(s) SubCutaneous every 8 hours  lisinopril 40 milliGRAM(s) Oral daily  multivitamin 1 Tablet(s) Oral daily  nafcillin  IVPB 2 Gram(s) IV Intermittent every 4 hours  oxyCODONE  ER Tablet 20 milliGRAM(s) Oral <User Schedule>  polyethylene glycol 3350 17 Gram(s) Oral daily  senna 2 Tablet(s) Oral at bedtime    MEDICATIONS  (PRN):  acetaminophen     Tablet .. 650 milliGRAM(s) Oral every 6 hours PRN Temp greater or equal to 38C (100.4F), Mild Pain (1 - 3)  baclofen 10 milliGRAM(s) Oral every 8 hours PRN Muscle Spasm  oxyCODONE    IR 20 milliGRAM(s) Oral every 4 hours PRN Severe Pain (7 - 10)  simethicone 80 milliGRAM(s) Chew four times a day PRN Heartburn    Pertinent Labs:  11-03 Na138 mmol/L Glu 101 mg/dL<H> K+ 4.6 mmol/L Cr  0.80 mg/dL BUN 17 mg/dL 11-03 Alb 2.4 g/dL<L>    Skin: No Pressure Ulcers  Surgical Incision on C-Spine  (as Per Nursing Flow Sheet)     Edema: None Noted (as Per Documentation)     Last Bowel Movement: on 11/2    Estimated Needs:   [X] No Change Since Previous Assessment    Previous Nutrition Diagnosis:   Severe Malnutrition     Nutrition Diagnosis is [X] Ongoing - Continues on Nutrition Supplement & Patient Takes Nutrition Supplement     New Nutrition Diagnosis: [X] Not Applicable    Interventions:   1. Recommend Continue Nutrition Plan of Care     Monitoring & Evaluation:   [X] Weights   [X] PO Intake   [X] Skin Integrity   [X] Follow Up (Per Protocol)  [X] Tolerance to Diet Prescription   [X] Other: Labs     Registered Dietitian/Nutritionist Remains Available.  Kali Ortega RDN    Phone# [phone]

## 2022-11-04 PROCEDURE — 93010 ELECTROCARDIOGRAM REPORT: CPT

## 2022-11-04 PROCEDURE — 71045 X-RAY EXAM CHEST 1 VIEW: CPT | Mod: 26

## 2022-11-04 PROCEDURE — 99232 SBSQ HOSP IP/OBS MODERATE 35: CPT

## 2022-11-04 PROCEDURE — 99233 SBSQ HOSP IP/OBS HIGH 50: CPT

## 2022-11-04 RX ORDER — BACLOFEN 100 %
5 POWDER (GRAM) MISCELLANEOUS EVERY 8 HOURS
Refills: 0 | Status: DISCONTINUED | OUTPATIENT
Start: 2022-11-04 | End: 2022-11-04

## 2022-11-04 RX ORDER — SACCHAROMYCES BOULARDII 250 MG
250 POWDER IN PACKET (EA) ORAL
Refills: 0 | Status: DISCONTINUED | OUTPATIENT
Start: 2022-11-04 | End: 2022-11-17

## 2022-11-04 RX ORDER — NIFEDIPINE 30 MG
30 TABLET, EXTENDED RELEASE 24 HR ORAL DAILY
Refills: 0 | Status: DISCONTINUED | OUTPATIENT
Start: 2022-11-04 | End: 2022-11-04

## 2022-11-04 RX ORDER — BACLOFEN 100 %
10 POWDER (GRAM) MISCELLANEOUS EVERY 8 HOURS
Refills: 0 | Status: DISCONTINUED | OUTPATIENT
Start: 2022-11-04 | End: 2022-11-17

## 2022-11-04 RX ADMIN — OXYCODONE HYDROCHLORIDE 20 MILLIGRAM(S): 5 TABLET ORAL at 21:11

## 2022-11-04 RX ADMIN — LISINOPRIL 40 MILLIGRAM(S): 2.5 TABLET ORAL at 05:22

## 2022-11-04 RX ADMIN — OXYCODONE HYDROCHLORIDE 20 MILLIGRAM(S): 5 TABLET ORAL at 11:45

## 2022-11-04 RX ADMIN — HEPARIN SODIUM 5000 UNIT(S): 5000 INJECTION INTRAVENOUS; SUBCUTANEOUS at 05:23

## 2022-11-04 RX ADMIN — Medication 30 MILLIGRAM(S): at 09:48

## 2022-11-04 RX ADMIN — NAFCILLIN 200 GRAM(S): 10 INJECTION, POWDER, FOR SOLUTION INTRAVENOUS at 21:57

## 2022-11-04 RX ADMIN — Medication 1 TABLET(S): at 12:18

## 2022-11-04 RX ADMIN — Medication 650 MILLIGRAM(S): at 21:58

## 2022-11-04 RX ADMIN — FAMOTIDINE 20 MILLIGRAM(S): 10 INJECTION INTRAVENOUS at 12:18

## 2022-11-04 RX ADMIN — Medication 5 MILLIGRAM(S): at 14:56

## 2022-11-04 RX ADMIN — HEPARIN SODIUM 5000 UNIT(S): 5000 INJECTION INTRAVENOUS; SUBCUTANEOUS at 14:56

## 2022-11-04 RX ADMIN — OXYCODONE HYDROCHLORIDE 20 MILLIGRAM(S): 5 TABLET ORAL at 09:06

## 2022-11-04 RX ADMIN — OXYCODONE HYDROCHLORIDE 20 MILLIGRAM(S): 5 TABLET ORAL at 05:22

## 2022-11-04 RX ADMIN — Medication 650 MILLIGRAM(S): at 08:44

## 2022-11-04 RX ADMIN — OXYCODONE HYDROCHLORIDE 20 MILLIGRAM(S): 5 TABLET ORAL at 08:44

## 2022-11-04 RX ADMIN — OXYCODONE HYDROCHLORIDE 20 MILLIGRAM(S): 5 TABLET ORAL at 20:26

## 2022-11-04 RX ADMIN — OXYCODONE HYDROCHLORIDE 20 MILLIGRAM(S): 5 TABLET ORAL at 21:58

## 2022-11-04 RX ADMIN — OXYCODONE HYDROCHLORIDE 20 MILLIGRAM(S): 5 TABLET ORAL at 18:05

## 2022-11-04 RX ADMIN — SENNA PLUS 2 TABLET(S): 8.6 TABLET ORAL at 21:59

## 2022-11-04 RX ADMIN — GABAPENTIN 300 MILLIGRAM(S): 400 CAPSULE ORAL at 14:58

## 2022-11-04 RX ADMIN — Medication 10 MILLIGRAM(S): at 22:00

## 2022-11-04 RX ADMIN — GABAPENTIN 300 MILLIGRAM(S): 400 CAPSULE ORAL at 05:22

## 2022-11-04 RX ADMIN — OXYCODONE HYDROCHLORIDE 20 MILLIGRAM(S): 5 TABLET ORAL at 01:08

## 2022-11-04 RX ADMIN — NAFCILLIN 200 GRAM(S): 10 INJECTION, POWDER, FOR SOLUTION INTRAVENOUS at 05:23

## 2022-11-04 RX ADMIN — OXYCODONE HYDROCHLORIDE 20 MILLIGRAM(S): 5 TABLET ORAL at 02:00

## 2022-11-04 RX ADMIN — OXYCODONE HYDROCHLORIDE 20 MILLIGRAM(S): 5 TABLET ORAL at 22:22

## 2022-11-04 RX ADMIN — NAFCILLIN 200 GRAM(S): 10 INJECTION, POWDER, FOR SOLUTION INTRAVENOUS at 10:44

## 2022-11-04 RX ADMIN — Medication 10 MILLIGRAM(S): at 00:29

## 2022-11-04 RX ADMIN — NAFCILLIN 200 GRAM(S): 10 INJECTION, POWDER, FOR SOLUTION INTRAVENOUS at 01:09

## 2022-11-04 RX ADMIN — Medication 650 MILLIGRAM(S): at 09:06

## 2022-11-04 RX ADMIN — NAFCILLIN 200 GRAM(S): 10 INJECTION, POWDER, FOR SOLUTION INTRAVENOUS at 14:55

## 2022-11-04 RX ADMIN — NAFCILLIN 200 GRAM(S): 10 INJECTION, POWDER, FOR SOLUTION INTRAVENOUS at 18:02

## 2022-11-04 RX ADMIN — CHLORHEXIDINE GLUCONATE 1 APPLICATION(S): 213 SOLUTION TOPICAL at 12:19

## 2022-11-04 RX ADMIN — GABAPENTIN 300 MILLIGRAM(S): 400 CAPSULE ORAL at 22:00

## 2022-11-04 RX ADMIN — Medication 650 MILLIGRAM(S): at 22:22

## 2022-11-04 RX ADMIN — OXYCODONE HYDROCHLORIDE 20 MILLIGRAM(S): 5 TABLET ORAL at 18:45

## 2022-11-04 RX ADMIN — OXYCODONE HYDROCHLORIDE 20 MILLIGRAM(S): 5 TABLET ORAL at 11:02

## 2022-11-04 RX ADMIN — OXYCODONE HYDROCHLORIDE 20 MILLIGRAM(S): 5 TABLET ORAL at 07:05

## 2022-11-04 RX ADMIN — HEPARIN SODIUM 5000 UNIT(S): 5000 INJECTION INTRAVENOUS; SUBCUTANEOUS at 21:57

## 2022-11-04 RX ADMIN — Medication 0.25 MILLIGRAM(S): at 07:07

## 2022-11-04 NOTE — PROGRESS NOTE ADULT - SUBJECTIVE AND OBJECTIVE BOX
Patient is a 52y old  Male who presents with a chief complaint of Lumbar Laminectomy (04 Nov 2022 12:59)      Subjective and overnight events:  Patient seen and examined at bedside. reports having intermittent palpitations today and mild headache. + muscle spasms.   no fever, chills, cough, sob, cp, abd pain.    ALLERGIES:  No Known Allergies    MEDICATIONS  (STANDING):  baclofen 5 milliGRAM(s) Oral every 8 hours  chlorhexidine 2% Cloths 1 Application(s) Topical daily  famotidine    Tablet 20 milliGRAM(s) Oral daily  gabapentin 300 milliGRAM(s) Oral three times a day  heparin   Injectable 5000 Unit(s) SubCutaneous every 8 hours  lisinopril 40 milliGRAM(s) Oral daily  multivitamin 1 Tablet(s) Oral daily  nafcillin  IVPB 2 Gram(s) IV Intermittent every 4 hours  oxyCODONE  ER Tablet 20 milliGRAM(s) Oral <User Schedule>  polyethylene glycol 3350 17 Gram(s) Oral daily  senna 2 Tablet(s) Oral at bedtime    MEDICATIONS  (PRN):  acetaminophen     Tablet .. 650 milliGRAM(s) Oral every 6 hours PRN Temp greater or equal to 38C (100.4F), Mild Pain (1 - 3)  ALPRAZolam 0.25 milliGRAM(s) Oral two times a day PRN anxiety  oxyCODONE    IR 20 milliGRAM(s) Oral every 4 hours PRN Severe Pain (7 - 10)  simethicone 80 milliGRAM(s) Chew four times a day PRN Heartburn    Vital Signs Last 24 Hrs  T(F): 98.2 (04 Nov 2022 09:50), Max: 98.2 (04 Nov 2022 07:55)  HR: 89 (04 Nov 2022 09:50) (70 - 90)  BP: 125/83 (04 Nov 2022 09:50) (125/83 - 151/94)  RR: 17 (04 Nov 2022 09:50) (17 - 18)  SpO2: 96% (04 Nov 2022 09:50) (96% - 96%)  I&O's Summary    03 Nov 2022 07:01  -  04 Nov 2022 07:00  --------------------------------------------------------  IN: 100 mL / OUT: 1000 mL / NET: -900 mL      PHYSICAL EXAM:  General: A/O x 3. pressure speech, fidgety and anixous  ENT: MMM  Neck: Supple, No JVD  Lungs: Clear to auscultation bilaterally  Cardio: RRR, S1/S2, No murmurs  Abdomen: Soft, Nontender, Nondistended; Bowel sounds present  Extremities: No calf tenderness, No pitting edema    LABS:                        11.2   6.52  )-----------( 431      ( 03 Nov 2022 06:32 )             34.8     11-03    138  |  105  |  17  ----------------------------<  101  4.6   |  27  |  0.80    Ca    9.4      03 Nov 2022 06:32    TPro  7.1  /  Alb  2.4  /  TBili  0.8  /  DBili  x   /  AST  24  /  ALT  43  /  AlkPhos  91  11-03        COVID-19 PCR: NotDetec (10-31-22 @ 21:00)      RADIOLOGY & ADDITIONAL TESTS:  < from: Xray Chest 1 View- PORTABLE-Urgent (Xray Chest 1 View- PORTABLE-Urgent .) (11.04.22 @ 09:27) >  Film obtained concentrating on the central left chest.    Elevated left hemidiaphragm with adjacent atelectasis again noted.    Left PICC line is again seen. Tip ends in the mid superior vena cava.    Findings are similar to November 2.    IMPRESSION: As above.    --- End of Report ---    < end of copied text >      Care Discussed with Consultants/Other Providers: d/w rehab team, baclofen changed to standing q8

## 2022-11-04 NOTE — PROGRESS NOTE ADULT - SUBJECTIVE AND OBJECTIVE BOX
Patient is a 52y old  Male who presents with a chief complaint of epidural abscess and lumbar lami         TODAY'S SUBJECTIVE & REVIEW OF SYMPTOMS:  Patient seen at bedside this am   Overnight concerns of tremors in  hands and feet - still feels like it is seizures  Also reported palpitations - EKG ordered by hospitalist. and also CXR done   Patient happy about his PT session as he did well      ROS:  Denies HA/CP/palpitations  Denies abdominal pain or nausea  GI- + BM 11/3- reports loose stool  Denies any dysuria or urinary incontinence       PHYSICAL EXAM  Vital Signs Last 24 Hrs  T(C): 36.8 (04 Nov 2022 09:50), Max: 36.8 (04 Nov 2022 07:55)  T(F): 98.2 (04 Nov 2022 09:50), Max: 98.2 (04 Nov 2022 07:55)  HR: 89 (04 Nov 2022 09:50) (70 - 90)  BP: 125/83 (04 Nov 2022 09:50) (125/83 - 151/94)  BP(mean): --  RR: 17 (04 Nov 2022 09:50) (17 - 18)  SpO2: 96% (04 Nov 2022 09:50) (96% - 96%)    Parameters below as of 04 Nov 2022 09:50  Patient On (Oxygen Delivery Method): room air        Constitutional - NAD, Comfortable  Chest - breathing comfortably   Cardiovascular - well perfused   Abdomen - BS+, Soft, NTND  Extremities - No C/C/E, No calf tenderness   Motor antigravity strength in al extremities  Back - incision with staples   Psychiatric - Mood anxious   Aphysiologic responses to motor testing tasks, pressured speech at times , appears irritable at times     Function:  min assist with all ADLs  Min to mod assist - varied in PT session   Gait with RW min assist       MEDICATIONS  (STANDING):  baclofen 5 milliGRAM(s) Oral every 8 hours  chlorhexidine 2% Cloths 1 Application(s) Topical daily  famotidine    Tablet 20 milliGRAM(s) Oral daily  gabapentin 300 milliGRAM(s) Oral three times a day  heparin   Injectable 5000 Unit(s) SubCutaneous every 8 hours  lisinopril 40 milliGRAM(s) Oral daily  multivitamin 1 Tablet(s) Oral daily  nafcillin  IVPB 2 Gram(s) IV Intermittent every 4 hours  oxyCODONE  ER Tablet 20 milliGRAM(s) Oral <User Schedule>  polyethylene glycol 3350 17 Gram(s) Oral daily  senna 2 Tablet(s) Oral at bedtime    MEDICATIONS  (PRN):  acetaminophen     Tablet .. 650 milliGRAM(s) Oral every 6 hours PRN Temp greater or equal to 38C (100.4F), Mild Pain (1 - 3)  ALPRAZolam 0.25 milliGRAM(s) Oral two times a day PRN anxiety  oxyCODONE    IR 20 milliGRAM(s) Oral every 4 hours PRN Severe Pain (7 - 10)  simethicone 80 milliGRAM(s) Chew four times a day PRN Heartburn                            11.2   6.52  )-----------( 431      ( 03 Nov 2022 06:32 )             34.8     11-03    138  |  105  |  17  ----------------------------<  101<H>  4.6   |  27  |  0.80    Ca    9.4      03 Nov 2022 06:32    TPro  7.1  /  Alb  2.4<L>  /  TBili  0.8  /  DBili  x   /  AST  24  /  ALT  43  /  AlkPhos  91  11-03

## 2022-11-04 NOTE — PROGRESS NOTE ADULT - SUBJECTIVE AND OBJECTIVE BOX
Patient is a 52y old  Male who presents with a chief complaint of Epidural abscess   Lumbar Laminectomy and evacuation of abscess (04 Nov 2022 11:59)      Subjective and overnight events:  Patient seen and examined at bedside.    ALLERGIES:  No Known Allergies    MEDICATIONS  (STANDING):  baclofen 5 milliGRAM(s) Oral every 8 hours  chlorhexidine 2% Cloths 1 Application(s) Topical daily  famotidine    Tablet 20 milliGRAM(s) Oral daily  gabapentin 300 milliGRAM(s) Oral three times a day  heparin   Injectable 5000 Unit(s) SubCutaneous every 8 hours  lisinopril 40 milliGRAM(s) Oral daily  multivitamin 1 Tablet(s) Oral daily  nafcillin  IVPB 2 Gram(s) IV Intermittent every 4 hours  oxyCODONE  ER Tablet 20 milliGRAM(s) Oral <User Schedule>  polyethylene glycol 3350 17 Gram(s) Oral daily  senna 2 Tablet(s) Oral at bedtime    MEDICATIONS  (PRN):  acetaminophen     Tablet .. 650 milliGRAM(s) Oral every 6 hours PRN Temp greater or equal to 38C (100.4F), Mild Pain (1 - 3)  ALPRAZolam 0.25 milliGRAM(s) Oral two times a day PRN anxiety  oxyCODONE    IR 20 milliGRAM(s) Oral every 4 hours PRN Severe Pain (7 - 10)  simethicone 80 milliGRAM(s) Chew four times a day PRN Heartburn    Vital Signs Last 24 Hrs  T(F): 98.2 (04 Nov 2022 09:50), Max: 98.2 (04 Nov 2022 07:55)  HR: 89 (04 Nov 2022 09:50) (70 - 90)  BP: 125/83 (04 Nov 2022 09:50) (125/83 - 151/94)  RR: 17 (04 Nov 2022 09:50) (17 - 18)  SpO2: 96% (04 Nov 2022 09:50) (96% - 96%)  I&O's Summary    03 Nov 2022 07:01  -  04 Nov 2022 07:00  --------------------------------------------------------  IN: 100 mL / OUT: 1000 mL / NET: -900 mL      PHYSICAL EXAM:  General: NAD, A/O x 3  ENT: MMM  Neck: Supple, No JVD  Lungs: Clear to auscultation bilaterally  Cardio: RRR, S1/S2, No murmurs  Abdomen: Soft, Nontender, Nondistended; Bowel sounds present  Extremities: No calf tenderness, No pitting edema    LABS:                        11.2   6.52  )-----------( 431      ( 03 Nov 2022 06:32 )             34.8     11-03    138  |  105  |  17  ----------------------------<  101  4.6   |  27  |  0.80    Ca    9.4      03 Nov 2022 06:32    TPro  7.1  /  Alb  2.4  /  TBili  0.8  /  DBili  x   /  AST  24  /  ALT  43  /  AlkPhos  91  11-03                                    COVID-19 PCR: NotDetec (10-31-22 @ 21:00)      RADIOLOGY & ADDITIONAL TESTS:    Care Discussed with Consultants/Other Providers:

## 2022-11-04 NOTE — PROGRESS NOTE ADULT - ASSESSMENT
ASSESSMENT/PLAN  This is a 53 YO male with PMH of lumbar Spinal Stenosis, Kidney Stone, and chronic back pain due to sports injuries who presented to [facility] on 10/14 with c/o Right Sided Shoulder Pain and acute on chronic Lower back pain. Shoulder MRI with Mildly displaced anterior glenoid labrum extending from the three to 4:00 position with evidence of an ALPSA lesion. Soft tissue swelling and edema seen in the region of the distal clavicle involving the trapezius musculature. s/p Right shoulder subacromial injection 10/17/2022. Hospital course significant for sepsis, no on long term IV ABX. Patient now with gait Instability, ADL impairments and Functional impairments.    #Mildly displaced anterior glenoid labrum   #Lumbar Spinal Stenosis  - s/p Right shoulder subacromial injection 10/17/2022  - s/p L2-S1 laminectomy and washout on 10/22/22  - pain and bowel regimen as per rehab    #MSSA bacteremia due to epidural abscess and bilateral psoas abscess  - s/p wound washout   - repeat blood cx on 10/22 negative  - Naficillin 2G Q 4hrs till 12/3  - Left DL PICC line inserted on 10/30    #Intermittent palpitations  - CXR reviewed 11/4: elevated L hemidiaphragm w/ adjacent atelectasis. PICC line tip ends in mid SVC  - check EKG  - check TSH    #HTN  - SBP persistently> 150  - added nifedipine 30mg daily  - lisinopril 40mg daily  - control pain    #DVT ppx  - Heparin, SCD, TEDs    #GI ppx  - Pepcid 20mg

## 2022-11-04 NOTE — PROGRESS NOTE ADULT - ASSESSMENT
51 YO male with PMH of lumbar Spinal Stenosis, Kidney Stone, and chronic back pain admitted to rehab after recent hospitalisation for increased pain and finding of Bacteremia Blood cx 's + GPC MSSA , L2- S1 laminectomy and evacuation of epidural abscess and washout on 10/22.. s/p Right shoulder subacromial injection 10/17/2022.      Continue  Comprehensive Rehab Program: PT/OT, 3hours daily and 5 days weekly    #Sepsis, Lumbar epidural abscess MSSA bacteremia, - s/p L2-S1 laminectomy and washout on 10/22/22- Further chart review of ID notes from outside hospital also shows bilateral psoas abscess    - Naficillin 2G Q 4hrs till 12/3  - Left  UE PICC line inserted on 10/30  - Weekly ESR, CRP, CMP, and CBC   KD negative for vegetation   Abd/pelvis - no drainable abscess   Blood cultures 10/22 - NG     #HTN:- lisinopril 40mg daily    #Pain management  - Tylenol PRN, Oxycodone, Oxycontin, Neurontin,   baclofen 10mg tid prn spasms   May use TENS to right SI region prn     Mood: anxiety about current medical course. Neuropsych consult  noted . Xanax 0.25mg bid prn      #DVT ppx  - Heparin, SCD, TEDs    #GI ppx  - Pepcid 20mg     #Bowel Regimen:- Senna, miralax     #Bladder management: continent . Will dc bladder scans     #FEN   - Diet: Regular    #Skin:  - Skin on admission: Lumbar spine incision with staples KATARZYNA  - Pressure injury/Skin: Turn Q2hrs while in bed, OOB to Chair, PT/OT     #Sleep: meds as needed     #Precaution: - Fall, Spinal  LSO when OOB with therapy  and activity ( Per ortho )     Hospitalist fu noted     Disp:  IDR 11/3:  Anxiety impacting therapy     Labs - CBC, CMP, ESR, CRP on Monday   Patient will need assistance for IV abx   TDD 11/15/22  based on progress and assistance for IV abx

## 2022-11-05 LAB — TSH SERPL-MCNC: 3.88 UIU/ML — HIGH (ref 0.36–3.74)

## 2022-11-05 PROCEDURE — 99231 SBSQ HOSP IP/OBS SF/LOW 25: CPT

## 2022-11-05 RX ADMIN — Medication 250 MILLIGRAM(S): at 06:42

## 2022-11-05 RX ADMIN — OXYCODONE HYDROCHLORIDE 20 MILLIGRAM(S): 5 TABLET ORAL at 02:14

## 2022-11-05 RX ADMIN — Medication 0.25 MILLIGRAM(S): at 23:56

## 2022-11-05 RX ADMIN — OXYCODONE HYDROCHLORIDE 20 MILLIGRAM(S): 5 TABLET ORAL at 21:21

## 2022-11-05 RX ADMIN — NAFCILLIN 200 GRAM(S): 10 INJECTION, POWDER, FOR SOLUTION INTRAVENOUS at 14:23

## 2022-11-05 RX ADMIN — OXYCODONE HYDROCHLORIDE 20 MILLIGRAM(S): 5 TABLET ORAL at 20:19

## 2022-11-05 RX ADMIN — NAFCILLIN 200 GRAM(S): 10 INJECTION, POWDER, FOR SOLUTION INTRAVENOUS at 18:00

## 2022-11-05 RX ADMIN — OXYCODONE HYDROCHLORIDE 20 MILLIGRAM(S): 5 TABLET ORAL at 08:31

## 2022-11-05 RX ADMIN — OXYCODONE HYDROCHLORIDE 20 MILLIGRAM(S): 5 TABLET ORAL at 06:44

## 2022-11-05 RX ADMIN — FAMOTIDINE 20 MILLIGRAM(S): 10 INJECTION INTRAVENOUS at 12:19

## 2022-11-05 RX ADMIN — NAFCILLIN 200 GRAM(S): 10 INJECTION, POWDER, FOR SOLUTION INTRAVENOUS at 22:21

## 2022-11-05 RX ADMIN — Medication 250 MILLIGRAM(S): at 18:01

## 2022-11-05 RX ADMIN — HEPARIN SODIUM 5000 UNIT(S): 5000 INJECTION INTRAVENOUS; SUBCUTANEOUS at 22:20

## 2022-11-05 RX ADMIN — Medication 10 MILLIGRAM(S): at 22:20

## 2022-11-05 RX ADMIN — Medication 0.25 MILLIGRAM(S): at 10:01

## 2022-11-05 RX ADMIN — NAFCILLIN 200 GRAM(S): 10 INJECTION, POWDER, FOR SOLUTION INTRAVENOUS at 06:27

## 2022-11-05 RX ADMIN — GABAPENTIN 300 MILLIGRAM(S): 400 CAPSULE ORAL at 22:20

## 2022-11-05 RX ADMIN — OXYCODONE HYDROCHLORIDE 20 MILLIGRAM(S): 5 TABLET ORAL at 12:21

## 2022-11-05 RX ADMIN — HEPARIN SODIUM 5000 UNIT(S): 5000 INJECTION INTRAVENOUS; SUBCUTANEOUS at 14:24

## 2022-11-05 RX ADMIN — LISINOPRIL 40 MILLIGRAM(S): 2.5 TABLET ORAL at 06:45

## 2022-11-05 RX ADMIN — OXYCODONE HYDROCHLORIDE 20 MILLIGRAM(S): 5 TABLET ORAL at 08:37

## 2022-11-05 RX ADMIN — GABAPENTIN 300 MILLIGRAM(S): 400 CAPSULE ORAL at 06:42

## 2022-11-05 RX ADMIN — OXYCODONE HYDROCHLORIDE 20 MILLIGRAM(S): 5 TABLET ORAL at 07:20

## 2022-11-05 RX ADMIN — HEPARIN SODIUM 5000 UNIT(S): 5000 INJECTION INTRAVENOUS; SUBCUTANEOUS at 06:42

## 2022-11-05 RX ADMIN — OXYCODONE HYDROCHLORIDE 20 MILLIGRAM(S): 5 TABLET ORAL at 03:11

## 2022-11-05 RX ADMIN — OXYCODONE HYDROCHLORIDE 20 MILLIGRAM(S): 5 TABLET ORAL at 23:56

## 2022-11-05 RX ADMIN — Medication 1 TABLET(S): at 12:19

## 2022-11-05 RX ADMIN — NAFCILLIN 200 GRAM(S): 10 INJECTION, POWDER, FOR SOLUTION INTRAVENOUS at 01:57

## 2022-11-05 RX ADMIN — GABAPENTIN 300 MILLIGRAM(S): 400 CAPSULE ORAL at 14:26

## 2022-11-05 RX ADMIN — CHLORHEXIDINE GLUCONATE 1 APPLICATION(S): 213 SOLUTION TOPICAL at 12:19

## 2022-11-05 RX ADMIN — NAFCILLIN 200 GRAM(S): 10 INJECTION, POWDER, FOR SOLUTION INTRAVENOUS at 10:19

## 2022-11-05 RX ADMIN — OXYCODONE HYDROCHLORIDE 20 MILLIGRAM(S): 5 TABLET ORAL at 12:59

## 2022-11-05 RX ADMIN — Medication 10 MILLIGRAM(S): at 06:43

## 2022-11-05 RX ADMIN — Medication 10 MILLIGRAM(S): at 14:24

## 2022-11-05 NOTE — PROGRESS NOTE ADULT - SUBJECTIVE AND OBJECTIVE BOX
Cc: Gait dysfunction    HPI: Patient with no new medical issues today.  Pain controlled, no chest pain, no N/V, no Fevers/Chills. No other new ROS  Has been tolerating rehabilitation program.    MEDICATIONS  (STANDING):  baclofen 10 milliGRAM(s) Oral every 8 hours  chlorhexidine 2% Cloths 1 Application(s) Topical daily  famotidine    Tablet 20 milliGRAM(s) Oral daily  gabapentin 300 milliGRAM(s) Oral three times a day  heparin   Injectable 5000 Unit(s) SubCutaneous every 8 hours  lisinopril 40 milliGRAM(s) Oral daily  multivitamin 1 Tablet(s) Oral daily  nafcillin  IVPB 2 Gram(s) IV Intermittent every 4 hours  oxyCODONE  ER Tablet 20 milliGRAM(s) Oral <User Schedule>  polyethylene glycol 3350 17 Gram(s) Oral daily  saccharomyces boulardii 250 milliGRAM(s) Oral two times a day  senna 2 Tablet(s) Oral at bedtime    MEDICATIONS  (PRN):  acetaminophen     Tablet .. 650 milliGRAM(s) Oral every 6 hours PRN Temp greater or equal to 38C (100.4F), Mild Pain (1 - 3)  ALPRAZolam 0.25 milliGRAM(s) Oral two times a day PRN anxiety  oxyCODONE    IR 20 milliGRAM(s) Oral every 4 hours PRN Severe Pain (7 - 10)  simethicone 80 milliGRAM(s) Chew four times a day PRN Heartburn      Vital Signs Last 24 Hrs  T(C): 36.8 (05 Nov 2022 09:52), Max: 37.1 (04 Nov 2022 22:05)  T(F): 98.2 (05 Nov 2022 09:52), Max: 98.7 (04 Nov 2022 22:05)  HR: 71 (05 Nov 2022 09:52) (71 - 77)  BP: 145/81 (05 Nov 2022 09:52) (133/92 - 145/81)  BP(mean): --  RR: 16 (05 Nov 2022 09:52) (15 - 17)  SpO2: 97% (05 Nov 2022 09:52) (96% - 98%)    Parameters below as of 05 Nov 2022 09:52  Patient On (Oxygen Delivery Method): room air        In NAD  HEENT- EOMI  Heart- RRR, S1S2  Lungs- CTA bl.  Abd- + BS, NT  Ext- No calf pain  Neuro- Exam unchanged            CAPILLARY BLOOD GLUCOSE                    Imp: Patient with diagnosis of          admitted for comprehensive acute rehabilitation.    Plan:  - Continue therapies  - DVT prophylaxis  - Skin- Turn q2h, check skin daily  - Continue current medications; patient medically stable.   - Patient is stable to continue current rehabilitation program.    Cc: Gait dysfunction    HPI: Patient seen and examined  Reports recent wt gain, worried about this, (Truncal obesity)  Previously contemplating fasting, but agreed to engage with registered dietitian to address this  Tolerating diet and IV antibiotics    Pain controlled, no chest pain, no N/V, no Fevers/Chills.     Has been tolerating rehabilitation program.    MEDICATIONS  (STANDING):  baclofen 10 milliGRAM(s) Oral every 8 hours  chlorhexidine 2% Cloths 1 Application(s) Topical daily  famotidine    Tablet 20 milliGRAM(s) Oral daily  gabapentin 300 milliGRAM(s) Oral three times a day  heparin   Injectable 5000 Unit(s) SubCutaneous every 8 hours  lisinopril 40 milliGRAM(s) Oral daily  multivitamin 1 Tablet(s) Oral daily  nafcillin  IVPB 2 Gram(s) IV Intermittent every 4 hours  oxyCODONE  ER Tablet 20 milliGRAM(s) Oral <User Schedule>  polyethylene glycol 3350 17 Gram(s) Oral daily  saccharomyces boulardii 250 milliGRAM(s) Oral two times a day  senna 2 Tablet(s) Oral at bedtime    MEDICATIONS  (PRN):  acetaminophen     Tablet .. 650 milliGRAM(s) Oral every 6 hours PRN Temp greater or equal to 38C (100.4F), Mild Pain (1 - 3)  ALPRAZolam 0.25 milliGRAM(s) Oral two times a day PRN anxiety  oxyCODONE    IR 20 milliGRAM(s) Oral every 4 hours PRN Severe Pain (7 - 10)  simethicone 80 milliGRAM(s) Chew four times a day PRN Heartburn      Vital Signs Last 24 Hrs  T(C): 36.8 (05 Nov 2022 09:52), Max: 37.1 (04 Nov 2022 22:05)  T(F): 98.2 (05 Nov 2022 09:52), Max: 98.7 (04 Nov 2022 22:05)  HR: 71 (05 Nov 2022 09:52) (71 - 77)  BP: 145/81 (05 Nov 2022 09:52) (133/92 - 145/81)  BP(mean): --  RR: 16 (05 Nov 2022 09:52) (15 - 17)  SpO2: 97% (05 Nov 2022 09:52) (96% - 98%)    Parameters below as of 05 Nov 2022 09:52  Patient On (Oxygen Delivery Method): room air        In NAD  HEENT- EOMI  Heart- RRR, S1S2  Lungs- CTA bl.  Abd- + BS, NT  Ext- No calf pain, Left arm PICC line in situ    Neuro- AAO X 3      Imp: Patient with diagnosis of Lumbar epidural abscess with bacteremia s/p laminectomy admitted for comprehensive acute rehabilitation.    Plan:  - Continue therapies PT/OT  --Counseled to discuss with dietitian, his concert for body weight  - Continue current medications;  - Patient is stable to continue current rehabilitation program.

## 2022-11-06 LAB
ALBUMIN SERPL ELPH-MCNC: 2.4 G/DL — LOW (ref 3.3–5)
ALP SERPL-CCNC: 86 U/L — SIGNIFICANT CHANGE UP (ref 40–120)
ALT FLD-CCNC: 32 U/L — SIGNIFICANT CHANGE UP (ref 10–45)
AST SERPL-CCNC: 20 U/L — SIGNIFICANT CHANGE UP (ref 10–40)
BILIRUB DIRECT SERPL-MCNC: 0.1 MG/DL — SIGNIFICANT CHANGE UP (ref 0–0.3)
BILIRUB INDIRECT FLD-MCNC: 0.5 MG/DL — SIGNIFICANT CHANGE UP (ref 0.2–1)
BILIRUB SERPL-MCNC: 0.6 MG/DL — SIGNIFICANT CHANGE UP (ref 0.2–1.2)
PROT SERPL-MCNC: 6.9 G/DL — SIGNIFICANT CHANGE UP (ref 6–8.3)

## 2022-11-06 RX ORDER — ALPRAZOLAM 0.25 MG
0.25 TABLET ORAL ONCE
Refills: 0 | Status: DISCONTINUED | OUTPATIENT
Start: 2022-11-06 | End: 2022-11-06

## 2022-11-06 RX ADMIN — GABAPENTIN 300 MILLIGRAM(S): 400 CAPSULE ORAL at 21:38

## 2022-11-06 RX ADMIN — HEPARIN SODIUM 5000 UNIT(S): 5000 INJECTION INTRAVENOUS; SUBCUTANEOUS at 06:20

## 2022-11-06 RX ADMIN — OXYCODONE HYDROCHLORIDE 20 MILLIGRAM(S): 5 TABLET ORAL at 14:45

## 2022-11-06 RX ADMIN — SIMETHICONE 80 MILLIGRAM(S): 80 TABLET, CHEWABLE ORAL at 22:43

## 2022-11-06 RX ADMIN — NAFCILLIN 200 GRAM(S): 10 INJECTION, POWDER, FOR SOLUTION INTRAVENOUS at 06:21

## 2022-11-06 RX ADMIN — OXYCODONE HYDROCHLORIDE 20 MILLIGRAM(S): 5 TABLET ORAL at 00:00

## 2022-11-06 RX ADMIN — Medication 1 TABLET(S): at 11:46

## 2022-11-06 RX ADMIN — OXYCODONE HYDROCHLORIDE 20 MILLIGRAM(S): 5 TABLET ORAL at 05:00

## 2022-11-06 RX ADMIN — HEPARIN SODIUM 5000 UNIT(S): 5000 INJECTION INTRAVENOUS; SUBCUTANEOUS at 21:38

## 2022-11-06 RX ADMIN — Medication 10 MILLIGRAM(S): at 14:03

## 2022-11-06 RX ADMIN — OXYCODONE HYDROCHLORIDE 20 MILLIGRAM(S): 5 TABLET ORAL at 18:17

## 2022-11-06 RX ADMIN — NAFCILLIN 200 GRAM(S): 10 INJECTION, POWDER, FOR SOLUTION INTRAVENOUS at 01:48

## 2022-11-06 RX ADMIN — OXYCODONE HYDROCHLORIDE 20 MILLIGRAM(S): 5 TABLET ORAL at 19:38

## 2022-11-06 RX ADMIN — GABAPENTIN 300 MILLIGRAM(S): 400 CAPSULE ORAL at 14:01

## 2022-11-06 RX ADMIN — CHLORHEXIDINE GLUCONATE 1 APPLICATION(S): 213 SOLUTION TOPICAL at 11:47

## 2022-11-06 RX ADMIN — NAFCILLIN 200 GRAM(S): 10 INJECTION, POWDER, FOR SOLUTION INTRAVENOUS at 14:02

## 2022-11-06 RX ADMIN — NAFCILLIN 200 GRAM(S): 10 INJECTION, POWDER, FOR SOLUTION INTRAVENOUS at 10:11

## 2022-11-06 RX ADMIN — Medication 0.25 MILLIGRAM(S): at 22:45

## 2022-11-06 RX ADMIN — FAMOTIDINE 20 MILLIGRAM(S): 10 INJECTION INTRAVENOUS at 11:46

## 2022-11-06 RX ADMIN — HEPARIN SODIUM 5000 UNIT(S): 5000 INJECTION INTRAVENOUS; SUBCUTANEOUS at 14:02

## 2022-11-06 RX ADMIN — OXYCODONE HYDROCHLORIDE 20 MILLIGRAM(S): 5 TABLET ORAL at 08:58

## 2022-11-06 RX ADMIN — NAFCILLIN 200 GRAM(S): 10 INJECTION, POWDER, FOR SOLUTION INTRAVENOUS at 21:37

## 2022-11-06 RX ADMIN — GABAPENTIN 300 MILLIGRAM(S): 400 CAPSULE ORAL at 06:21

## 2022-11-06 RX ADMIN — OXYCODONE HYDROCHLORIDE 20 MILLIGRAM(S): 5 TABLET ORAL at 23:23

## 2022-11-06 RX ADMIN — Medication 0.25 MILLIGRAM(S): at 10:09

## 2022-11-06 RX ADMIN — OXYCODONE HYDROCHLORIDE 20 MILLIGRAM(S): 5 TABLET ORAL at 10:10

## 2022-11-06 RX ADMIN — OXYCODONE HYDROCHLORIDE 20 MILLIGRAM(S): 5 TABLET ORAL at 04:12

## 2022-11-06 RX ADMIN — LISINOPRIL 40 MILLIGRAM(S): 2.5 TABLET ORAL at 06:21

## 2022-11-06 RX ADMIN — OXYCODONE HYDROCHLORIDE 20 MILLIGRAM(S): 5 TABLET ORAL at 10:50

## 2022-11-06 RX ADMIN — OXYCODONE HYDROCHLORIDE 20 MILLIGRAM(S): 5 TABLET ORAL at 08:19

## 2022-11-06 RX ADMIN — NAFCILLIN 200 GRAM(S): 10 INJECTION, POWDER, FOR SOLUTION INTRAVENOUS at 18:04

## 2022-11-06 RX ADMIN — Medication 250 MILLIGRAM(S): at 06:21

## 2022-11-06 RX ADMIN — OXYCODONE HYDROCHLORIDE 20 MILLIGRAM(S): 5 TABLET ORAL at 19:22

## 2022-11-06 RX ADMIN — OXYCODONE HYDROCHLORIDE 20 MILLIGRAM(S): 5 TABLET ORAL at 14:16

## 2022-11-06 RX ADMIN — OXYCODONE HYDROCHLORIDE 20 MILLIGRAM(S): 5 TABLET ORAL at 22:45

## 2022-11-06 RX ADMIN — SENNA PLUS 2 TABLET(S): 8.6 TABLET ORAL at 21:37

## 2022-11-06 RX ADMIN — Medication 250 MILLIGRAM(S): at 18:04

## 2022-11-06 RX ADMIN — OXYCODONE HYDROCHLORIDE 20 MILLIGRAM(S): 5 TABLET ORAL at 19:14

## 2022-11-06 RX ADMIN — Medication 10 MILLIGRAM(S): at 06:21

## 2022-11-06 RX ADMIN — Medication 10 MILLIGRAM(S): at 21:37

## 2022-11-06 RX ADMIN — Medication 0.25 MILLIGRAM(S): at 19:38

## 2022-11-07 LAB
ALBUMIN SERPL ELPH-MCNC: 2.8 G/DL — LOW (ref 3.3–5)
ALP SERPL-CCNC: 99 U/L — SIGNIFICANT CHANGE UP (ref 40–120)
ALT FLD-CCNC: 39 U/L — SIGNIFICANT CHANGE UP (ref 10–45)
ANION GAP SERPL CALC-SCNC: 13 MMOL/L — SIGNIFICANT CHANGE UP (ref 5–17)
AST SERPL-CCNC: 25 U/L — SIGNIFICANT CHANGE UP (ref 10–40)
BILIRUB SERPL-MCNC: 0.7 MG/DL — SIGNIFICANT CHANGE UP (ref 0.2–1.2)
BUN SERPL-MCNC: 14 MG/DL — SIGNIFICANT CHANGE UP (ref 7–23)
CALCIUM SERPL-MCNC: 9.3 MG/DL — SIGNIFICANT CHANGE UP (ref 8.4–10.5)
CHLORIDE SERPL-SCNC: 103 MMOL/L — SIGNIFICANT CHANGE UP (ref 96–108)
CO2 SERPL-SCNC: 23 MMOL/L — SIGNIFICANT CHANGE UP (ref 22–31)
CREAT SERPL-MCNC: 0.75 MG/DL — SIGNIFICANT CHANGE UP (ref 0.5–1.3)
CRP SERPL-MCNC: 15 MG/L — HIGH
EGFR: 108 ML/MIN/1.73M2 — SIGNIFICANT CHANGE UP
ERYTHROCYTE [SEDIMENTATION RATE] IN BLOOD: 109 MM/HR — HIGH (ref 0–20)
GLUCOSE SERPL-MCNC: 122 MG/DL — HIGH (ref 70–99)
HCT VFR BLD CALC: 38.7 % — LOW (ref 39–50)
HGB BLD-MCNC: 12.4 G/DL — LOW (ref 13–17)
MCHC RBC-ENTMCNC: 30.9 PG — SIGNIFICANT CHANGE UP (ref 27–34)
MCHC RBC-ENTMCNC: 32 GM/DL — SIGNIFICANT CHANGE UP (ref 32–36)
MCV RBC AUTO: 96.5 FL — SIGNIFICANT CHANGE UP (ref 80–100)
NRBC # BLD: 0 /100 WBCS — SIGNIFICANT CHANGE UP (ref 0–0)
PLATELET # BLD AUTO: 458 K/UL — HIGH (ref 150–400)
POTASSIUM SERPL-MCNC: 4 MMOL/L — SIGNIFICANT CHANGE UP (ref 3.5–5.3)
POTASSIUM SERPL-SCNC: 4 MMOL/L — SIGNIFICANT CHANGE UP (ref 3.5–5.3)
PROT SERPL-MCNC: 7.8 G/DL — SIGNIFICANT CHANGE UP (ref 6–8.3)
RBC # BLD: 4.01 M/UL — LOW (ref 4.2–5.8)
RBC # FLD: 13.2 % — SIGNIFICANT CHANGE UP (ref 10.3–14.5)
SODIUM SERPL-SCNC: 139 MMOL/L — SIGNIFICANT CHANGE UP (ref 135–145)
WBC # BLD: 5.41 K/UL — SIGNIFICANT CHANGE UP (ref 3.8–10.5)
WBC # FLD AUTO: 5.41 K/UL — SIGNIFICANT CHANGE UP (ref 3.8–10.5)

## 2022-11-07 PROCEDURE — 90832 PSYTX W PT 30 MINUTES: CPT

## 2022-11-07 PROCEDURE — 74019 RADEX ABDOMEN 2 VIEWS: CPT | Mod: 26

## 2022-11-07 PROCEDURE — 99233 SBSQ HOSP IP/OBS HIGH 50: CPT

## 2022-11-07 RX ORDER — TIZANIDINE 4 MG/1
2 TABLET ORAL THREE TIMES A DAY
Refills: 0 | Status: DISCONTINUED | OUTPATIENT
Start: 2022-11-07 | End: 2022-11-07

## 2022-11-07 RX ORDER — SOD SULF/SODIUM/NAHCO3/KCL/PEG
1000 SOLUTION, RECONSTITUTED, ORAL ORAL ONCE
Refills: 0 | Status: COMPLETED | OUTPATIENT
Start: 2022-11-07 | End: 2022-11-07

## 2022-11-07 RX ORDER — MINERAL OIL
133 OIL (ML) MISCELLANEOUS ONCE
Refills: 0 | Status: COMPLETED | OUTPATIENT
Start: 2022-11-07 | End: 2022-11-07

## 2022-11-07 RX ORDER — OXYCODONE HYDROCHLORIDE 5 MG/1
20 TABLET ORAL
Refills: 0 | Status: DISCONTINUED | OUTPATIENT
Start: 2022-11-07 | End: 2022-11-14

## 2022-11-07 RX ORDER — HYDROMORPHONE HYDROCHLORIDE 2 MG/ML
1 INJECTION INTRAMUSCULAR; INTRAVENOUS; SUBCUTANEOUS ONCE
Refills: 0 | Status: DISCONTINUED | OUTPATIENT
Start: 2022-11-07 | End: 2022-11-07

## 2022-11-07 RX ORDER — OXYCODONE HYDROCHLORIDE 5 MG/1
20 TABLET ORAL EVERY 4 HOURS
Refills: 0 | Status: DISCONTINUED | OUTPATIENT
Start: 2022-11-07 | End: 2022-11-14

## 2022-11-07 RX ORDER — ENOXAPARIN SODIUM 100 MG/ML
40 INJECTION SUBCUTANEOUS EVERY 24 HOURS
Refills: 0 | Status: DISCONTINUED | OUTPATIENT
Start: 2022-11-07 | End: 2022-11-17

## 2022-11-07 RX ADMIN — HEPARIN SODIUM 5000 UNIT(S): 5000 INJECTION INTRAVENOUS; SUBCUTANEOUS at 13:26

## 2022-11-07 RX ADMIN — Medication 650 MILLIGRAM(S): at 09:00

## 2022-11-07 RX ADMIN — OXYCODONE HYDROCHLORIDE 20 MILLIGRAM(S): 5 TABLET ORAL at 21:10

## 2022-11-07 RX ADMIN — FAMOTIDINE 20 MILLIGRAM(S): 10 INJECTION INTRAVENOUS at 12:05

## 2022-11-07 RX ADMIN — OXYCODONE HYDROCHLORIDE 20 MILLIGRAM(S): 5 TABLET ORAL at 17:39

## 2022-11-07 RX ADMIN — OXYCODONE HYDROCHLORIDE 20 MILLIGRAM(S): 5 TABLET ORAL at 06:38

## 2022-11-07 RX ADMIN — OXYCODONE HYDROCHLORIDE 20 MILLIGRAM(S): 5 TABLET ORAL at 06:56

## 2022-11-07 RX ADMIN — NAFCILLIN 200 GRAM(S): 10 INJECTION, POWDER, FOR SOLUTION INTRAVENOUS at 21:14

## 2022-11-07 RX ADMIN — Medication 10 MILLIGRAM(S): at 13:26

## 2022-11-07 RX ADMIN — Medication 250 MILLIGRAM(S): at 17:44

## 2022-11-07 RX ADMIN — NAFCILLIN 200 GRAM(S): 10 INJECTION, POWDER, FOR SOLUTION INTRAVENOUS at 02:52

## 2022-11-07 RX ADMIN — Medication 1 TABLET(S): at 12:05

## 2022-11-07 RX ADMIN — SIMETHICONE 80 MILLIGRAM(S): 80 TABLET, CHEWABLE ORAL at 08:52

## 2022-11-07 RX ADMIN — Medication 10 MILLIGRAM(S): at 06:38

## 2022-11-07 RX ADMIN — GABAPENTIN 300 MILLIGRAM(S): 400 CAPSULE ORAL at 21:28

## 2022-11-07 RX ADMIN — NAFCILLIN 200 GRAM(S): 10 INJECTION, POWDER, FOR SOLUTION INTRAVENOUS at 17:41

## 2022-11-07 RX ADMIN — OXYCODONE HYDROCHLORIDE 20 MILLIGRAM(S): 5 TABLET ORAL at 14:00

## 2022-11-07 RX ADMIN — LISINOPRIL 40 MILLIGRAM(S): 2.5 TABLET ORAL at 06:38

## 2022-11-07 RX ADMIN — NAFCILLIN 200 GRAM(S): 10 INJECTION, POWDER, FOR SOLUTION INTRAVENOUS at 13:27

## 2022-11-07 RX ADMIN — Medication 650 MILLIGRAM(S): at 08:50

## 2022-11-07 RX ADMIN — ENOXAPARIN SODIUM 40 MILLIGRAM(S): 100 INJECTION SUBCUTANEOUS at 21:29

## 2022-11-07 RX ADMIN — Medication 0.25 MILLIGRAM(S): at 08:49

## 2022-11-07 RX ADMIN — OXYCODONE HYDROCHLORIDE 20 MILLIGRAM(S): 5 TABLET ORAL at 07:47

## 2022-11-07 RX ADMIN — SENNA PLUS 2 TABLET(S): 8.6 TABLET ORAL at 21:28

## 2022-11-07 RX ADMIN — HEPARIN SODIUM 5000 UNIT(S): 5000 INJECTION INTRAVENOUS; SUBCUTANEOUS at 06:39

## 2022-11-07 RX ADMIN — OXYCODONE HYDROCHLORIDE 20 MILLIGRAM(S): 5 TABLET ORAL at 13:26

## 2022-11-07 RX ADMIN — Medication 10 MILLIGRAM(S): at 21:28

## 2022-11-07 RX ADMIN — NAFCILLIN 200 GRAM(S): 10 INJECTION, POWDER, FOR SOLUTION INTRAVENOUS at 09:50

## 2022-11-07 RX ADMIN — Medication 250 MILLIGRAM(S): at 06:38

## 2022-11-07 RX ADMIN — GABAPENTIN 300 MILLIGRAM(S): 400 CAPSULE ORAL at 06:39

## 2022-11-07 RX ADMIN — OXYCODONE HYDROCHLORIDE 20 MILLIGRAM(S): 5 TABLET ORAL at 03:00

## 2022-11-07 RX ADMIN — GABAPENTIN 300 MILLIGRAM(S): 400 CAPSULE ORAL at 13:26

## 2022-11-07 RX ADMIN — NAFCILLIN 200 GRAM(S): 10 INJECTION, POWDER, FOR SOLUTION INTRAVENOUS at 06:26

## 2022-11-07 RX ADMIN — Medication 1000 MILLILITER(S): at 17:44

## 2022-11-07 RX ADMIN — OXYCODONE HYDROCHLORIDE 20 MILLIGRAM(S): 5 TABLET ORAL at 18:00

## 2022-11-07 RX ADMIN — Medication 0.25 MILLIGRAM(S): at 20:59

## 2022-11-07 RX ADMIN — OXYCODONE HYDROCHLORIDE 20 MILLIGRAM(S): 5 TABLET ORAL at 20:42

## 2022-11-07 RX ADMIN — HYDROMORPHONE HYDROCHLORIDE 1 MILLIGRAM(S): 2 INJECTION INTRAMUSCULAR; INTRAVENOUS; SUBCUTANEOUS at 09:43

## 2022-11-07 RX ADMIN — OXYCODONE HYDROCHLORIDE 20 MILLIGRAM(S): 5 TABLET ORAL at 02:57

## 2022-11-07 NOTE — PROGRESS NOTE ADULT - SUBJECTIVE AND OBJECTIVE BOX
Patient participated in 25-minute, follow-up, supportive session. He indicates heightened stress associated with pain and reduced mobility.

## 2022-11-07 NOTE — PROGRESS NOTE ADULT - SUBJECTIVE AND OBJECTIVE BOX
Patient is a 52y old  Male who presents with a chief complaint of epidural abscess and lumbar lami     SUBJECTIVE & REVIEW OF SYMPTOMS:  Patient seen and evaluated while resting in bed - independently having lunch in semi supine position  Reports having a "set back" because of choking incident yesterday.  Encouraged to be upright for meals.  Declined because of pain/back spasm.  But able to tolerate going to therapy in WC?   Still gets spontaneous pain/spasm on current regimen (baclofen 10 TID and oxycodone 20 q4h PRN). Patient states that he was previously on valium for spasm with better controlled. Discuss risks/benefits of valium and further discuss another antispasmodic medication tizanidine.  Will start Tizanidine 2mg TID.  Reports soft BM since surgery - abd distended, +flatus.  Reviewed potential causes and will follow up with abd to r/o ileus.  Will trial enema.  Denies CP, palpitation, SOB, HOOK, HA, dizziness, nausea, dysuria.  Has full control of bowel and bladder       PHYSICAL EXAM  Vital Signs Last 24 Hrs  T(C): 36.8 (11-07-22 @ 08:15), Max: 36.8 (11-07-22 @ 08:15)  T(F): 98.3 (11-07-22 @ 08:15), Max: 98.3 (11-07-22 @ 08:15)  HR: 88 (11-07-22 @ 08:15) (88 - 89)  BP: 164/86 (11-07-22 @ 08:15) (149/82 - 164/86)  RR: 14 (11-07-22 @ 08:15) (14 - 15)  SpO2: 96% (11-07-22 @ 08:15) (96% - 97%)    Constitutional - NAD  Chest - breathing comfortably   Cardiovascular - warm and well perfused   Abdomen - mildly distended, tympanic throughout  Extremities - No C/C/E, No calf tenderness   Motor: moving all extremities at least antigravity   Psychiatric - anxious     LAB                        12.4   5.41  )-----------( 458      ( 07 Nov 2022 07:10 )             38.7     11-07    139  |  103  |  14  ----------------------------<  122<H>  4.0   |  23  |  0.75    Ca    9.3      07 Nov 2022 07:10    TPro  7.8  /  Alb  2.8<L>  /  TBili  0.7  /  DBili  x   /  AST  25  /  ALT  39  /  AlkPhos  99  11-07    LIVER FUNCTIONS - ( 07 Nov 2022 07:10 )  Alb: 2.8 g/dL / Pro: 7.8 g/dL / ALK PHOS: 99 U/L / ALT: 39 U/L / AST: 25 U/L / GGT: x           MEDICATIONS  (STANDING):  baclofen 10 milliGRAM(s) Oral every 8 hours  chlorhexidine 2% Cloths 1 Application(s) Topical daily  famotidine    Tablet 20 milliGRAM(s) Oral daily  gabapentin 300 milliGRAM(s) Oral three times a day  heparin   Injectable 5000 Unit(s) SubCutaneous every 8 hours  lisinopril 40 milliGRAM(s) Oral daily  mineral oil enema 133 milliLiter(s) Rectal once  multivitamin 1 Tablet(s) Oral daily  nafcillin  IVPB 2 Gram(s) IV Intermittent every 4 hours  oxyCODONE  ER Tablet 20 milliGRAM(s) Oral <User Schedule>  polyethylene glycol 3350 17 Gram(s) Oral daily  saccharomyces boulardii 250 milliGRAM(s) Oral two times a day  senna 2 Tablet(s) Oral at bedtime  tiZANidine 2 milliGRAM(s) Oral three times a day    MEDICATIONS  (PRN):  acetaminophen     Tablet .. 650 milliGRAM(s) Oral every 6 hours PRN Temp greater or equal to 38C (100.4F), Mild Pain (1 - 3)  ALPRAZolam 0.25 milliGRAM(s) Oral two times a day PRN anxiety  oxyCODONE    IR 20 milliGRAM(s) Oral every 4 hours PRN Severe Pain (7 - 10)  simethicone 80 milliGRAM(s) Chew four times a day PRN Heartburn

## 2022-11-07 NOTE — PROGRESS NOTE ADULT - ASSESSMENT
53 YO male with PMH of lumbar Spinal Stenosis, Kidney Stone, and chronic back pain due to sports injuries who presented to [facility] on 10/14 with c/o Right Sided Shoulder Pain and acute on chronic Lower back pain. Shoulder MRI with Mildly displaced anterior glenoid labrum extending from the three to 4:00 position with evidence of an ALPSA lesion. Soft tissue swelling and edema seen in the region of the distal clavicle involving the trapezius musculature. s/p Right shoulder subacromial injection 10/17/2022. Hospital course significant for sepsis, no on long term IV ABX. Patient now with gait Instability, ADL impairments and Functional impairments.    #Mildly displaced anterior glenoid labrum   #Lumbar Spinal Stenosis  - s/p Right shoulder subacromial injection 10/17/2022  - s/p L2-S1 laminectomy and washout on 10/22/22  - pain and bowel regimen as per rehab    #MSSA bacteremia due to epidural abscess and bilateral psoas abscess  - s/p wound washout   - repeat blood cx on 10/22 negative  - Naficillin 2G Q 4hrs till 12/3  - Left DL PICC line inserted on 10/30    #HTN  - lisinopril   - control pain    #DVT ppx  - Heparin    #GI ppx  - Pepcid     will follow  d/w dr. roth

## 2022-11-07 NOTE — PROGRESS NOTE ADULT - ASSESSMENT
53 YO male with PMH of lumbar Spinal Stenosis, Kidney Stone, and chronic back pain admitted to rehab after recent hospitalisation for increased pain and finding of Bacteremia Blood cx 's + GPC MSSA , L2- S1 laminectomy and evacuation of epidural abscess and washout on 10/22.. s/p Right shoulder subacromial injection 10/17/2022.      Continue  Comprehensive Rehab Program: PT/OT, 3hours daily and 5 days weekly    *soft BM - abd xray to r/o ileus. Enema to for more complete evacuation of fecal matter  *Back spasm - add tizanidine 2mg TID  *Encourage OOB for meals (at least).  Should be more mobile to help with peristalsis   *CRP 15,  (11/7)    #Sepsis, Lumbar epidural abscess MSSA bacteremia, - s/p L2-S1 laminectomy and washout on 10/22/22- Further chart review of ID notes from outside hospital also shows bilateral psoas abscess    - Naficillin 2G Q 4hrs till 12/3 + Probiotics  - Left  UE PICC line inserted on 10/30  - Weekly ESR, CRP, CMP, and CBC   KD negative for vegetation   Abd/pelvis - no drainable abscess   Blood cultures 10/22 - NG     #HTN:  - lisinopril 40mg daily    #Pain management  - Tylenol PRN, Oxycodone 20 PRN, Oxycontin 20 BID, Neurontin 300 TID   baclofen 10mg tid prn spasms   +Tizanidine 2mg TID  May use TENS to right SI region prn     Mood: anxiety about current medical course.   Neuropsych consult appreciated  - Xanax 0.25mg bid prn      #DVT ppx  - Heparin TID - renal function is good, can consider switching to Lovenox QD  - SCD, TEDs    #GI ppx  - Pepcid 20mg     #Bowel Regimen:  - Senna, miralax   - Abd xray to r/o ileus  - Enema x1     #Bladder management:   - continent. Will dc bladder scans     # Diet:   - Regular    #Skin:  - Skin on admission: Lumbar spine incision with staples KATARZYNA  - Pressure injury/Skin: Turn Q2hrs while in bed, OOB to Chair, PT/OT     #Sleep: meds as needed     #Precaution: - Fall, Spinal  LSO when OOB with therapy  and activity ( Per ortho )

## 2022-11-07 NOTE — PROGRESS NOTE ADULT - SUBJECTIVE AND OBJECTIVE BOX
52y old  Male who presents with a chief complaint of Lumbar Laminectomy       Patient seen and examined at bedside, c/o pain in the right lower back, 10/10, aggravated by movements, radiates to right SI joint, received oxycodone at 6 am, without any relief   no fever, chills, cough, sob, cp, abd pain.      Vital Signs Last 24 Hrs  T(C): 36.8 (07 Nov 2022 08:15), Max: 36.8 (07 Nov 2022 08:15)  T(F): 98.3 (07 Nov 2022 08:15), Max: 98.3 (07 Nov 2022 08:15)  HR: 88 (07 Nov 2022 08:15) (88 - 89)  BP: 164/86 (07 Nov 2022 08:15) (149/82 - 164/86)  BP(mean): --  RR: 14 (07 Nov 2022 08:15) (14 - 15)  SpO2: 96% (07 Nov 2022 08:15) (96% - 97%)    Parameters below as of 07 Nov 2022 08:15  Patient On (Oxygen Delivery Method): room air    GENERAL- NAD  EAR/NOSE/MOUTH/THROAT - no pharyngeal exudates, no oral leisions,  MMM  EYES- MERRICK, conjunctiva and Sclera clear  NECK- supple  RESPIRATORY-  clear to auscultation bilaterally, non laboured breathing  CARDIOVASCULAR - SIS2, RRR  GI - soft NT BS present  EXTREMITIES- no pedal edema  NEUROLOGY- B/L LE WEAKNESS  PSYCHIATRY- AAO X 3                12.4                 139  | 23   | 14           5.41  >-----------< 458     ------------------------< 122                   38.7                 4.0  | 103  | 0.75                                         Ca 9.3   Mg x     Ph x          MEDICATIONS  (STANDING):  baclofen 10 milliGRAM(s) Oral every 8 hours  chlorhexidine 2% Cloths 1 Application(s) Topical daily  famotidine    Tablet 20 milliGRAM(s) Oral daily  gabapentin 300 milliGRAM(s) Oral three times a day  heparin   Injectable 5000 Unit(s) SubCutaneous every 8 hours  lisinopril 40 milliGRAM(s) Oral daily  mineral oil enema 133 milliLiter(s) Rectal once  multivitamin 1 Tablet(s) Oral daily  nafcillin  IVPB 2 Gram(s) IV Intermittent every 4 hours  oxyCODONE  ER Tablet 20 milliGRAM(s) Oral <User Schedule>  polyethylene glycol 3350 17 Gram(s) Oral daily  saccharomyces boulardii 250 milliGRAM(s) Oral two times a day  senna 2 Tablet(s) Oral at bedtime  tiZANidine 2 milliGRAM(s) Oral three times a day    MEDICATIONS  (PRN):  acetaminophen     Tablet .. 650 milliGRAM(s) Oral every 6 hours PRN Temp greater or equal to 38C (100.4F), Mild Pain (1 - 3)  ALPRAZolam 0.25 milliGRAM(s) Oral two times a day PRN anxiety  oxyCODONE    IR 20 milliGRAM(s) Oral every 4 hours PRN Severe Pain (7 - 10)  simethicone 80 milliGRAM(s) Chew four times a day PRN Heartburn

## 2022-11-07 NOTE — PROGRESS NOTE ADULT - ASSESSMENT
He reports anxiety and worry associated with various physical factors. He is alert and oriented x4. He is engaged and cooperative in session. He is projecting into the future regarding his ability to reintegrate into sports and other activities of interest. CBT approach is used to address the relationship between his thoughts and anxiety and encourage greater mindfulness. Helped patient recognize how anxiety impacts his clarity of thinking (i.e., catastrophic) and results in panic-like feelings. Practiced mindfulness strategies, such as slowing down thoughts, pacing self, and listening to his body regarding physical limitations at this time. Problem solving also used to encourage exploration of modified activities. Plan: Follow-up sessions to continue. Neuropsychology remains available.

## 2022-11-08 LAB — SARS-COV-2 RNA SPEC QL NAA+PROBE: SIGNIFICANT CHANGE UP

## 2022-11-08 PROCEDURE — 99233 SBSQ HOSP IP/OBS HIGH 50: CPT

## 2022-11-08 PROCEDURE — 99232 SBSQ HOSP IP/OBS MODERATE 35: CPT | Mod: GC

## 2022-11-08 RX ORDER — TIZANIDINE 4 MG/1
2 TABLET ORAL THREE TIMES A DAY
Refills: 0 | Status: DISCONTINUED | OUTPATIENT
Start: 2022-11-08 | End: 2022-11-17

## 2022-11-08 RX ORDER — SOD SULF/SODIUM/NAHCO3/KCL/PEG
1000 SOLUTION, RECONSTITUTED, ORAL ORAL ONCE
Refills: 0 | Status: COMPLETED | OUTPATIENT
Start: 2022-11-08 | End: 2022-11-08

## 2022-11-08 RX ADMIN — ENOXAPARIN SODIUM 40 MILLIGRAM(S): 100 INJECTION SUBCUTANEOUS at 20:57

## 2022-11-08 RX ADMIN — NAFCILLIN 200 GRAM(S): 10 INJECTION, POWDER, FOR SOLUTION INTRAVENOUS at 21:51

## 2022-11-08 RX ADMIN — CHLORHEXIDINE GLUCONATE 1 APPLICATION(S): 213 SOLUTION TOPICAL at 05:38

## 2022-11-08 RX ADMIN — Medication 250 MILLIGRAM(S): at 05:39

## 2022-11-08 RX ADMIN — Medication 250 MILLIGRAM(S): at 18:46

## 2022-11-08 RX ADMIN — Medication 10 MILLIGRAM(S): at 05:39

## 2022-11-08 RX ADMIN — NAFCILLIN 200 GRAM(S): 10 INJECTION, POWDER, FOR SOLUTION INTRAVENOUS at 18:46

## 2022-11-08 RX ADMIN — OXYCODONE HYDROCHLORIDE 20 MILLIGRAM(S): 5 TABLET ORAL at 20:56

## 2022-11-08 RX ADMIN — NAFCILLIN 200 GRAM(S): 10 INJECTION, POWDER, FOR SOLUTION INTRAVENOUS at 02:03

## 2022-11-08 RX ADMIN — TIZANIDINE 2 MILLIGRAM(S): 4 TABLET ORAL at 21:51

## 2022-11-08 RX ADMIN — OXYCODONE HYDROCHLORIDE 20 MILLIGRAM(S): 5 TABLET ORAL at 14:50

## 2022-11-08 RX ADMIN — Medication 0.25 MILLIGRAM(S): at 08:21

## 2022-11-08 RX ADMIN — NAFCILLIN 200 GRAM(S): 10 INJECTION, POWDER, FOR SOLUTION INTRAVENOUS at 05:38

## 2022-11-08 RX ADMIN — GABAPENTIN 300 MILLIGRAM(S): 400 CAPSULE ORAL at 05:38

## 2022-11-08 RX ADMIN — LISINOPRIL 40 MILLIGRAM(S): 2.5 TABLET ORAL at 05:39

## 2022-11-08 RX ADMIN — Medication 10 MILLIGRAM(S): at 21:51

## 2022-11-08 RX ADMIN — Medication 0.25 MILLIGRAM(S): at 22:28

## 2022-11-08 RX ADMIN — Medication 1000 MILLILITER(S): at 23:49

## 2022-11-08 RX ADMIN — OXYCODONE HYDROCHLORIDE 20 MILLIGRAM(S): 5 TABLET ORAL at 08:21

## 2022-11-08 RX ADMIN — GABAPENTIN 300 MILLIGRAM(S): 400 CAPSULE ORAL at 21:51

## 2022-11-08 RX ADMIN — OXYCODONE HYDROCHLORIDE 20 MILLIGRAM(S): 5 TABLET ORAL at 18:47

## 2022-11-08 RX ADMIN — OXYCODONE HYDROCHLORIDE 20 MILLIGRAM(S): 5 TABLET ORAL at 13:57

## 2022-11-08 RX ADMIN — OXYCODONE HYDROCHLORIDE 20 MILLIGRAM(S): 5 TABLET ORAL at 09:00

## 2022-11-08 RX ADMIN — SENNA PLUS 2 TABLET(S): 8.6 TABLET ORAL at 21:51

## 2022-11-08 NOTE — PROGRESS NOTE ADULT - ASSESSMENT
53 YO male with PMH of lumbar Spinal Stenosis, Kidney Stone, and chronic back pain due to sports injuries who presented to [facility] on 10/14 with c/o Right Sided Shoulder Pain and acute on chronic Lower back pain. Shoulder MRI with Mildly displaced anterior glenoid labrum extending from the three to 4:00 position with evidence of an ALPSA lesion. Soft tissue swelling and edema seen in the region of the distal clavicle involving the trapezius musculature. s/p Right shoulder subacromial injection 10/17/2022. Hospital course significant for sepsis, no on long term IV ABX. Patient now with gait Instability, ADL impairments and Functional impairments.    #Mildly displaced anterior glenoid labrum   #Lumbar Spinal Stenosis  - s/p Right shoulder subacromial injection 10/17/2022  - s/p L2-S1 laminectomy and washout on 10/22/22  - pain and bowel regimen as per rehab    #MSSA bacteremia due to epidural abscess and bilateral psoas abscess  - s/p wound washout   - repeat blood cx on 10/22 negative  - Naficillin 2G Q 4hrs till 12/3  - Left DL PICC line inserted on 10/30    #HTN  - lisinopril   - control pain    #DVT ppx  - Heparin    #GI ppx  - Pepcid     will follow  d/w dr. roth

## 2022-11-08 NOTE — PROGRESS NOTE ADULT - SUBJECTIVE AND OBJECTIVE BOX
52y old  Male who presents with a chief complaint of Lumbar Laminectomy     Patient seen and examined at bedside, this am c/o pain in the right lower back, 7/10, aggravated by movements, radiates to right SI joint, feels slightly better than yesterday.   no fever, chills, cough, sob, cp, abd pain.    Vital Signs Last 24 Hrs  T(C): 36.7 (07 Nov 2022 19:53), Max: 36.7 (07 Nov 2022 19:53)  T(F): 98 (07 Nov 2022 19:53), Max: 98 (07 Nov 2022 19:53)  HR: 100 (08 Nov 2022 08:27) (80 - 100)  BP: 163/100 (08 Nov 2022 08:27) (141/86 - 163/100)  BP(mean): --  RR: 16 (08 Nov 2022 08:27) (14 - 16)  SpO2: 97% (08 Nov 2022 08:27) (96% - 97%)    Parameters below as of 08 Nov 2022 08:27  Patient On (Oxygen Delivery Method): room air      GENERAL- NAD  EAR/NOSE/MOUTH/THROAT - no pharyngeal exudates, no oral leisions,  MMM  EYES- MERRICK, conjunctiva and Sclera clear  NECK- supple  RESPIRATORY-  clear to auscultation bilaterally, non laboured breathing  CARDIOVASCULAR - SIS2, RRR  GI - soft NT BS present  EXTREMITIES- no pedal edema  NEUROLOGY- B/L LE WEAKNESS  PSYCHIATRY- AAO X 3                  12.4                 139  | 23   | 14           5.41  >-----------< 458     ------------------------< 122                   38.7                 4.0  | 103  | 0.75                                         Ca 9.3   Mg x     Ph x        MEDICATIONS  (STANDING):  baclofen 10 milliGRAM(s) Oral every 8 hours  chlorhexidine 2% Cloths 1 Application(s) Topical daily  enoxaparin Injectable 40 milliGRAM(s) SubCutaneous every 24 hours  famotidine    Tablet 20 milliGRAM(s) Oral daily  gabapentin 300 milliGRAM(s) Oral three times a day  lisinopril 40 milliGRAM(s) Oral daily  multivitamin 1 Tablet(s) Oral daily  nafcillin  IVPB 2 Gram(s) IV Intermittent every 4 hours  oxyCODONE  ER Tablet 20 milliGRAM(s) Oral <User Schedule>  polyethylene glycol 3350 17 Gram(s) Oral daily  saccharomyces boulardii 250 milliGRAM(s) Oral two times a day  senna 2 Tablet(s) Oral at bedtime    MEDICATIONS  (PRN):  acetaminophen     Tablet .. 650 milliGRAM(s) Oral every 6 hours PRN Temp greater or equal to 38C (100.4F), Mild Pain (1 - 3)  ALPRAZolam 0.25 milliGRAM(s) Oral two times a day PRN anxiety  oxyCODONE    IR 20 milliGRAM(s) Oral every 4 hours PRN Severe Pain (7 - 10)  simethicone 80 milliGRAM(s) Chew four times a day PRN Heartburn

## 2022-11-08 NOTE — PROGRESS NOTE ADULT - ASSESSMENT
53 YO male with PMH of lumbar Spinal Stenosis, Kidney Stone, and chronic back pain admitted to rehab after recent hospitalisation for increased pain and finding of Bacteremia Blood cx 's + GPC MSSA , L2- S1 laminectomy and evacuation of epidural abscess and washout on 10/22.. s/p Right shoulder subacromial injection 10/17/2022.      Continue  Comprehensive Rehab Program: PT/OT, 3hours daily and 5 days weekly      *Back spasm - add tizanidine 2mg TID  *Encourage OOB for meals (at least).       #Sepsis, Lumbar epidural abscess MSSA bacteremia, - s/p L2-S1 laminectomy and washout on 10/22/22- Further chart review of ID notes from outside hospital also shows bilateral psoas abscess    - Naficillin 2G Q 4hrs till 12/3 + Probiotics  - Left  UE PICC line inserted on 10/30  - Weekly ESR, CRP, CMP, and CBC - ESR trending up a little. WIll monitor and ask ID to see pt if continues to rise   KD negative for vegetation   Abd/pelvis - no drainable abscess   Blood cultures 10/22 - NG     #HTN:  - lisinopril 40mg daily    #Pain management  - Tylenol PRN, Oxycodone 20 PRN, Oxycontin 20 BID, Neurontin 300 TID   baclofen 10mg tid prn spasms   +Tizanidine 2mg TID  May use TENS to right SI region prn     Mood: anxiety about current medical course.   Neuropsych consult appreciated  - Xanax 0.25mg bid prn      #DVT ppx  - Heparin TID - renal function is good, can consider switching to Lovenox QD  - SCD, TEDs    #GI ppx  - Pepcid 20mg     #Bowel Regimen:  - Senna, miralax   -moviprep again today     #Bladder management:   - continent. Will dc bladder scans     # Diet:   - Regular    #Skin:  - Skin on admission: Lumbar spine incision with staples KATARZYNA  - Pressure injury/Skin: Turn Q2hrs while in bed, OOB to Chair, PT/OT     #Sleep: meds as needed     #Precaution: - Fall, Spinal  LSO when OOB with therapy  and activity ( Per ortho )

## 2022-11-09 PROCEDURE — 99233 SBSQ HOSP IP/OBS HIGH 50: CPT

## 2022-11-09 PROCEDURE — 99232 SBSQ HOSP IP/OBS MODERATE 35: CPT | Mod: GC

## 2022-11-09 PROCEDURE — 90834 PSYTX W PT 45 MINUTES: CPT

## 2022-11-09 RX ORDER — ALPRAZOLAM 0.25 MG
0.25 TABLET ORAL
Refills: 0 | Status: DISCONTINUED | OUTPATIENT
Start: 2022-11-09 | End: 2022-11-16

## 2022-11-09 RX ADMIN — Medication 10 MILLIGRAM(S): at 22:08

## 2022-11-09 RX ADMIN — NAFCILLIN 200 GRAM(S): 10 INJECTION, POWDER, FOR SOLUTION INTRAVENOUS at 13:01

## 2022-11-09 RX ADMIN — OXYCODONE HYDROCHLORIDE 20 MILLIGRAM(S): 5 TABLET ORAL at 09:19

## 2022-11-09 RX ADMIN — NAFCILLIN 200 GRAM(S): 10 INJECTION, POWDER, FOR SOLUTION INTRAVENOUS at 17:00

## 2022-11-09 RX ADMIN — OXYCODONE HYDROCHLORIDE 20 MILLIGRAM(S): 5 TABLET ORAL at 12:59

## 2022-11-09 RX ADMIN — TIZANIDINE 2 MILLIGRAM(S): 4 TABLET ORAL at 22:08

## 2022-11-09 RX ADMIN — NAFCILLIN 200 GRAM(S): 10 INJECTION, POWDER, FOR SOLUTION INTRAVENOUS at 06:41

## 2022-11-09 RX ADMIN — Medication 10 MILLIGRAM(S): at 13:00

## 2022-11-09 RX ADMIN — OXYCODONE HYDROCHLORIDE 20 MILLIGRAM(S): 5 TABLET ORAL at 20:38

## 2022-11-09 RX ADMIN — Medication 0.25 MILLIGRAM(S): at 22:08

## 2022-11-09 RX ADMIN — Medication 250 MILLIGRAM(S): at 17:01

## 2022-11-09 RX ADMIN — OXYCODONE HYDROCHLORIDE 20 MILLIGRAM(S): 5 TABLET ORAL at 08:18

## 2022-11-09 RX ADMIN — OXYCODONE HYDROCHLORIDE 20 MILLIGRAM(S): 5 TABLET ORAL at 08:38

## 2022-11-09 RX ADMIN — Medication 250 MILLIGRAM(S): at 06:50

## 2022-11-09 RX ADMIN — NAFCILLIN 200 GRAM(S): 10 INJECTION, POWDER, FOR SOLUTION INTRAVENOUS at 22:05

## 2022-11-09 RX ADMIN — OXYCODONE HYDROCHLORIDE 20 MILLIGRAM(S): 5 TABLET ORAL at 17:00

## 2022-11-09 RX ADMIN — OXYCODONE HYDROCHLORIDE 20 MILLIGRAM(S): 5 TABLET ORAL at 09:30

## 2022-11-09 RX ADMIN — Medication 0.25 MILLIGRAM(S): at 07:09

## 2022-11-09 RX ADMIN — CHLORHEXIDINE GLUCONATE 1 APPLICATION(S): 213 SOLUTION TOPICAL at 06:52

## 2022-11-09 RX ADMIN — Medication 10 MILLIGRAM(S): at 06:50

## 2022-11-09 RX ADMIN — Medication 1 TABLET(S): at 12:57

## 2022-11-09 RX ADMIN — GABAPENTIN 300 MILLIGRAM(S): 400 CAPSULE ORAL at 22:08

## 2022-11-09 RX ADMIN — NAFCILLIN 200 GRAM(S): 10 INJECTION, POWDER, FOR SOLUTION INTRAVENOUS at 11:33

## 2022-11-09 RX ADMIN — ENOXAPARIN SODIUM 40 MILLIGRAM(S): 100 INJECTION SUBCUTANEOUS at 22:06

## 2022-11-09 RX ADMIN — LISINOPRIL 40 MILLIGRAM(S): 2.5 TABLET ORAL at 06:50

## 2022-11-09 RX ADMIN — SIMETHICONE 80 MILLIGRAM(S): 80 TABLET, CHEWABLE ORAL at 22:31

## 2022-11-09 RX ADMIN — NAFCILLIN 200 GRAM(S): 10 INJECTION, POWDER, FOR SOLUTION INTRAVENOUS at 02:18

## 2022-11-09 RX ADMIN — TIZANIDINE 2 MILLIGRAM(S): 4 TABLET ORAL at 06:52

## 2022-11-09 RX ADMIN — GABAPENTIN 300 MILLIGRAM(S): 400 CAPSULE ORAL at 06:50

## 2022-11-09 RX ADMIN — FAMOTIDINE 20 MILLIGRAM(S): 10 INJECTION INTRAVENOUS at 12:57

## 2022-11-09 RX ADMIN — GABAPENTIN 300 MILLIGRAM(S): 400 CAPSULE ORAL at 13:02

## 2022-11-09 RX ADMIN — OXYCODONE HYDROCHLORIDE 20 MILLIGRAM(S): 5 TABLET ORAL at 22:01

## 2022-11-09 RX ADMIN — CHLORHEXIDINE GLUCONATE 1 APPLICATION(S): 213 SOLUTION TOPICAL at 22:09

## 2022-11-09 RX ADMIN — TIZANIDINE 2 MILLIGRAM(S): 4 TABLET ORAL at 13:01

## 2022-11-09 NOTE — PROGRESS NOTE ADULT - SUBJECTIVE AND OBJECTIVE BOX
Patient participated in 40-minute, follow-up, supportive session. He indicates feeling overwhelmed by heightened stress associated with his current health issues, as well as additional psychosocial stressors which have emerged outside of his control involving a family member.

## 2022-11-09 NOTE — PROGRESS NOTE ADULT - ASSESSMENT
53 YO male with PMH of lumbar Spinal Stenosis, Kidney Stone, and chronic back pain due to sports injuries who presented to [facility] on 10/14 with c/o Right Sided Shoulder Pain and acute on chronic Lower back pain. Shoulder MRI with Mildly displaced anterior glenoid labrum extending from the three to 4:00 position with evidence of an ALPSA lesion. Soft tissue swelling and edema seen in the region of the distal clavicle involving the trapezius musculature. s/p Right shoulder subacromial injection 10/17/2022. Hospital course significant for sepsis, no on long term IV ABX. Patient now with gait Instability, ADL impairments and Functional impairments.    #Mildly displaced anterior glenoid labrum   #Lumbar Spinal Stenosis  - s/p Right shoulder subacromial injection 10/17/2022  - s/p L2-S1 laminectomy and washout on 10/22/22  - pain and bowel regimen as per rehab    #MSSA bacteremia due to epidural abscess and bilateral psoas abscess  - s/p wound washout   - repeat blood cx on 10/22 negative  - Naficillin 2G Q 4hrs till 12/3  - Left DL PICC line inserted on 10/30    #HTN  - lisinopril   - control pain    #DVT ppx  - Heparin    #GI ppx  - Pepcid     will follow  d/w dr. roth

## 2022-11-09 NOTE — PROGRESS NOTE ADULT - ASSESSMENT
Patient participates in session at bedside. He is alert and oriented x4. He is engaged and cooperative in session. Mood is anxious, with pressured affect and momentary tearfulness. Support and encouragement are provided. Cognitive-behavioral therapy approach is used to reframe perspective and encourage greater sense of control over his situation. Previously discussed mindfulness strategies are reinforced. Consulted with treatment team for coordination of care purposes. Plan: Follow-up sessions to continue. Neuropsychology remains available.

## 2022-11-09 NOTE — PROVIDER CONTACT NOTE (OTHER) - SITUATION
Pt c/o mild SOB, chest tightness, feeling anxious
c/o anxiety
Pt refusing to follow safety protocol's and is agitated

## 2022-11-09 NOTE — PROGRESS NOTE ADULT - SUBJECTIVE AND OBJECTIVE BOX
Patient is a 52y old  Male who presents with a chief complaint of epidural abscess and lumbar lami     SUBJECTIVE & REVIEW OF SYMPTOMS:  patient seen and evaluated while resting in bed  reports going through some hardship with mother's transfer to [facility] for acute evaluate of wounds/condition  Overwhelmed with social issues, pain, and constipation and feel like he's unable to move/fatigue.  PT also at bedside, said he did well yesterday (ambulated from gym and back into room/bed.    Given degree of anxiety, took xanax and encouraged to be open minded with neuropsych counseling   Abd discomfort is better but still present.  BM x 2 yesterday.    Pain with prolong sitting - tried tizanidine doesn't know if there's obvious benefits  Denies CP, SOB, HOOK, HA, dizziness, nausea, dysuria.  Has full control of bowel and bladder     Vital Signs Last 24 Hrs  T(C): 36.8 (11-08-22 @ 20:08), Max: 36.8 (11-08-22 @ 20:08)  T(F): 98.3 (11-08-22 @ 20:08), Max: 98.3 (11-08-22 @ 20:08)  HR: 99 (11-08-22 @ 20:08) (99 - 99)  BP: 133/80 (11-09-22 @ 06:42) (133/80 - 137/84)  RR: 16 (11-08-22 @ 20:08) (16 - 16)  SpO2: 96% (11-08-22 @ 20:08) (96% - 96%)    LAB                       12.4   5.41  )-----------( 458      ( 07 Nov 2022 07:10 )             38.7     11-07    139  |  103  |  14  ----------------------------<  122<H>  4.0   |  23  |  0.75    Ca    9.3      07 Nov 2022 07:10    TPro  7.8  /  Alb  2.8<L>  /  TBili  0.7  /  DBili  x   /  AST  25  /  ALT  39  /  AlkPhos  99  11-07    LIVER FUNCTIONS - ( 07 Nov 2022 07:10 )  Alb: 2.8 g/dL / Pro: 7.8 g/dL / ALK PHOS: 99 U/L / ALT: 39 U/L / AST: 25 U/L / GGT: x           < from: Xray Abdomen 2 Views (11.07.22 @ 15:47) >  There is diffuse increased fecal content throughout the colon but no sign   of obstruction. The sigmoid may be redundant.              MEDICATIONS  (STANDING):  baclofen 10 milliGRAM(s) Oral every 8 hours  chlorhexidine 2% Cloths 1 Application(s) Topical daily  enoxaparin Injectable 40 milliGRAM(s) SubCutaneous every 24 hours  famotidine    Tablet 20 milliGRAM(s) Oral daily  gabapentin 300 milliGRAM(s) Oral three times a day  lisinopril 40 milliGRAM(s) Oral daily  multivitamin 1 Tablet(s) Oral daily  nafcillin  IVPB 2 Gram(s) IV Intermittent every 4 hours  oxyCODONE  ER Tablet 20 milliGRAM(s) Oral <User Schedule>  polyethylene glycol 3350 17 Gram(s) Oral daily  saccharomyces boulardii 250 milliGRAM(s) Oral two times a day  senna 2 Tablet(s) Oral at bedtime  tiZANidine 2 milliGRAM(s) Oral three times a day    MEDICATIONS  (PRN):  acetaminophen     Tablet .. 650 milliGRAM(s) Oral every 6 hours PRN Temp greater or equal to 38C (100.4F), Mild Pain (1 - 3)  ALPRAZolam 0.25 milliGRAM(s) Oral two times a day PRN anxiety  oxyCODONE    IR 20 milliGRAM(s) Oral every 4 hours PRN Severe Pain (7 - 10)  simethicone 80 milliGRAM(s) Chew four times a day PRN Heartburn    physical exam:  Constitutional - NAD  Chest - resp non labored  Cardiovascular - warm and well perfused   Abdomen - mildly distended, tympanic throughout  Extremities - No C/C/E, No calf tenderness   Psychiatric - talking fast, anxiously

## 2022-11-09 NOTE — PROVIDER CONTACT NOTE (OTHER) - DATE AND TIME:
09-Nov-2022 11:49
02-Nov-2022 23:30
03-Nov-2022 08:14

## 2022-11-09 NOTE — CHART NOTE - NSCHARTNOTEFT_GEN_A_CORE
Nutrition Follow Up Note  Hospital Course   (Per Electronic Medical Record)    Source:  Patient [X]  Medical Record [X]      Diet:   Regular Diet (IDDSI Level 7) w/ Thin Liquids (IDDSI Level 0)  Tolerates Diet Consistency Well  No Chewing/Swallowing Difficulties  No Recent Nausea, Vomiting, Diarrhea or Constipation (as Per Patient)  Consumes % of Meals (as Per Documentation) - States Fair Intake (Per Patient)  on Ensure Plant Based 8oz PO BID (Provides 360kcal & 40grams of Protein)  Patient Takes Nutrition Supplement Well  Obtained Food Preferences from Patient    Enteral/Parenteral Nutrition: Not Applicable    Current Weight: 197.7lb on 11/6  Weights Currently Stable @This Time  Obtain Weights Weekly     Pertinent Medications: MEDICATIONS  (STANDING):  baclofen 10 milliGRAM(s) Oral every 8 hours  chlorhexidine 2% Cloths 1 Application(s) Topical daily  enoxaparin Injectable 40 milliGRAM(s) SubCutaneous every 24 hours  famotidine    Tablet 20 milliGRAM(s) Oral daily  gabapentin 300 milliGRAM(s) Oral three times a day  lisinopril 40 milliGRAM(s) Oral daily  multivitamin 1 Tablet(s) Oral daily  nafcillin  IVPB 2 Gram(s) IV Intermittent every 4 hours  oxyCODONE  ER Tablet 20 milliGRAM(s) Oral <User Schedule>  polyethylene glycol 3350 17 Gram(s) Oral daily  saccharomyces boulardii 250 milliGRAM(s) Oral two times a day  senna 2 Tablet(s) Oral at bedtime  tiZANidine 2 milliGRAM(s) Oral three times a day    MEDICATIONS  (PRN):  acetaminophen     Tablet .. 650 milliGRAM(s) Oral every 6 hours PRN Temp greater or equal to 38C (100.4F), Mild Pain (1 - 3)  ALPRAZolam 0.25 milliGRAM(s) Oral two times a day PRN anxiety  oxyCODONE    IR 20 milliGRAM(s) Oral every 4 hours PRN Severe Pain (7 - 10)  simethicone 80 milliGRAM(s) Chew four times a day PRN Heartburn    Pertinent Labs:  11-07 Na139 mmol/L Glu 122 mg/dL<H> K+ 4.0 mmol/L Cr  0.75 mg/dL BUN 14 mg/dL 11-07 Alb 2.8 g/dL<L>    Skin: No Pressure Ulcers  Surgical Incision on L-Spine  (as Per Nursing Flow Sheet)     Edema: None Noted (as Per Documentation)     Last Bowel Movement: on 11/7    Estimated Needs:   [X] No Change Since Previous Assessment    Previous Nutrition Diagnosis:   Severe Malnutrition     Nutrition Diagnosis is [X] Ongoing - Continues on Nutrition Supplement & Patient Takes Nutrition Supplement Well    New Nutrition Diagnosis: [X] Not Applicable    Interventions:   1. Recommend Continue Nutrition Plan of Care     Monitoring & Evaluation:   [X] Weights   [X] PO Intake   [X] Skin Integrity   [X] Follow Up (Per Protocol)  [X] Tolerance to Diet Prescription   [X] Other: Labs    Registered Dietitian/Nutritionist Remains Available.  Kali Ortega RDN    Phone# [phone]

## 2022-11-09 NOTE — PROGRESS NOTE ADULT - NS ATTEND AMEND GEN_ALL_CORE FT
physical exam:  Constitutional - NAD  Chest - resp non labored  Cardiovascular - warm and well perfused   Abdomen - mildly distended, tympanic throughout  Extremities - No C/C/E, No calf tenderness   Psychiatric -  anxiously  MS 4/5 throughout
Constitutional - NAD  Chest - breathing comfortably   Cardiovascular - warm and well perfused   Abdomen - mildly distended, tympanic throughout  Extremities - No C/C/E, No calf tenderness   Motor: 4-/5 bilateral lower extremities   Psychiatric - anxious     pain-add Tizanidine 2 mg TID   constipation- mobi prep given x-ray

## 2022-11-09 NOTE — PROGRESS NOTE ADULT - SUBJECTIVE AND OBJECTIVE BOX
52y old  Male who presents with a chief complaint of Lumbar Laminectomy     Patient seen and examined at bedside, noted to be very upset due to his moms health.  c/o pain in the right lower back, 4/10 at rest, aggravated to 7/10 with movements, radiates to right SI joint, feels slightly better than yesterday.   no fever, chills, cough, sob, cp, abd pain.    Vital Signs Last 24 Hrs  T(C): 36.8 (08 Nov 2022 20:08), Max: 36.8 (08 Nov 2022 20:08)  T(F): 98.3 (08 Nov 2022 20:08), Max: 98.3 (08 Nov 2022 20:08)  HR: 99 (08 Nov 2022 20:08) (99 - 99)  BP: 133/80 (09 Nov 2022 06:42) (133/80 - 137/84)  BP(mean): --  RR: 16 (08 Nov 2022 20:08) (16 - 16)  SpO2: 96% (08 Nov 2022 20:08) (96% - 96%)    Parameters below as of 08 Nov 2022 20:08  Patient On (Oxygen Delivery Method): room air      GENERAL- NAD  EAR/NOSE/MOUTH/THROAT - no pharyngeal exudates, no oral lesion's  MMM  EYES- MERRICK, conjunctiva and Sclera clear  NECK- supple  RESPIRATORY-  clear to auscultation bilaterally, non laboured breathing  CARDIOVASCULAR - SIS2, RRR  GI - soft NT BS present  EXTREMITIES- no pedal edema  NEUROLOGY- B/L LE WEAKNESS  PSYCHIATRY- AAO X 3      MEDICATIONS  (STANDING):  baclofen 10 milliGRAM(s) Oral every 8 hours  chlorhexidine 2% Cloths 1 Application(s) Topical daily  enoxaparin Injectable 40 milliGRAM(s) SubCutaneous every 24 hours  famotidine    Tablet 20 milliGRAM(s) Oral daily  gabapentin 300 milliGRAM(s) Oral three times a day  lisinopril 40 milliGRAM(s) Oral daily  multivitamin 1 Tablet(s) Oral daily  nafcillin  IVPB 2 Gram(s) IV Intermittent every 4 hours  oxyCODONE  ER Tablet 20 milliGRAM(s) Oral <User Schedule>  polyethylene glycol 3350 17 Gram(s) Oral daily  saccharomyces boulardii 250 milliGRAM(s) Oral two times a day  senna 2 Tablet(s) Oral at bedtime  tiZANidine 2 milliGRAM(s) Oral three times a day    MEDICATIONS  (PRN):  acetaminophen     Tablet .. 650 milliGRAM(s) Oral every 6 hours PRN Temp greater or equal to 38C (100.4F), Mild Pain (1 - 3)  ALPRAZolam 0.25 milliGRAM(s) Oral two times a day PRN anxiety  oxyCODONE    IR 20 milliGRAM(s) Oral every 4 hours PRN Severe Pain (7 - 10)  simethicone 80 milliGRAM(s) Chew four times a day PRN Heartburn

## 2022-11-09 NOTE — PROGRESS NOTE ADULT - ASSESSMENT
53 YO male with PMH of lumbar Spinal Stenosis, Kidney Stone, and chronic back pain admitted to rehab after recent hospitalisation for increased pain and finding of Bacteremia Blood cx 's + GPC MSSA , L2- S1 laminectomy and evacuation of epidural abscess and washout on 10/22.. s/p Right shoulder subacromial injection 10/17/2022.      Continue  Comprehensive Rehab Program: PT/OT, 3hours daily and 5 days weekly      #Sepsis, Lumbar epidural abscess MSSA bacteremia, - s/p L2-S1 laminectomy and washout on 10/22/22- Further chart review of ID notes from outside hospital also shows bilateral psoas abscess    - Naficillin 2G Q 4hrs till 12/3 + Probiotics  - Left  UE PICC line inserted on 10/30  - Weekly ESR, CRP, CMP, and CBC - ESR trending up a little. WIll monitor and ask ID to see pt if continues to rise   KD negative for vegetation   Abd/pelvis - no drainable abscess   Blood cultures 10/22 - NG     #HTN:  - lisinopril 40mg daily    #Pain management  - Tylenol PRN, Oxycodone 20 PRN, Oxycontin 20 BID, Neurontin 300 TID  - baclofen 10mg tid prn spasms   -Tizanidine 2mg TID  - May use TENS to right SI region prn     Mood: anxiety about current medical course and his mother's condition  Neuropsych consult appreciated  - Xanax 0.25mg bid prn      #DVT ppx  - Heparin TID -> Lovenox QD  - TEDs    #GI ppx  - Pepcid 20mg     #Bowel Regimen:  - Senna, miralax   - moviprep + enema (11/7)     #Bladder management:   - continent. voiding without difficulty    # Diet:   - Regular    #Skin:  - Skin on admission: Lumbar spine incision healing well - staples removed 11/8  - Pressure injury/Skin: Turn Q2hrs while in bed, OOB to Chair, PT/OT     Precaution: - Fall, Spinal  LSO when OOB with therapy  and activity ( Per ortho )     IDT 11/9  Functional: fluctuates between CG - mod A depending on mood.  Overall min A in OT and can probably do more (self limiting)  Disp: 11/15 home vs DORY

## 2022-11-09 NOTE — PROVIDER CONTACT NOTE (OTHER) - ACTION/TREATMENT ORDERED:
No new order at this time.
Nebulizer treatment, chest x-ray, incentive spirometer
Pt re-educated to proper spinal precaution

## 2022-11-09 NOTE — PROVIDER CONTACT NOTE (OTHER) - ASSESSMENT
Pt S/P ERWIN. Received pt in bed on the phone with family. Pt appeared agitated.  threw his urinal and started banging on the side of the bed. Later in the day the Pt was toileted by PCA. Walker used to get pt on toilet. When it was time to get pt off toilet PCA asked for help from nurse. Pt was again agitated and pushed walker way refusing to use it. He stated "Im smarter then everyone on this unit, I'm not using that walker". Insisted he wanted to twist to reach the side rail to pull himself up. Pt advised that the twisting he would have to do to reach the side bar would break spinal precaution. PT Gilda overheard conversation and came in to bathroom assist  with transfer.
Pt states feeling anxious, mild SOB when talking, c/o chest tightness, wheezing noted upon auscultation on bilateral lower lobes, more apparent on right side. Vital signs stable.
Alert and anxious, did not sleep well. On pain meds with some relief.

## 2022-11-10 LAB
ALBUMIN SERPL ELPH-MCNC: 2.4 G/DL — LOW (ref 3.3–5)
ALP SERPL-CCNC: 89 U/L — SIGNIFICANT CHANGE UP (ref 40–120)
ALT FLD-CCNC: 27 U/L — SIGNIFICANT CHANGE UP (ref 10–45)
ANION GAP SERPL CALC-SCNC: 12 MMOL/L — SIGNIFICANT CHANGE UP (ref 5–17)
AST SERPL-CCNC: 23 U/L — SIGNIFICANT CHANGE UP (ref 10–40)
BILIRUB SERPL-MCNC: 0.8 MG/DL — SIGNIFICANT CHANGE UP (ref 0.2–1.2)
BUN SERPL-MCNC: 13 MG/DL — SIGNIFICANT CHANGE UP (ref 7–23)
CALCIUM SERPL-MCNC: 9.1 MG/DL — SIGNIFICANT CHANGE UP (ref 8.4–10.5)
CHLORIDE SERPL-SCNC: 107 MMOL/L — SIGNIFICANT CHANGE UP (ref 96–108)
CO2 SERPL-SCNC: 23 MMOL/L — SIGNIFICANT CHANGE UP (ref 22–31)
CREAT SERPL-MCNC: 0.76 MG/DL — SIGNIFICANT CHANGE UP (ref 0.5–1.3)
EGFR: 108 ML/MIN/1.73M2 — SIGNIFICANT CHANGE UP
GLUCOSE SERPL-MCNC: 95 MG/DL — SIGNIFICANT CHANGE UP (ref 70–99)
HCT VFR BLD CALC: 34.9 % — LOW (ref 39–50)
HGB BLD-MCNC: 11.2 G/DL — LOW (ref 13–17)
MCHC RBC-ENTMCNC: 31 PG — SIGNIFICANT CHANGE UP (ref 27–34)
MCHC RBC-ENTMCNC: 32.1 GM/DL — SIGNIFICANT CHANGE UP (ref 32–36)
MCV RBC AUTO: 96.7 FL — SIGNIFICANT CHANGE UP (ref 80–100)
NRBC # BLD: 0 /100 WBCS — SIGNIFICANT CHANGE UP (ref 0–0)
PLATELET # BLD AUTO: 407 K/UL — HIGH (ref 150–400)
POTASSIUM SERPL-MCNC: 3.6 MMOL/L — SIGNIFICANT CHANGE UP (ref 3.5–5.3)
POTASSIUM SERPL-SCNC: 3.6 MMOL/L — SIGNIFICANT CHANGE UP (ref 3.5–5.3)
PROT SERPL-MCNC: 6.3 G/DL — SIGNIFICANT CHANGE UP (ref 6–8.3)
RBC # BLD: 3.61 M/UL — LOW (ref 4.2–5.8)
RBC # FLD: 13.4 % — SIGNIFICANT CHANGE UP (ref 10.3–14.5)
SODIUM SERPL-SCNC: 142 MMOL/L — SIGNIFICANT CHANGE UP (ref 135–145)
WBC # BLD: 4.83 K/UL — SIGNIFICANT CHANGE UP (ref 3.8–10.5)
WBC # FLD AUTO: 4.83 K/UL — SIGNIFICANT CHANGE UP (ref 3.8–10.5)

## 2022-11-10 PROCEDURE — 99232 SBSQ HOSP IP/OBS MODERATE 35: CPT

## 2022-11-10 PROCEDURE — 99233 SBSQ HOSP IP/OBS HIGH 50: CPT

## 2022-11-10 RX ORDER — OXYCODONE HYDROCHLORIDE 5 MG/1
15 TABLET ORAL EVERY 4 HOURS
Refills: 0 | Status: DISCONTINUED | OUTPATIENT
Start: 2022-11-10 | End: 2022-11-17

## 2022-11-10 RX ADMIN — Medication 0.25 MILLIGRAM(S): at 17:07

## 2022-11-10 RX ADMIN — FAMOTIDINE 20 MILLIGRAM(S): 10 INJECTION INTRAVENOUS at 11:43

## 2022-11-10 RX ADMIN — POLYETHYLENE GLYCOL 3350 17 GRAM(S): 17 POWDER, FOR SOLUTION ORAL at 11:43

## 2022-11-10 RX ADMIN — GABAPENTIN 300 MILLIGRAM(S): 400 CAPSULE ORAL at 14:39

## 2022-11-10 RX ADMIN — Medication 250 MILLIGRAM(S): at 05:54

## 2022-11-10 RX ADMIN — OXYCODONE HYDROCHLORIDE 15 MILLIGRAM(S): 5 TABLET ORAL at 18:31

## 2022-11-10 RX ADMIN — Medication 10 MILLIGRAM(S): at 21:17

## 2022-11-10 RX ADMIN — OXYCODONE HYDROCHLORIDE 20 MILLIGRAM(S): 5 TABLET ORAL at 20:45

## 2022-11-10 RX ADMIN — NAFCILLIN 200 GRAM(S): 10 INJECTION, POWDER, FOR SOLUTION INTRAVENOUS at 02:13

## 2022-11-10 RX ADMIN — OXYCODONE HYDROCHLORIDE 20 MILLIGRAM(S): 5 TABLET ORAL at 09:41

## 2022-11-10 RX ADMIN — SENNA PLUS 2 TABLET(S): 8.6 TABLET ORAL at 21:17

## 2022-11-10 RX ADMIN — GABAPENTIN 300 MILLIGRAM(S): 400 CAPSULE ORAL at 21:18

## 2022-11-10 RX ADMIN — NAFCILLIN 200 GRAM(S): 10 INJECTION, POWDER, FOR SOLUTION INTRAVENOUS at 05:53

## 2022-11-10 RX ADMIN — OXYCODONE HYDROCHLORIDE 15 MILLIGRAM(S): 5 TABLET ORAL at 14:42

## 2022-11-10 RX ADMIN — TIZANIDINE 2 MILLIGRAM(S): 4 TABLET ORAL at 14:39

## 2022-11-10 RX ADMIN — NAFCILLIN 200 GRAM(S): 10 INJECTION, POWDER, FOR SOLUTION INTRAVENOUS at 18:25

## 2022-11-10 RX ADMIN — GABAPENTIN 300 MILLIGRAM(S): 400 CAPSULE ORAL at 05:54

## 2022-11-10 RX ADMIN — NAFCILLIN 200 GRAM(S): 10 INJECTION, POWDER, FOR SOLUTION INTRAVENOUS at 21:18

## 2022-11-10 RX ADMIN — OXYCODONE HYDROCHLORIDE 20 MILLIGRAM(S): 5 TABLET ORAL at 20:46

## 2022-11-10 RX ADMIN — Medication 1 TABLET(S): at 11:43

## 2022-11-10 RX ADMIN — NAFCILLIN 200 GRAM(S): 10 INJECTION, POWDER, FOR SOLUTION INTRAVENOUS at 14:40

## 2022-11-10 RX ADMIN — Medication 10 MILLIGRAM(S): at 14:39

## 2022-11-10 RX ADMIN — OXYCODONE HYDROCHLORIDE 20 MILLIGRAM(S): 5 TABLET ORAL at 08:11

## 2022-11-10 RX ADMIN — Medication 10 MILLIGRAM(S): at 05:54

## 2022-11-10 RX ADMIN — Medication 250 MILLIGRAM(S): at 17:05

## 2022-11-10 RX ADMIN — CHLORHEXIDINE GLUCONATE 1 APPLICATION(S): 213 SOLUTION TOPICAL at 21:13

## 2022-11-10 RX ADMIN — TIZANIDINE 2 MILLIGRAM(S): 4 TABLET ORAL at 05:54

## 2022-11-10 RX ADMIN — Medication 0.25 MILLIGRAM(S): at 08:16

## 2022-11-10 RX ADMIN — TIZANIDINE 2 MILLIGRAM(S): 4 TABLET ORAL at 21:17

## 2022-11-10 RX ADMIN — LISINOPRIL 40 MILLIGRAM(S): 2.5 TABLET ORAL at 05:54

## 2022-11-10 RX ADMIN — ENOXAPARIN SODIUM 40 MILLIGRAM(S): 100 INJECTION SUBCUTANEOUS at 21:17

## 2022-11-10 RX ADMIN — OXYCODONE HYDROCHLORIDE 20 MILLIGRAM(S): 5 TABLET ORAL at 20:05

## 2022-11-10 RX ADMIN — NAFCILLIN 200 GRAM(S): 10 INJECTION, POWDER, FOR SOLUTION INTRAVENOUS at 11:39

## 2022-11-10 NOTE — PROGRESS NOTE ADULT - SUBJECTIVE AND OBJECTIVE BOX
52y old  Male who presents with a chief complaint of Lumbar Laminectomy     Patient seen and examined at bedside, feel better today,   c/o pain in the right lower back, 2/10 at rest, aggravated to 5-6/10 with movements, radiates to right SI joint, feels slightly better than yesterday.   no fever, chills, cough, sob, cp, abd pain.    Vital Signs Last 24 Hrs  T(C): 36.9 (10 Nov 2022 08:18), Max: 36.9 (09 Nov 2022 21:08)  T(F): 98.5 (10 Nov 2022 08:18), Max: 98.5 (09 Nov 2022 21:08)  HR: 89 (10 Nov 2022 08:18) (76 - 98)  BP: 137/85 (10 Nov 2022 08:18) (137/85 - 147/89)  BP(mean): --  RR: 15 (10 Nov 2022 08:18) (15 - 16)  SpO2: 96% (10 Nov 2022 08:18) (96% - 97%)    Parameters below as of 10 Nov 2022 01:32  Patient On (Oxygen Delivery Method): room air      GENERAL- NAD  EAR/NOSE/MOUTH/THROAT - no pharyngeal exudates, no oral lesion's  MMM  EYES- MERRICK, conjunctiva and Sclera clear  NECK- supple  RESPIRATORY-  clear to auscultation bilaterally, non laboured breathing  CARDIOVASCULAR - SIS2, RRR  GI - soft NT BS present  EXTREMITIES- no pedal edema  NEUROLOGY- B/L LE WEAKNESS  PSYCHIATRY- AAO X 3                11.2                 142  | 23   | 13           4.83  >-----------< 407     ------------------------< 95                    34.9                 3.6  | 107  | 0.76                                         Ca 9.1   Mg x     Ph x        MEDICATIONS  (STANDING):  baclofen 10 milliGRAM(s) Oral every 8 hours  chlorhexidine 2% Cloths 1 Application(s) Topical daily  enoxaparin Injectable 40 milliGRAM(s) SubCutaneous every 24 hours  famotidine    Tablet 20 milliGRAM(s) Oral daily  gabapentin 300 milliGRAM(s) Oral three times a day  lisinopril 40 milliGRAM(s) Oral daily  multivitamin 1 Tablet(s) Oral daily  nafcillin  IVPB 2 Gram(s) IV Intermittent every 4 hours  oxyCODONE  ER Tablet 20 milliGRAM(s) Oral <User Schedule>  polyethylene glycol 3350 17 Gram(s) Oral daily  saccharomyces boulardii 250 milliGRAM(s) Oral two times a day  senna 2 Tablet(s) Oral at bedtime  tiZANidine 2 milliGRAM(s) Oral three times a day    MEDICATIONS  (PRN):  acetaminophen     Tablet .. 650 milliGRAM(s) Oral every 6 hours PRN Temp greater or equal to 38C (100.4F), Mild Pain (1 - 3)  ALPRAZolam 0.25 milliGRAM(s) Oral two times a day PRN anxiety  oxyCODONE    IR 20 milliGRAM(s) Oral every 4 hours PRN Severe Pain (7 - 10)  oxyCODONE    IR 15 milliGRAM(s) Oral every 4 hours PRN Moderate Pain (4 - 6)  simethicone 80 milliGRAM(s) Chew four times a day PRN Heartburn

## 2022-11-10 NOTE — PROGRESS NOTE ADULT - ASSESSMENT
53 YO male with PMH of lumbar Spinal Stenosis, Kidney Stone, and chronic back pain due to sports injuries who presented to [facility] on 10/14 with c/o Right Sided Shoulder Pain and acute on chronic Lower back pain. Shoulder MRI with Mildly displaced anterior glenoid labrum extending from the three to 4:00 position with evidence of an ALPSA lesion. Soft tissue swelling and edema seen in the region of the distal clavicle involving the trapezius musculature. s/p Right shoulder subacromial injection 10/17/2022. Hospital course significant for sepsis, no on long term IV ABX. Patient now with gait Instability, ADL impairments and Functional impairments.    #Mildly displaced anterior glenoid labrum   #Lumbar Spinal Stenosis  - s/p Right shoulder subacromial injection 10/17/2022  - s/p L2-S1 laminectomy and washout on 10/22/22    #MSSA bacteremia due to epidural abscess and bilateral psoas abscess  - s/p wound washout   - repeat blood cx on 10/22 negative  - Naficillin 2G Q 4hrs till 12/3  - Left DL PICC line inserted on 10/30    #HTN  - lisinopril   - control pain    #DVT ppx  - Heparin    #GI ppx  - Pepcid     will follow  d/w dr. roth

## 2022-11-10 NOTE — PROGRESS NOTE ADULT - SUBJECTIVE AND OBJECTIVE BOX
Patient is a 52y old  Male who presents with a chief complaint of epidural abscess and lumbar lami     SUBJECTIVE & REVIEW OF SYMPTOMS:  patient seen and evaluated   reports going through some hardship with mother's transfer to [facility] for acute evaluate of wounds/condition  Overwhelmed with social issues, pain, and constipation and feel like he's unable to move/fatigue.  PT also at bedside, said he did well yesterday (ambulated from gym and back into room/bed.    Given degree of anxiety, took xanax and encouraged to be open minded with neuropsych counseling   Abd discomfort is better but still present.  BM x 2 yesterday.    Pain with prolong sitting - tried tizanidine doesn't know if there's obvious benefits  Denies CP, SOB, HOOK, HA, dizziness, nausea, dysuria.  Has full control of bowel and bladder     Vital Signs Last 24 Hrs  T(C): 36.8 (11-08-22 @ 20:08), Max: 36.8 (11-08-22 @ 20:08)  T(F): 98.3 (11-08-22 @ 20:08), Max: 98.3 (11-08-22 @ 20:08)  HR: 99 (11-08-22 @ 20:08) (99 - 99)  BP: 133/80 (11-09-22 @ 06:42) (133/80 - 137/84)  RR: 16 (11-08-22 @ 20:08) (16 - 16)  SpO2: 96% (11-08-22 @ 20:08) (96% - 96%)    LAB                       12.4   5.41  )-----------( 458      ( 07 Nov 2022 07:10 )             38.7     11-07    139  |  103  |  14  ----------------------------<  122<H>  4.0   |  23  |  0.75    Ca    9.3      07 Nov 2022 07:10    TPro  7.8  /  Alb  2.8<L>  /  TBili  0.7  /  DBili  x   /  AST  25  /  ALT  39  /  AlkPhos  99  11-07    LIVER FUNCTIONS - ( 07 Nov 2022 07:10 )  Alb: 2.8 g/dL / Pro: 7.8 g/dL / ALK PHOS: 99 U/L / ALT: 39 U/L / AST: 25 U/L / GGT: x           < from: Xray Abdomen 2 Views (11.07.22 @ 15:47) >  There is diffuse increased fecal content throughout the colon but no sign   of obstruction. The sigmoid may be redundant.              MEDICATIONS  (STANDING):  baclofen 10 milliGRAM(s) Oral every 8 hours  chlorhexidine 2% Cloths 1 Application(s) Topical daily  enoxaparin Injectable 40 milliGRAM(s) SubCutaneous every 24 hours  famotidine    Tablet 20 milliGRAM(s) Oral daily  gabapentin 300 milliGRAM(s) Oral three times a day  lisinopril 40 milliGRAM(s) Oral daily  multivitamin 1 Tablet(s) Oral daily  nafcillin  IVPB 2 Gram(s) IV Intermittent every 4 hours  oxyCODONE  ER Tablet 20 milliGRAM(s) Oral <User Schedule>  polyethylene glycol 3350 17 Gram(s) Oral daily  saccharomyces boulardii 250 milliGRAM(s) Oral two times a day  senna 2 Tablet(s) Oral at bedtime  tiZANidine 2 milliGRAM(s) Oral three times a day    MEDICATIONS  (PRN):  acetaminophen     Tablet .. 650 milliGRAM(s) Oral every 6 hours PRN Temp greater or equal to 38C (100.4F), Mild Pain (1 - 3)  ALPRAZolam 0.25 milliGRAM(s) Oral two times a day PRN anxiety  oxyCODONE    IR 20 milliGRAM(s) Oral every 4 hours PRN Severe Pain (7 - 10)  simethicone 80 milliGRAM(s) Chew four times a day PRN Heartburn    physical exam:  Constitutional - NAD  Chest - resp non labored  Cardiovascular - warm and well perfused   Abdomen - mildly distended, tympanic throughout  Extremities - No C/C/E, No calf tenderness   Psychiatric - talking fast, anxiously     Patient is a 52y old  Male who presents with a chief complaint of epidural abscess and lumbar lami     SUBJECTIVE & REVIEW OF SYMPTOMS:  patient seen and evaluated while sitting up in WC at bedside  tolerating therapy as best as he can - he has spontaneous spasms that feel debilitating.  reviewed medication regimen - patient is trying to wean opioids.  Will add lower oxycodone dose to PRN.  Using Tizanidine, uncertain of benefits.  Had BM today, overall abdomen feels better than previous  Denies CP, SOB, HOOK, HA, dizziness, nausea, dysuria.  Has full control of bowel and bladder   Pt verbalized difficulty with going home on IV abx that needs to be administered q4hrs  Will have  if he's eligible for DORY given his current functional status    Vital Signs Last 24 Hrs  T(C): 36.9 (11-10-22 @ 08:18), Max: 36.9 (11-09-22 @ 21:08)  T(F): 98.5 (11-10-22 @ 08:18), Max: 98.5 (11-09-22 @ 21:08)  HR: 89 (11-10-22 @ 08:18) (76 - 98)  BP: 137/85 (11-10-22 @ 08:18) (137/85 - 147/89)  RR: 15 (11-10-22 @ 08:18) (15 - 16)  SpO2: 96% (11-10-22 @ 08:18) (96% - 97%)      LAB                        11.2   4.83  )-----------( 407      ( 10 Nov 2022 06:34 )             34.9     11-10    142  |  107  |  13  ----------------------------<  95  3.6   |  23  |  0.76    Ca    9.1      10 Nov 2022 06:34    TPro  6.3  /  Alb  2.4<L>  /  TBili  0.8  /  DBili  x   /  AST  23  /  ALT  27  /  AlkPhos  89  11-10    LIVER FUNCTIONS - ( 10 Nov 2022 06:34 )  Alb: 2.4 g/dL / Pro: 6.3 g/dL / ALK PHOS: 89 U/L / ALT: 27 U/L / AST: 23 U/L / GGT: x           < from: Xray Abdomen 2 Views (11.07.22 @ 15:47) >  There is diffuse increased fecal content throughout the colon but no sign   of obstruction. The sigmoid may be redundant.            MEDICATIONS  (STANDING):  baclofen 10 milliGRAM(s) Oral every 8 hours  chlorhexidine 2% Cloths 1 Application(s) Topical daily  enoxaparin Injectable 40 milliGRAM(s) SubCutaneous every 24 hours  famotidine    Tablet 20 milliGRAM(s) Oral daily  gabapentin 300 milliGRAM(s) Oral three times a day  lisinopril 40 milliGRAM(s) Oral daily  multivitamin 1 Tablet(s) Oral daily  nafcillin  IVPB 2 Gram(s) IV Intermittent every 4 hours  oxyCODONE  ER Tablet 20 milliGRAM(s) Oral <User Schedule>  polyethylene glycol 3350 17 Gram(s) Oral daily  saccharomyces boulardii 250 milliGRAM(s) Oral two times a day  senna 2 Tablet(s) Oral at bedtime  tiZANidine 2 milliGRAM(s) Oral three times a day    MEDICATIONS  (PRN):  acetaminophen     Tablet .. 650 milliGRAM(s) Oral every 6 hours PRN Temp greater or equal to 38C (100.4F), Mild Pain (1 - 3)  ALPRAZolam 0.25 milliGRAM(s) Oral two times a day PRN anxiety  oxyCODONE    IR 20 milliGRAM(s) Oral every 4 hours PRN Severe Pain (7 - 10)  oxyCODONE    IR 15 milliGRAM(s) Oral every 4 hours PRN Moderate Pain (4 - 6)  simethicone 80 milliGRAM(s) Chew four times a day PRN Heartburn      physical exam:  Constitutional - NAD  Chest - resp non labored  Cardiovascular - warm and well perfused   Abdomen - mildly distended, tympanic throughout  Extremities - No C/C/E, No calf tenderness   Psychiatric - mildly anxious

## 2022-11-10 NOTE — PROGRESS NOTE ADULT - ASSESSMENT
53 YO male with PMH of lumbar Spinal Stenosis, Kidney Stone, and chronic back pain admitted to rehab after recent hospitalisation for increased pain and finding of Bacteremia Blood cx 's + GPC MSSA , L2- S1 laminectomy and evacuation of epidural abscess and washout on 10/22.. s/p Right shoulder subacromial injection 10/17/2022.      Continue  Comprehensive Rehab Program: PT/OT, 3hours daily and 5 days weekly      #Sepsis, Lumbar epidural abscess MSSA bacteremia, - s/p L2-S1 laminectomy and washout on 10/22/22- Further chart review of ID notes from outside hospital also shows bilateral psoas abscess    - Naficillin 2G Q 4hrs till 12/3 + Probiotics  - Left  UE PICC line inserted on 10/30  - Weekly ESR, CRP, CMP, and CBC - ESR trending up a little. WIll monitor and ask ID to see pt if continues to rise   KD negative for vegetation   Abd/pelvis - no drainable abscess   Blood cultures 10/22 - NG     #HTN:  - lisinopril 40mg daily    #Pain management  - Tylenol PRN, Oxycodone 20 PRN, Oxycontin 20 BID, Neurontin 300 TID  - baclofen 10mg tid prn spasms   -Tizanidine 2mg TID  - May use TENS to right SI region prn     Mood: anxiety about current medical course and his mother's condition  Neuropsych consult appreciated  - Xanax 0.25mg bid prn      #DVT ppx  - Heparin TID -> Lovenox QD  - TEDs    #GI ppx  - Pepcid 20mg     #Bowel Regimen:  - Senna, miralax   - moviprep + enema (11/7)     #Bladder management:   - continent. voiding without difficulty    # Diet:   - Regular    #Skin:  - Skin on admission: Lumbar spine incision healing well - staples removed 11/8  - Pressure injury/Skin: Turn Q2hrs while in bed, OOB to Chair, PT/OT     Precaution: - Fall, Spinal  LSO when OOB with therapy  and activity ( Per ortho )     IDT 11/9  Functional: fluctuates between CG - mod A depending on mood.  Overall min A in OT and can probably do more (self limiting)  Disp: 11/15 home vs DORY 53 YO male with PMH of lumbar Spinal Stenosis, Kidney Stone, and chronic back pain admitted to rehab after recent hospitalisation for increased pain and finding of Bacteremia Blood cx 's + GPC MSSA , L2- S1 laminectomy and evacuation of epidural abscess and washout on 10/22.. s/p Right shoulder subacromial injection 10/17/2022.      Continue  Comprehensive Rehab Program: PT/OT, 3hours daily and 5 days weekly      #Sepsis, Lumbar epidural abscess MSSA bacteremia, - s/p L2-S1 laminectomy and washout on 10/22/22- Further chart review of ID notes from outside hospital also shows bilateral psoas abscess    - Naficillin 2G Q 4hrs till 12/3 + Probiotics  - Left  UE PICC line inserted on 10/30  - Weekly ESR, CRP, CMP, and CBC - CRP 15 (11/7),  (11/7). WIll monitor and ask ID to see pt if continues to rise   KD negative for vegetation   Abd/pelvis - no drainable abscess   Blood cultures 10/22 - NG     #HTN:  - lisinopril 40mg daily    #Pain management  - Tylenol PRN, Oxycodone 15-20 PRN, Oxycontin 20 BID, Neurontin 300 TID  - baclofen 10mg tid prn spasms   - Tizanidine 2mg TID  - May use TENS to right SI region prn     Mood: anxiety about current medical course and his mother's condition  Neuropsych consult appreciated  - Xanax 0.25mg bid prn      #DVT ppx  - Heparin TID -> Lovenox QD  - TEDs    #GI ppx  - Pepcid 20mg     #Bowel Regimen:  - Senna, miralax   - moviprep + enema (11/7)     #Bladder management:   - continent. voiding without difficulty    # Diet:   - Regular    #Skin:  - Skin on admission: Lumbar spine incision healing well - staples removed 11/8  - Pressure injury/Skin: Turn Q2hrs while in bed, OOB to Chair, PT/OT     Precaution: - Fall, Spinal  LSO when OOB with therapy  and activity ( Per ortho )     IDT 11/9  Functional: fluctuates between CG - mod A depending on mood.  Overall min A in OT and can probably do more (self limiting)  Disp: 11/15 home vs DORY

## 2022-11-11 PROCEDURE — 99232 SBSQ HOSP IP/OBS MODERATE 35: CPT

## 2022-11-11 RX ADMIN — Medication 250 MILLIGRAM(S): at 17:24

## 2022-11-11 RX ADMIN — GABAPENTIN 300 MILLIGRAM(S): 400 CAPSULE ORAL at 14:33

## 2022-11-11 RX ADMIN — NAFCILLIN 200 GRAM(S): 10 INJECTION, POWDER, FOR SOLUTION INTRAVENOUS at 14:30

## 2022-11-11 RX ADMIN — OXYCODONE HYDROCHLORIDE 20 MILLIGRAM(S): 5 TABLET ORAL at 13:59

## 2022-11-11 RX ADMIN — NAFCILLIN 200 GRAM(S): 10 INJECTION, POWDER, FOR SOLUTION INTRAVENOUS at 11:00

## 2022-11-11 RX ADMIN — OXYCODONE HYDROCHLORIDE 20 MILLIGRAM(S): 5 TABLET ORAL at 04:21

## 2022-11-11 RX ADMIN — OXYCODONE HYDROCHLORIDE 20 MILLIGRAM(S): 5 TABLET ORAL at 20:30

## 2022-11-11 RX ADMIN — TIZANIDINE 2 MILLIGRAM(S): 4 TABLET ORAL at 14:34

## 2022-11-11 RX ADMIN — Medication 10 MILLIGRAM(S): at 22:43

## 2022-11-11 RX ADMIN — OXYCODONE HYDROCHLORIDE 15 MILLIGRAM(S): 5 TABLET ORAL at 17:27

## 2022-11-11 RX ADMIN — ENOXAPARIN SODIUM 40 MILLIGRAM(S): 100 INJECTION SUBCUTANEOUS at 22:42

## 2022-11-11 RX ADMIN — SENNA PLUS 2 TABLET(S): 8.6 TABLET ORAL at 22:42

## 2022-11-11 RX ADMIN — GABAPENTIN 300 MILLIGRAM(S): 400 CAPSULE ORAL at 22:43

## 2022-11-11 RX ADMIN — OXYCODONE HYDROCHLORIDE 20 MILLIGRAM(S): 5 TABLET ORAL at 08:54

## 2022-11-11 RX ADMIN — GABAPENTIN 300 MILLIGRAM(S): 400 CAPSULE ORAL at 05:55

## 2022-11-11 RX ADMIN — Medication 250 MILLIGRAM(S): at 05:52

## 2022-11-11 RX ADMIN — NAFCILLIN 200 GRAM(S): 10 INJECTION, POWDER, FOR SOLUTION INTRAVENOUS at 22:43

## 2022-11-11 RX ADMIN — OXYCODONE HYDROCHLORIDE 20 MILLIGRAM(S): 5 TABLET ORAL at 13:09

## 2022-11-11 RX ADMIN — TIZANIDINE 2 MILLIGRAM(S): 4 TABLET ORAL at 22:42

## 2022-11-11 RX ADMIN — LISINOPRIL 40 MILLIGRAM(S): 2.5 TABLET ORAL at 05:52

## 2022-11-11 RX ADMIN — OXYCODONE HYDROCHLORIDE 20 MILLIGRAM(S): 5 TABLET ORAL at 08:55

## 2022-11-11 RX ADMIN — NAFCILLIN 200 GRAM(S): 10 INJECTION, POWDER, FOR SOLUTION INTRAVENOUS at 18:30

## 2022-11-11 RX ADMIN — Medication 10 MILLIGRAM(S): at 14:34

## 2022-11-11 RX ADMIN — Medication 0.25 MILLIGRAM(S): at 17:26

## 2022-11-11 RX ADMIN — OXYCODONE HYDROCHLORIDE 20 MILLIGRAM(S): 5 TABLET ORAL at 21:11

## 2022-11-11 RX ADMIN — POLYETHYLENE GLYCOL 3350 17 GRAM(S): 17 POWDER, FOR SOLUTION ORAL at 11:57

## 2022-11-11 RX ADMIN — NAFCILLIN 200 GRAM(S): 10 INJECTION, POWDER, FOR SOLUTION INTRAVENOUS at 05:54

## 2022-11-11 RX ADMIN — Medication 10 MILLIGRAM(S): at 05:51

## 2022-11-11 RX ADMIN — Medication 1 TABLET(S): at 11:58

## 2022-11-11 RX ADMIN — FAMOTIDINE 20 MILLIGRAM(S): 10 INJECTION INTRAVENOUS at 11:58

## 2022-11-11 RX ADMIN — Medication 0.25 MILLIGRAM(S): at 08:55

## 2022-11-11 RX ADMIN — NAFCILLIN 200 GRAM(S): 10 INJECTION, POWDER, FOR SOLUTION INTRAVENOUS at 02:15

## 2022-11-11 RX ADMIN — TIZANIDINE 2 MILLIGRAM(S): 4 TABLET ORAL at 05:53

## 2022-11-11 NOTE — PROGRESS NOTE ADULT - SUBJECTIVE AND OBJECTIVE BOX
Patient is a 52y old  Male who presents with a chief complaint of epidural abscess and lumbar lami     SUBJECTIVE & REVIEW OF SYMPTOMS:  patient seen and evaluated at bedside  tolerating therapy Had BM today, overall abdomen feels better than previous  Denies CP, SOB, HOOK, HA, dizziness, nausea, dysuria.  Has full control of bowel and bladder   Pt verbalized difficulty with going home on IV abx that needs to be administered q4hrs      Vital Signs Last 24 Hrs  T(C): 36.7 (11 Nov 2022 07:53), Max: 36.9 (10 Nov 2022 20:57)  T(F): 98.1 (11 Nov 2022 07:53), Max: 98.4 (10 Nov 2022 20:57)  HR: 79 (11 Nov 2022 07:53) (77 - 88)  BP: 149/95 (11 Nov 2022 07:53) (128/87 - 149/95)  BP(mean): --  RR: 15 (11 Nov 2022 07:53) (15 - 16)  SpO2: 97% (11 Nov 2022 07:53) (94% - 97%)    Parameters below as of 11 Nov 2022 06:28  Patient On (Oxygen Delivery Method): room air          LAB                        11.2   4.83  )-----------( 407      ( 10 Nov 2022 06:34 )             34.9     11-10    142  |  107  |  13  ----------------------------<  95  3.6   |  23  |  0.76    Ca    9.1      10 Nov 2022 06:34    TPro  6.3  /  Alb  2.4<L>  /  TBili  0.8  /  DBili  x   /  AST  23  /  ALT  27  /  AlkPhos  89  11-10    LIVER FUNCTIONS - ( 10 Nov 2022 06:34 )  Alb: 2.4 g/dL / Pro: 6.3 g/dL / ALK PHOS: 89 U/L / ALT: 27 U/L / AST: 23 U/L / GGT: x           < from: Xray Abdomen 2 Views (11.07.22 @ 15:47) >  There is diffuse increased fecal content throughout the colon but no sign   of obstruction. The sigmoid may be redundant.            MEDICATIONS  (STANDING):  baclofen 10 milliGRAM(s) Oral every 8 hours  chlorhexidine 2% Cloths 1 Application(s) Topical daily  enoxaparin Injectable 40 milliGRAM(s) SubCutaneous every 24 hours  famotidine    Tablet 20 milliGRAM(s) Oral daily  gabapentin 300 milliGRAM(s) Oral three times a day  lisinopril 40 milliGRAM(s) Oral daily  multivitamin 1 Tablet(s) Oral daily  nafcillin  IVPB 2 Gram(s) IV Intermittent every 4 hours  oxyCODONE  ER Tablet 20 milliGRAM(s) Oral <User Schedule>  polyethylene glycol 3350 17 Gram(s) Oral daily  saccharomyces boulardii 250 milliGRAM(s) Oral two times a day  senna 2 Tablet(s) Oral at bedtime  tiZANidine 2 milliGRAM(s) Oral three times a day    MEDICATIONS  (PRN):  acetaminophen     Tablet .. 650 milliGRAM(s) Oral every 6 hours PRN Temp greater or equal to 38C (100.4F), Mild Pain (1 - 3)  ALPRAZolam 0.25 milliGRAM(s) Oral two times a day PRN anxiety  oxyCODONE    IR 20 milliGRAM(s) Oral every 4 hours PRN Severe Pain (7 - 10)  oxyCODONE    IR 15 milliGRAM(s) Oral every 4 hours PRN Moderate Pain (4 - 6)  simethicone 80 milliGRAM(s) Chew four times a day PRN Heartburn      physical exam:  Constitutional - NAD  Chest - resp non labored  Cardiovascular - warm and well perfused   Abdomen - mildly distended, tympanic throughout  Extremities - No C/C/E, No calf tenderness   Psychiatric - mildly anxious

## 2022-11-11 NOTE — PROGRESS NOTE ADULT - ASSESSMENT
51 YO male with PMH of lumbar Spinal Stenosis, Kidney Stone, and chronic back pain admitted to rehab after recent hospitalisation for increased pain and finding of Bacteremia Blood cx 's + GPC MSSA , L2- S1 laminectomy and evacuation of epidural abscess and washout on 10/22.. s/p Right shoulder subacromial injection 10/17/2022.      Continue  Comprehensive Rehab Program: PT/OT, 3hours daily and 5 days weekly      #Sepsis, Lumbar epidural abscess MSSA bacteremia, - s/p L2-S1 laminectomy and washout on 10/22/22- Further chart review of ID notes from outside hospital also shows bilateral psoas abscess    - Naficillin 2G Q 4hrs till 12/3 + Probiotics  - Left  UE PICC line inserted on 10/30  - Weekly ESR, CRP, CMP, and CBC - CRP 15 (11/7),  (11/7). WIll monitor and ask ID to see pt if continues to rise   KD negative for vegetation   Abd/pelvis - no drainable abscess   Blood cultures 10/22 - NG     #HTN:  - lisinopril 40mg daily    #Pain management  - Tylenol PRN, Oxycodone 15-20 PRN, Oxycontin 20 BID, Neurontin 300 TID  - baclofen 10mg tid prn spasms   - Tizanidine 2mg TID  - May use TENS to right SI region prn     Mood: anxiety about current medical course and his mother's condition  Neuropsych consult appreciated  - Xanax 0.25mg bid prn      #DVT ppx  - Heparin TID -> Lovenox QD  - TEDs    #GI ppx  - Pepcid 20mg     #Bowel Regimen:  - Senna, miralax   - moviprep + enema (11/7)     #Bladder management:   - continent. voiding without difficulty    # Diet:   - Regular    #Skin:  - Skin on admission: Lumbar spine incision healing well - staples removed 11/8  - Pressure injury/Skin: Turn Q2hrs while in bed, OOB to Chair, PT/OT     Precaution: - Fall, Spinal  LSO when OOB with therapy  and activity ( Per ortho )     IDT 11/9  Functional: fluctuates between CG - mod A depending on mood.  Overall min A in OT and can probably do more (self limiting)  Disp: 11/15 home vs DORY

## 2022-11-12 RX ADMIN — NAFCILLIN 200 GRAM(S): 10 INJECTION, POWDER, FOR SOLUTION INTRAVENOUS at 06:18

## 2022-11-12 RX ADMIN — NAFCILLIN 200 GRAM(S): 10 INJECTION, POWDER, FOR SOLUTION INTRAVENOUS at 10:42

## 2022-11-12 RX ADMIN — Medication 0.25 MILLIGRAM(S): at 09:04

## 2022-11-12 RX ADMIN — POLYETHYLENE GLYCOL 3350 17 GRAM(S): 17 POWDER, FOR SOLUTION ORAL at 11:42

## 2022-11-12 RX ADMIN — Medication 10 MILLIGRAM(S): at 13:39

## 2022-11-12 RX ADMIN — OXYCODONE HYDROCHLORIDE 15 MILLIGRAM(S): 5 TABLET ORAL at 22:34

## 2022-11-12 RX ADMIN — OXYCODONE HYDROCHLORIDE 15 MILLIGRAM(S): 5 TABLET ORAL at 09:04

## 2022-11-12 RX ADMIN — Medication 250 MILLIGRAM(S): at 06:24

## 2022-11-12 RX ADMIN — Medication 250 MILLIGRAM(S): at 18:06

## 2022-11-12 RX ADMIN — OXYCODONE HYDROCHLORIDE 15 MILLIGRAM(S): 5 TABLET ORAL at 13:39

## 2022-11-12 RX ADMIN — TIZANIDINE 2 MILLIGRAM(S): 4 TABLET ORAL at 13:39

## 2022-11-12 RX ADMIN — OXYCODONE HYDROCHLORIDE 20 MILLIGRAM(S): 5 TABLET ORAL at 18:45

## 2022-11-12 RX ADMIN — SENNA PLUS 2 TABLET(S): 8.6 TABLET ORAL at 22:34

## 2022-11-12 RX ADMIN — Medication 10 MILLIGRAM(S): at 22:34

## 2022-11-12 RX ADMIN — OXYCODONE HYDROCHLORIDE 15 MILLIGRAM(S): 5 TABLET ORAL at 23:23

## 2022-11-12 RX ADMIN — GABAPENTIN 300 MILLIGRAM(S): 400 CAPSULE ORAL at 13:39

## 2022-11-12 RX ADMIN — NAFCILLIN 200 GRAM(S): 10 INJECTION, POWDER, FOR SOLUTION INTRAVENOUS at 22:33

## 2022-11-12 RX ADMIN — OXYCODONE HYDROCHLORIDE 15 MILLIGRAM(S): 5 TABLET ORAL at 14:30

## 2022-11-12 RX ADMIN — TIZANIDINE 2 MILLIGRAM(S): 4 TABLET ORAL at 22:34

## 2022-11-12 RX ADMIN — CHLORHEXIDINE GLUCONATE 1 APPLICATION(S): 213 SOLUTION TOPICAL at 22:02

## 2022-11-12 RX ADMIN — LISINOPRIL 40 MILLIGRAM(S): 2.5 TABLET ORAL at 06:24

## 2022-11-12 RX ADMIN — GABAPENTIN 300 MILLIGRAM(S): 400 CAPSULE ORAL at 06:24

## 2022-11-12 RX ADMIN — OXYCODONE HYDROCHLORIDE 15 MILLIGRAM(S): 5 TABLET ORAL at 10:05

## 2022-11-12 RX ADMIN — OXYCODONE HYDROCHLORIDE 20 MILLIGRAM(S): 5 TABLET ORAL at 08:53

## 2022-11-12 RX ADMIN — CHLORHEXIDINE GLUCONATE 1 APPLICATION(S): 213 SOLUTION TOPICAL at 08:55

## 2022-11-12 RX ADMIN — OXYCODONE HYDROCHLORIDE 20 MILLIGRAM(S): 5 TABLET ORAL at 18:16

## 2022-11-12 RX ADMIN — OXYCODONE HYDROCHLORIDE 20 MILLIGRAM(S): 5 TABLET ORAL at 08:31

## 2022-11-12 RX ADMIN — FAMOTIDINE 20 MILLIGRAM(S): 10 INJECTION INTRAVENOUS at 11:42

## 2022-11-12 RX ADMIN — NAFCILLIN 200 GRAM(S): 10 INJECTION, POWDER, FOR SOLUTION INTRAVENOUS at 13:40

## 2022-11-12 RX ADMIN — Medication 1 TABLET(S): at 11:41

## 2022-11-12 RX ADMIN — GABAPENTIN 300 MILLIGRAM(S): 400 CAPSULE ORAL at 22:34

## 2022-11-12 RX ADMIN — NAFCILLIN 200 GRAM(S): 10 INJECTION, POWDER, FOR SOLUTION INTRAVENOUS at 02:01

## 2022-11-12 RX ADMIN — OXYCODONE HYDROCHLORIDE 20 MILLIGRAM(S): 5 TABLET ORAL at 20:54

## 2022-11-12 RX ADMIN — OXYCODONE HYDROCHLORIDE 20 MILLIGRAM(S): 5 TABLET ORAL at 21:11

## 2022-11-12 RX ADMIN — TIZANIDINE 2 MILLIGRAM(S): 4 TABLET ORAL at 06:24

## 2022-11-12 RX ADMIN — Medication 0.25 MILLIGRAM(S): at 22:35

## 2022-11-12 RX ADMIN — Medication 10 MILLIGRAM(S): at 06:24

## 2022-11-12 RX ADMIN — NAFCILLIN 200 GRAM(S): 10 INJECTION, POWDER, FOR SOLUTION INTRAVENOUS at 18:06

## 2022-11-12 RX ADMIN — ENOXAPARIN SODIUM 40 MILLIGRAM(S): 100 INJECTION SUBCUTANEOUS at 22:34

## 2022-11-13 LAB — SARS-COV-2 RNA SPEC QL NAA+PROBE: SIGNIFICANT CHANGE UP

## 2022-11-13 RX ADMIN — GABAPENTIN 300 MILLIGRAM(S): 400 CAPSULE ORAL at 06:23

## 2022-11-13 RX ADMIN — ENOXAPARIN SODIUM 40 MILLIGRAM(S): 100 INJECTION SUBCUTANEOUS at 21:29

## 2022-11-13 RX ADMIN — Medication 1 TABLET(S): at 11:29

## 2022-11-13 RX ADMIN — NAFCILLIN 200 GRAM(S): 10 INJECTION, POWDER, FOR SOLUTION INTRAVENOUS at 21:29

## 2022-11-13 RX ADMIN — OXYCODONE HYDROCHLORIDE 20 MILLIGRAM(S): 5 TABLET ORAL at 08:19

## 2022-11-13 RX ADMIN — SENNA PLUS 2 TABLET(S): 8.6 TABLET ORAL at 21:28

## 2022-11-13 RX ADMIN — OXYCODONE HYDROCHLORIDE 20 MILLIGRAM(S): 5 TABLET ORAL at 16:30

## 2022-11-13 RX ADMIN — TIZANIDINE 2 MILLIGRAM(S): 4 TABLET ORAL at 21:29

## 2022-11-13 RX ADMIN — CHLORHEXIDINE GLUCONATE 1 APPLICATION(S): 213 SOLUTION TOPICAL at 21:39

## 2022-11-13 RX ADMIN — GABAPENTIN 300 MILLIGRAM(S): 400 CAPSULE ORAL at 14:16

## 2022-11-13 RX ADMIN — Medication 10 MILLIGRAM(S): at 21:29

## 2022-11-13 RX ADMIN — OXYCODONE HYDROCHLORIDE 20 MILLIGRAM(S): 5 TABLET ORAL at 10:20

## 2022-11-13 RX ADMIN — Medication 0.25 MILLIGRAM(S): at 19:32

## 2022-11-13 RX ADMIN — OXYCODONE HYDROCHLORIDE 20 MILLIGRAM(S): 5 TABLET ORAL at 09:20

## 2022-11-13 RX ADMIN — NAFCILLIN 200 GRAM(S): 10 INJECTION, POWDER, FOR SOLUTION INTRAVENOUS at 02:04

## 2022-11-13 RX ADMIN — OXYCODONE HYDROCHLORIDE 20 MILLIGRAM(S): 5 TABLET ORAL at 22:16

## 2022-11-13 RX ADMIN — NAFCILLIN 200 GRAM(S): 10 INJECTION, POWDER, FOR SOLUTION INTRAVENOUS at 14:10

## 2022-11-13 RX ADMIN — NAFCILLIN 200 GRAM(S): 10 INJECTION, POWDER, FOR SOLUTION INTRAVENOUS at 17:39

## 2022-11-13 RX ADMIN — Medication 250 MILLIGRAM(S): at 06:23

## 2022-11-13 RX ADMIN — NAFCILLIN 200 GRAM(S): 10 INJECTION, POWDER, FOR SOLUTION INTRAVENOUS at 10:22

## 2022-11-13 RX ADMIN — Medication 250 MILLIGRAM(S): at 17:40

## 2022-11-13 RX ADMIN — Medication 10 MILLIGRAM(S): at 14:12

## 2022-11-13 RX ADMIN — OXYCODONE HYDROCHLORIDE 20 MILLIGRAM(S): 5 TABLET ORAL at 15:31

## 2022-11-13 RX ADMIN — NAFCILLIN 200 GRAM(S): 10 INJECTION, POWDER, FOR SOLUTION INTRAVENOUS at 06:23

## 2022-11-13 RX ADMIN — Medication 10 MILLIGRAM(S): at 06:22

## 2022-11-13 RX ADMIN — OXYCODONE HYDROCHLORIDE 20 MILLIGRAM(S): 5 TABLET ORAL at 19:33

## 2022-11-13 RX ADMIN — POLYETHYLENE GLYCOL 3350 17 GRAM(S): 17 POWDER, FOR SOLUTION ORAL at 11:29

## 2022-11-13 RX ADMIN — GABAPENTIN 300 MILLIGRAM(S): 400 CAPSULE ORAL at 21:28

## 2022-11-13 RX ADMIN — Medication 0.25 MILLIGRAM(S): at 09:21

## 2022-11-13 RX ADMIN — FAMOTIDINE 20 MILLIGRAM(S): 10 INJECTION INTRAVENOUS at 11:29

## 2022-11-13 RX ADMIN — TIZANIDINE 2 MILLIGRAM(S): 4 TABLET ORAL at 06:22

## 2022-11-13 RX ADMIN — TIZANIDINE 2 MILLIGRAM(S): 4 TABLET ORAL at 14:12

## 2022-11-13 RX ADMIN — LISINOPRIL 40 MILLIGRAM(S): 2.5 TABLET ORAL at 06:22

## 2022-11-13 NOTE — PROGRESS NOTE ADULT - SUBJECTIVE AND OBJECTIVE BOX
Cc: Gait dysfunction    HPI: Patient with no new medical issues overnight.  Pain controlled, no chest pain, no N/V, no Fevers/Chills. No other new ROS  Has been tolerating rehabilitation program.    acetaminophen     Tablet .. 650 milliGRAM(s) Oral every 6 hours PRN  ALPRAZolam 0.25 milliGRAM(s) Oral two times a day PRN  baclofen 10 milliGRAM(s) Oral every 8 hours  chlorhexidine 2% Cloths 1 Application(s) Topical daily  enoxaparin Injectable 40 milliGRAM(s) SubCutaneous every 24 hours  famotidine    Tablet 20 milliGRAM(s) Oral daily  gabapentin 300 milliGRAM(s) Oral three times a day  lisinopril 40 milliGRAM(s) Oral daily  multivitamin 1 Tablet(s) Oral daily  nafcillin  IVPB 2 Gram(s) IV Intermittent every 4 hours  oxyCODONE    IR 20 milliGRAM(s) Oral every 4 hours PRN  oxyCODONE    IR 15 milliGRAM(s) Oral every 4 hours PRN  oxyCODONE  ER Tablet 20 milliGRAM(s) Oral <User Schedule>  polyethylene glycol 3350 17 Gram(s) Oral daily  saccharomyces boulardii 250 milliGRAM(s) Oral two times a day  senna 2 Tablet(s) Oral at bedtime  simethicone 80 milliGRAM(s) Chew four times a day PRN  tiZANidine 2 milliGRAM(s) Oral three times a day      T(C): 36.7 (11-13-22 @ 08:37), Max: 36.7 (11-12-22 @ 22:35)  HR: 83 (11-13-22 @ 08:37) (83 - 86)  BP: 138/78 (11-13-22 @ 08:37) (136/79 - 138/78)  RR: 16 (11-13-22 @ 08:37) (15 - 16)  SpO2: 96% (11-13-22 @ 08:37) (96% - 97%)    In NAD  HEENT- EOMI  Heart- RRR, S1S2  Lungs- CTA bl.  Abd- + BS, NT  Ext- No calf pain  Neuro- Exam unchanged

## 2022-11-13 NOTE — PROGRESS NOTE ADULT - ASSESSMENT
Airway  Performed by: Rhett Lauren  Authorized by: Saira Torres MD     Final Airway Type:  Endotracheal airway  Final Endotracheal Airway*:  ETT  ETT Size (mm)*:  7.0  Cuff*:  Regular  Technique Used for Successful ETT Placement:  Direct laryngoscopy  Devices/Methods Used in Placement*:  Mask and Oral ETT  Intubation Procedure*:  Preoxygenation, ETCO2, Atraumatic, Dentition Unchanged and Phaynx Clear  Insertion Site:  Oral  Blade Type*:  MAC  Blade Size*:  3  Secured at (cm)*:  23  Placement Verified by: auscultation and capnometry    Glottic View*:  3 - view of epiglottis only  Attempts*:  1  Number of Other Approaches Attempted:  0   Patient Identified, Procedure confirmed, Emergency equipment available and Safety protocols followed  Location:  OR  Urgency:  Elective  Difficult Airway: No    Indications for Airway Management:  Anesthesia  Spontaneous Ventilation: absent    Sedation Level:  Anesthetized  Mask Difficulty Assessment:  1 - vent by mask  Performed By:  Other   Easy BMV. Atraumatic ETT insertion with dentition unchanged by CARLOTA [place].               Imp: Patient with diagnosis of  lumbar laminectomy    admitted for comprehensive acute rehabilitation.    Plan:  - Continue PT/OT/SLP  - DVT prophylaxis  - Skin- Turn q2h, check skin daily  - Continue current medications; patient medically stable.   -Active issues-   - Patient is stable to continue current rehabilitation program.   - Patient reported having trouble with brocolli, asked for speech and swallow to follow up with patient  - Patient' have intermittent break through pain, however has been able to tolerate PT treatments of late

## 2022-11-14 ENCOUNTER — TRANSCRIPTION ENCOUNTER (OUTPATIENT)
Age: 52
End: 2022-11-14

## 2022-11-14 LAB
ALBUMIN SERPL ELPH-MCNC: 2.8 G/DL — LOW (ref 3.3–5)
ALP SERPL-CCNC: 100 U/L — SIGNIFICANT CHANGE UP (ref 40–120)
ALT FLD-CCNC: 27 U/L — SIGNIFICANT CHANGE UP (ref 10–45)
ANION GAP SERPL CALC-SCNC: 11 MMOL/L — SIGNIFICANT CHANGE UP (ref 5–17)
AST SERPL-CCNC: 12 U/L — SIGNIFICANT CHANGE UP (ref 10–40)
BILIRUB SERPL-MCNC: 0.9 MG/DL — SIGNIFICANT CHANGE UP (ref 0.2–1.2)
BUN SERPL-MCNC: 15 MG/DL — SIGNIFICANT CHANGE UP (ref 7–23)
CALCIUM SERPL-MCNC: 9.5 MG/DL — SIGNIFICANT CHANGE UP (ref 8.4–10.5)
CHLORIDE SERPL-SCNC: 107 MMOL/L — SIGNIFICANT CHANGE UP (ref 96–108)
CO2 SERPL-SCNC: 23 MMOL/L — SIGNIFICANT CHANGE UP (ref 22–31)
CREAT SERPL-MCNC: 0.85 MG/DL — SIGNIFICANT CHANGE UP (ref 0.5–1.3)
CRP SERPL-MCNC: 6 MG/L — HIGH
EGFR: 104 ML/MIN/1.73M2 — SIGNIFICANT CHANGE UP
ERYTHROCYTE [SEDIMENTATION RATE] IN BLOOD: 89 MM/HR — HIGH (ref 0–20)
GLUCOSE SERPL-MCNC: 91 MG/DL — SIGNIFICANT CHANGE UP (ref 70–99)
HCT VFR BLD CALC: 39.6 % — SIGNIFICANT CHANGE UP (ref 39–50)
HGB BLD-MCNC: 12.7 G/DL — LOW (ref 13–17)
MCHC RBC-ENTMCNC: 31.2 PG — SIGNIFICANT CHANGE UP (ref 27–34)
MCHC RBC-ENTMCNC: 32.1 GM/DL — SIGNIFICANT CHANGE UP (ref 32–36)
MCV RBC AUTO: 97.3 FL — SIGNIFICANT CHANGE UP (ref 80–100)
NRBC # BLD: 0 /100 WBCS — SIGNIFICANT CHANGE UP (ref 0–0)
PLATELET # BLD AUTO: 452 K/UL — HIGH (ref 150–400)
POTASSIUM SERPL-MCNC: 3.9 MMOL/L — SIGNIFICANT CHANGE UP (ref 3.5–5.3)
POTASSIUM SERPL-SCNC: 3.9 MMOL/L — SIGNIFICANT CHANGE UP (ref 3.5–5.3)
PROT SERPL-MCNC: 7.2 G/DL — SIGNIFICANT CHANGE UP (ref 6–8.3)
RBC # BLD: 4.07 M/UL — LOW (ref 4.2–5.8)
RBC # FLD: 13.5 % — SIGNIFICANT CHANGE UP (ref 10.3–14.5)
SODIUM SERPL-SCNC: 141 MMOL/L — SIGNIFICANT CHANGE UP (ref 135–145)
WBC # BLD: 5.05 K/UL — SIGNIFICANT CHANGE UP (ref 3.8–10.5)
WBC # FLD AUTO: 5.05 K/UL — SIGNIFICANT CHANGE UP (ref 3.8–10.5)

## 2022-11-14 PROCEDURE — 99232 SBSQ HOSP IP/OBS MODERATE 35: CPT

## 2022-11-14 PROCEDURE — 99233 SBSQ HOSP IP/OBS HIGH 50: CPT

## 2022-11-14 PROCEDURE — 90832 PSYTX W PT 30 MINUTES: CPT

## 2022-11-14 RX ORDER — SACCHAROMYCES BOULARDII 250 MG
1 POWDER IN PACKET (EA) ORAL
Qty: 0 | Refills: 0 | DISCHARGE
Start: 2022-11-14

## 2022-11-14 RX ORDER — POLYETHYLENE GLYCOL 3350 17 G/17G
17 POWDER, FOR SOLUTION ORAL
Qty: 0 | Refills: 0 | DISCHARGE
Start: 2022-11-14

## 2022-11-14 RX ORDER — ENOXAPARIN SODIUM 100 MG/ML
40 INJECTION SUBCUTANEOUS
Qty: 0 | Refills: 0 | DISCHARGE
Start: 2022-11-14

## 2022-11-14 RX ORDER — FAMOTIDINE 10 MG/ML
1 INJECTION INTRAVENOUS
Qty: 0 | Refills: 0 | DISCHARGE
Start: 2022-11-14

## 2022-11-14 RX ORDER — ACETAMINOPHEN 500 MG
2 TABLET ORAL
Qty: 0 | Refills: 0 | DISCHARGE
Start: 2022-11-14

## 2022-11-14 RX ORDER — DIAZEPAM 5 MG
1 TABLET ORAL
Qty: 0 | Refills: 0 | DISCHARGE

## 2022-11-14 RX ORDER — LISINOPRIL 2.5 MG/1
1 TABLET ORAL
Qty: 0 | Refills: 0 | DISCHARGE
Start: 2022-11-14

## 2022-11-14 RX ORDER — CHLORHEXIDINE GLUCONATE 213 G/1000ML
1 SOLUTION TOPICAL
Qty: 0 | Refills: 0 | DISCHARGE
Start: 2022-11-14

## 2022-11-14 RX ORDER — OXYCODONE HYDROCHLORIDE 5 MG/1
1 TABLET ORAL
Qty: 0 | Refills: 0 | DISCHARGE
Start: 2022-11-14

## 2022-11-14 RX ORDER — SENNA PLUS 8.6 MG/1
2 TABLET ORAL
Qty: 0 | Refills: 0 | DISCHARGE
Start: 2022-11-14

## 2022-11-14 RX ORDER — QUINAPRIL HYDROCHLORIDE 40 MG/1
1 TABLET, FILM COATED ORAL
Qty: 0 | Refills: 0 | DISCHARGE

## 2022-11-14 RX ORDER — ALPRAZOLAM 0.25 MG
1 TABLET ORAL
Qty: 0 | Refills: 0 | DISCHARGE
Start: 2022-11-14

## 2022-11-14 RX ORDER — OXYCODONE HYDROCHLORIDE 5 MG/1
1 TABLET ORAL
Qty: 0 | Refills: 0 | DISCHARGE

## 2022-11-14 RX ORDER — BACLOFEN 100 %
1 POWDER (GRAM) MISCELLANEOUS
Qty: 0 | Refills: 0 | DISCHARGE
Start: 2022-11-14

## 2022-11-14 RX ORDER — SIMETHICONE 80 MG/1
1 TABLET, CHEWABLE ORAL
Qty: 0 | Refills: 0 | DISCHARGE
Start: 2022-11-14

## 2022-11-14 RX ORDER — GABAPENTIN 400 MG/1
1 CAPSULE ORAL
Qty: 0 | Refills: 0 | DISCHARGE
Start: 2022-11-14

## 2022-11-14 RX ORDER — CYCLOBENZAPRINE HYDROCHLORIDE 10 MG/1
1 TABLET, FILM COATED ORAL
Qty: 0 | Refills: 0 | DISCHARGE

## 2022-11-14 RX ORDER — NAFCILLIN 10 G/100ML
2 INJECTION, POWDER, FOR SOLUTION INTRAVENOUS
Qty: 0 | Refills: 0 | DISCHARGE
Start: 2022-11-14

## 2022-11-14 RX ORDER — OXYCODONE HYDROCHLORIDE 5 MG/1
20 TABLET ORAL
Refills: 0 | Status: DISCONTINUED | OUTPATIENT
Start: 2022-11-14 | End: 2022-11-17

## 2022-11-14 RX ORDER — GABAPENTIN 400 MG/1
1 CAPSULE ORAL
Qty: 0 | Refills: 0 | DISCHARGE

## 2022-11-14 RX ORDER — TIZANIDINE 4 MG/1
1 TABLET ORAL
Qty: 0 | Refills: 0 | DISCHARGE
Start: 2022-11-14

## 2022-11-14 RX ORDER — OXYCODONE HYDROCHLORIDE 5 MG/1
20 TABLET ORAL EVERY 4 HOURS
Refills: 0 | Status: DISCONTINUED | OUTPATIENT
Start: 2022-11-14 | End: 2022-11-17

## 2022-11-14 RX ADMIN — TIZANIDINE 2 MILLIGRAM(S): 4 TABLET ORAL at 15:23

## 2022-11-14 RX ADMIN — GABAPENTIN 300 MILLIGRAM(S): 400 CAPSULE ORAL at 05:39

## 2022-11-14 RX ADMIN — Medication 0.25 MILLIGRAM(S): at 23:58

## 2022-11-14 RX ADMIN — SENNA PLUS 2 TABLET(S): 8.6 TABLET ORAL at 22:07

## 2022-11-14 RX ADMIN — OXYCODONE HYDROCHLORIDE 15 MILLIGRAM(S): 5 TABLET ORAL at 19:59

## 2022-11-14 RX ADMIN — NAFCILLIN 200 GRAM(S): 10 INJECTION, POWDER, FOR SOLUTION INTRAVENOUS at 15:23

## 2022-11-14 RX ADMIN — NAFCILLIN 200 GRAM(S): 10 INJECTION, POWDER, FOR SOLUTION INTRAVENOUS at 01:10

## 2022-11-14 RX ADMIN — CHLORHEXIDINE GLUCONATE 1 APPLICATION(S): 213 SOLUTION TOPICAL at 20:58

## 2022-11-14 RX ADMIN — Medication 250 MILLIGRAM(S): at 18:20

## 2022-11-14 RX ADMIN — Medication 10 MILLIGRAM(S): at 05:38

## 2022-11-14 RX ADMIN — ENOXAPARIN SODIUM 40 MILLIGRAM(S): 100 INJECTION SUBCUTANEOUS at 22:07

## 2022-11-14 RX ADMIN — OXYCODONE HYDROCHLORIDE 15 MILLIGRAM(S): 5 TABLET ORAL at 12:59

## 2022-11-14 RX ADMIN — NAFCILLIN 200 GRAM(S): 10 INJECTION, POWDER, FOR SOLUTION INTRAVENOUS at 18:20

## 2022-11-14 RX ADMIN — OXYCODONE HYDROCHLORIDE 15 MILLIGRAM(S): 5 TABLET ORAL at 18:20

## 2022-11-14 RX ADMIN — GABAPENTIN 300 MILLIGRAM(S): 400 CAPSULE ORAL at 22:08

## 2022-11-14 RX ADMIN — OXYCODONE HYDROCHLORIDE 20 MILLIGRAM(S): 5 TABLET ORAL at 21:54

## 2022-11-14 RX ADMIN — Medication 10 MILLIGRAM(S): at 22:08

## 2022-11-14 RX ADMIN — OXYCODONE HYDROCHLORIDE 20 MILLIGRAM(S): 5 TABLET ORAL at 07:33

## 2022-11-14 RX ADMIN — TIZANIDINE 2 MILLIGRAM(S): 4 TABLET ORAL at 05:39

## 2022-11-14 RX ADMIN — LISINOPRIL 40 MILLIGRAM(S): 2.5 TABLET ORAL at 05:38

## 2022-11-14 RX ADMIN — GABAPENTIN 300 MILLIGRAM(S): 400 CAPSULE ORAL at 15:23

## 2022-11-14 RX ADMIN — Medication 10 MILLIGRAM(S): at 15:23

## 2022-11-14 RX ADMIN — OXYCODONE HYDROCHLORIDE 15 MILLIGRAM(S): 5 TABLET ORAL at 13:45

## 2022-11-14 RX ADMIN — Medication 1 TABLET(S): at 12:11

## 2022-11-14 RX ADMIN — FAMOTIDINE 20 MILLIGRAM(S): 10 INJECTION INTRAVENOUS at 12:12

## 2022-11-14 RX ADMIN — OXYCODONE HYDROCHLORIDE 20 MILLIGRAM(S): 5 TABLET ORAL at 20:58

## 2022-11-14 RX ADMIN — TIZANIDINE 2 MILLIGRAM(S): 4 TABLET ORAL at 22:08

## 2022-11-14 RX ADMIN — OXYCODONE HYDROCHLORIDE 15 MILLIGRAM(S): 5 TABLET ORAL at 08:15

## 2022-11-14 RX ADMIN — POLYETHYLENE GLYCOL 3350 17 GRAM(S): 17 POWDER, FOR SOLUTION ORAL at 12:12

## 2022-11-14 RX ADMIN — OXYCODONE HYDROCHLORIDE 15 MILLIGRAM(S): 5 TABLET ORAL at 23:58

## 2022-11-14 RX ADMIN — Medication 250 MILLIGRAM(S): at 05:38

## 2022-11-14 RX ADMIN — NAFCILLIN 200 GRAM(S): 10 INJECTION, POWDER, FOR SOLUTION INTRAVENOUS at 05:38

## 2022-11-14 RX ADMIN — OXYCODONE HYDROCHLORIDE 15 MILLIGRAM(S): 5 TABLET ORAL at 07:33

## 2022-11-14 RX ADMIN — Medication 0.25 MILLIGRAM(S): at 12:58

## 2022-11-14 RX ADMIN — NAFCILLIN 200 GRAM(S): 10 INJECTION, POWDER, FOR SOLUTION INTRAVENOUS at 10:50

## 2022-11-14 RX ADMIN — NAFCILLIN 200 GRAM(S): 10 INJECTION, POWDER, FOR SOLUTION INTRAVENOUS at 22:10

## 2022-11-14 NOTE — PROGRESS NOTE ADULT - SUBJECTIVE AND OBJECTIVE BOX
52y old  Male who presents with a chief complaint of Lumbar Laminectomy     Patient seen and examined at bedside, feel better today,   c/o pain in the right lower back, 6/10, aggravated with movements, radiates to right SI joint,  slightly better than last week  no fever, chills, cough, sob, cp, abd pain.      Vital Signs Last 24 Hrs  T(C): 36.9 (14 Nov 2022 08:57), Max: 36.9 (14 Nov 2022 08:57)  T(F): 98.4 (14 Nov 2022 08:57), Max: 98.4 (14 Nov 2022 08:57)  HR: 112 (14 Nov 2022 08:57) (88 - 112)  BP: 142/104 (14 Nov 2022 08:57) (141/87 - 160/118)  BP(mean): --  RR: 17 (14 Nov 2022 08:57) (16 - 17)  SpO2: 94% (14 Nov 2022 08:57) (94% - 96%)    Parameters below as of 14 Nov 2022 08:57  Patient On (Oxygen Delivery Method): room air        GENERAL- NAD  EAR/NOSE/MOUTH/THROAT - no pharyngeal exudates, no oral lesion's  MMM  EYES- MERRICK, conjunctiva and Sclera clear  NECK- supple  RESPIRATORY-  clear to auscultation bilaterally, non laboured breathing  CARDIOVASCULAR - SIS2, RRR  GI - soft NT BS present  EXTREMITIES- no pedal edema  NEUROLOGY- B/L LE WEAKNESS- improving  PSYCHIATRY- AAO X 3                  12.7                 141  | 23   | 15           5.05  >-----------< 452     ------------------------< 91                    39.6                 3.9  | 107  | 0.85                                         Ca 9.5   Mg x     Ph x          MEDICATIONS  (STANDING):  baclofen 10 milliGRAM(s) Oral every 8 hours  chlorhexidine 2% Cloths 1 Application(s) Topical daily  enoxaparin Injectable 40 milliGRAM(s) SubCutaneous every 24 hours  famotidine    Tablet 20 milliGRAM(s) Oral daily  gabapentin 300 milliGRAM(s) Oral three times a day  lisinopril 40 milliGRAM(s) Oral daily  multivitamin 1 Tablet(s) Oral daily  nafcillin  IVPB 2 Gram(s) IV Intermittent every 4 hours  oxyCODONE  ER Tablet 20 milliGRAM(s) Oral <User Schedule>  polyethylene glycol 3350 17 Gram(s) Oral daily  saccharomyces boulardii 250 milliGRAM(s) Oral two times a day  senna 2 Tablet(s) Oral at bedtime  tiZANidine 2 milliGRAM(s) Oral three times a day    MEDICATIONS  (PRN):  acetaminophen     Tablet .. 650 milliGRAM(s) Oral every 6 hours PRN Temp greater or equal to 38C (100.4F), Mild Pain (1 - 3)  ALPRAZolam 0.25 milliGRAM(s) Oral two times a day PRN anxiety  oxyCODONE    IR 15 milliGRAM(s) Oral every 4 hours PRN Moderate Pain (4 - 6)  oxyCODONE    IR 20 milliGRAM(s) Oral every 4 hours PRN Severe Pain (7 - 10)  simethicone 80 milliGRAM(s) Chew four times a day PRN Heartburn

## 2022-11-14 NOTE — PROGRESS NOTE ADULT - ASSESSMENT
Patient participates in session at bedside. He is alert and oriented x4. He is engaged and cooperative in session. Mood is less anxious, with congruent affect. He expresses greater self-awareness of his anxiety and behaviors which can interfere with daily functioning/progress. Reinforced psychoeducation regarding the rehabilitation process, changes in functional independence, and openness to receiving assistance. Support and encouragement are provided. Cognitive-behavioral therapy approach is used to encourage sense of control over his situation implement adaptive coping strategies for managing heightened stress. Plan: Follow-up sessions to continue. Neuropsychology remains available.

## 2022-11-14 NOTE — DISCHARGE NOTE PROVIDER - DETAILS OF MALNUTRITION DIAGNOSIS/DIAGNOSES
This patient has been assessed with a concern for Malnutrition and was treated during this hospitalization for the following Nutrition diagnosis/diagnoses:     -  11/01/2022: Severe protein-calorie malnutrition

## 2022-11-14 NOTE — PROGRESS NOTE ADULT - ASSESSMENT
53 YO male with PMH of lumbar Spinal Stenosis, Kidney Stone, and chronic back pain admitted to rehab after recent hospitalisation for increased pain and finding of Bacteremia Blood cx 's + GPC MSSA , L2- S1 laminectomy and evacuation of epidural abscess and washout on 10/22.. s/p Right shoulder subacromial injection 10/17/2022.      Continue  Comprehensive Rehab Program: PT/OT, 3hours daily and 5 days weekly      #Sepsis, Lumbar epidural abscess MSSA bacteremia, - s/p L2-S1 laminectomy and washout on 10/22/22- Further chart review of ID notes from outside hospital also shows bilateral psoas abscess    - Naficillin 2G Q 4hrs till 12/3 + Probiotics  - Left  UE PICC line inserted on 10/30  - Weekly ESR, CRP, CMP, and CBC - downtrending       #HTN:  - lisinopril 40mg daily    #Pain management  - Tylenol PRN, Oxycodone 15-20 PRN, Oxycontin 20 BID, Neurontin 300 TID  - baclofen 10mg tid prn spasms   - Tizanidine 2mg TID  - May use TENS to right SI region prn     Mood: anxiety about current medical course and his mother's condition  Neuropsych consult appreciated  - Xanax 0.25mg bid prn      #DVT ppx  - Heparin TID -> Lovenox QD  - TEDs    #GI ppx  - Pepcid 20mg     #Bowel Regimen:  - Senna, miralax   - moviprep + enema (11/7)     #Bladder management:   - continent. voiding without difficulty    # Diet:   - Regular    #Skin:  - Skin on admission: Lumbar spine incision healing well - staples removed 11/8  - Pressure injury/Skin: Turn Q2hrs while in bed, OOB to Chair, PT/OT     Precaution: - Fall, Spinal  LSO when OOB with therapy  and activity ( Per ortho )     IDT 11/9  Disp: 11/15  DORY

## 2022-11-14 NOTE — DISCHARGE NOTE PROVIDER - CARE PROVIDERS DIRECT ADDRESSES
[email or web address]

## 2022-11-14 NOTE — DISCHARGE NOTE PROVIDER - CARE PROVIDER_API CALL
Benito Blanco  INTERNAL MEDICINE  [address], [address]  [place] [address]  Phone: [phone]  Fax: [phone]  Follow Up Time: 1 week    Russ Bautista)  Neurosurgery  [address], [address]  [place] [address]  Phone: [phone]  Fax: [phone]  Follow Up Time: 2 weeks    Fiona Lorenzo ()  PhysicalRehab Medicine  [address] [place] [address]  Phone: [phone]  Fax: [phone]  Follow Up Time: 1 month

## 2022-11-14 NOTE — PROGRESS NOTE ADULT - ASSESSMENT
53 YO male with PMH of lumbar Spinal Stenosis, Kidney Stone, and chronic back pain due to sports injuries who presented to [facility] on 10/14 with c/o Right Sided Shoulder Pain and acute on chronic Lower back pain. Shoulder MRI with Mildly displaced anterior glenoid labrum extending from the three to 4:00 position with evidence of an ALPSA lesion. Soft tissue swelling and edema seen in the region of the distal clavicle involving the trapezius musculature. s/p Right shoulder subacromial injection 10/17/2022. Hospital course significant for sepsis, no on long term IV ABX. Patient now with gait Instability, ADL impairments and Functional impairments.    #Mildly displaced anterior glenoid labrum   #Lumbar Spinal Stenosis  - s/p Right shoulder subacromial injection 10/17/2022  - s/p L2-S1 laminectomy and washout on 10/22/22    #MSSA bacteremia due to epidural abscess and bilateral psoas abscess  - s/p wound washout   - repeat blood cx on 10/22 negative  - Naficillin 2G Q 4hrs till 12/3  - Left DL PICC line inserted on 10/30    #HTN  - lisinopril   - control pain    #DVT ppx  - Heparin    #GI ppx  - Pepcid     will follow  d/w dr. roth

## 2022-11-14 NOTE — PROGRESS NOTE ADULT - SUBJECTIVE AND OBJECTIVE BOX
Patient participated in 20-minute, follow-up, supportive session. He indicates more positive outlook and greater acceptance of his current situation and physical limitations.

## 2022-11-14 NOTE — PROGRESS NOTE ADULT - SUBJECTIVE AND OBJECTIVE BOX
Patient is a 52y old  Male who presents with a chief complaint of epidural abscess and lumbar lami     SUBJECTIVE & REVIEW OF SYMPTOMS:  patient seen and evaluated while sitting up in WC at bedside  tolerating therapy   feels like his shoulders are "caving in"  high anxiety about dc process,      Denies CP, SOB, HOOK, HA, dizziness, nausea, dysuria.  Has full control of bowel and bladder                           12.7   5.05  )-----------( 452      ( 14 Nov 2022 06:40 )             39.6     11-14    141  |  107  |  15  ----------------------------<  91  3.9   |  23  |  0.85    Ca    9.5      14 Nov 2022 06:40    TPro  7.2  /  Alb  2.8<L>  /  TBili  0.9  /  DBili  x   /  AST  12  /  ALT  27  /  AlkPhos  100  11-14                  MEDICATIONS  (STANDING):  baclofen 10 milliGRAM(s) Oral every 8 hours  chlorhexidine 2% Cloths 1 Application(s) Topical daily  enoxaparin Injectable 40 milliGRAM(s) SubCutaneous every 24 hours  famotidine    Tablet 20 milliGRAM(s) Oral daily  gabapentin 300 milliGRAM(s) Oral three times a day  lisinopril 40 milliGRAM(s) Oral daily  multivitamin 1 Tablet(s) Oral daily  nafcillin  IVPB 2 Gram(s) IV Intermittent every 4 hours  oxyCODONE  ER Tablet 20 milliGRAM(s) Oral <User Schedule>  polyethylene glycol 3350 17 Gram(s) Oral daily  saccharomyces boulardii 250 milliGRAM(s) Oral two times a day  senna 2 Tablet(s) Oral at bedtime  tiZANidine 2 milliGRAM(s) Oral three times a day    MEDICATIONS  (PRN):  acetaminophen     Tablet .. 650 milliGRAM(s) Oral every 6 hours PRN Temp greater or equal to 38C (100.4F), Mild Pain (1 - 3)  ALPRAZolam 0.25 milliGRAM(s) Oral two times a day PRN anxiety  oxyCODONE    IR 15 milliGRAM(s) Oral every 4 hours PRN Moderate Pain (4 - 6)  oxyCODONE    IR 20 milliGRAM(s) Oral every 4 hours PRN Severe Pain (7 - 10)  simethicone 80 milliGRAM(s) Chew four times a day PRN Heartburn        physical exam:  Constitutional - NAD  Chest - resp non labored  Cardiovascular - warm and well perfused   Abdomen - mildly distended, tympanic throughout  Extremities - No C/C/E, No calf tenderness   Psychiatric - mildly anxious  MSK: no muscle atrophy bilateral shoulders  Neuro:- 4/5 bilateral lower extremities

## 2022-11-14 NOTE — DISCHARGE NOTE PROVIDER - HOSPITAL COURSE
This is a 53 YO male with PMH of lumbar Spinal Stenosis, Kidney Stone, and chronic back pain due to sports injuries who presented to [facility] on 10/14 with c/o Right Sided Shoulder Pain and acute on chronic Lower back pain. Pt seen by neurosurgery no acute intervention outpt FU. Shoulder XR negative. Shoulder Mri with Mildly displaced anterior glenoid labrum extending from the three to 4:00 position with evidence of an ALPSA lesion. Soft tissue swelling and edema seen in the region of the distal clavicle involving the trapezius musculature. Per Ortho, no acute orthopedic surgical intervention warranted. S/p Right shoulder subacromial injection 10/17/2022. Hospital course significant for RRT on 10/18 due to rigors and severe pain, Temp 102.8.RRT on 10/20 for CP and SOB. Bacteremia Blood cx 's + GPC MSSA   L2- S1 laminectomy and evacuation of epidural abscess and washout on 10/22. Seen by ID, nafcillin 2 q 4hrs till 12/3 with weekly sed rate, crp, cmp and cbc. PICC line inserted on 10/30    KD negative for vegetation    Pt was stable upon rehab admission to  [facility]. Admitted with gait instabilty, ADL, and functional impairments.     Rehab Course significant for:   - Pain/spasm - started on long acting narcotic Oxycontin + short acting oxycodone.  For anti-spasmodic pt is on baclofen and tizanidine  - Anxiety secondary to pain  - Had 2 event of choking on food because of posture (eating in supine)    All other medical co-morbidities were stable.     Pt tolerated course of inpatient PT/OT rehab with significant functional improvements and met rehab goals prior to discharge.    Pt was medically cleared on _11/15__  for discharged to _[facility]__

## 2022-11-14 NOTE — DISCHARGE NOTE PROVIDER - NSDCMRMEDTOKEN_GEN_ALL_CORE_FT
cyclobenzaprine 10 mg oral tablet: 1 tab(s) orally once a day  gabapentin 600 mg oral tablet: 1 tab(s) orally once a day  oxyCODONE 15 mg oral tablet: 1 tab(s) orally 2 times a day -for severe pain MDD:4 tablets   quinapril 40 mg oral tablet: 1 tab(s) orally once a day  Valium 10 mg oral tablet: 1 tab(s) orally once a day   acetaminophen 325 mg oral tablet: 2 tab(s) orally every 6 hours, As needed, Temp greater or equal to 38C (100.4F), Mild Pain (1 - 3)  ALPRAZolam 0.25 mg oral tablet: 1 tab(s) orally 2 times a day, As needed, anxiety  baclofen 10 mg oral tablet: 1 tab(s) orally every 8 hours  chlorhexidine 2% topical pad: 1 application topically once a day  famotidine 20 mg oral tablet: 1 tab(s) orally once a day  gabapentin 300 mg oral capsule: 1 cap(s) orally 3 times a day  lisinopril 40 mg oral tablet: 1 tab(s) orally once a day  Multiple Vitamins oral tablet: 1 tab(s) orally once a day  oxyCODONE 15 mg oral tablet: 1 tab(s) orally every 4 hours, As needed, Moderate Pain (4 - 6)  oxyCODONE 20 mg oral tablet: 1 tab(s) orally every 4 hours, As needed, Severe Pain (7 - 10)  oxyCODONE 20 mg oral tablet, extended release: 1 tab(s) orally   polyethylene glycol 3350 oral powder for reconstitution: 17 gram(s) orally once a day  saccharomyces boulardii lyo 250 mg oral capsule: 1 cap(s) orally 2 times a day  senna leaf extract oral tablet: 2 tab(s) orally once a day (at bedtime)  simethicone 80 mg oral tablet, chewable: 1 tab(s) orally 4 times a day, As needed, Heartburn  tiZANidine 2 mg oral tablet: 1 tab(s) orally 3 times a day   acetaminophen 325 mg oral tablet: 2 tab(s) orally every 6 hours, As needed, Temp greater or equal to 38C (100.4F), Mild Pain (1 - 3)  ALPRAZolam 0.25 mg oral tablet: 1 tab(s) orally 2 times a day, As needed, anxiety  baclofen 10 mg oral tablet: 1 tab(s) orally every 8 hours  chlorhexidine 2% topical pad: 1 application topically once a day  enoxaparin: 40 milligram(s) subcutaneous once a day @ 9pm  famotidine 20 mg oral tablet: 1 tab(s) orally once a day  gabapentin 300 mg oral capsule: 1 cap(s) orally 3 times a day  lisinopril 40 mg oral tablet: 1 tab(s) orally once a day  Multiple Vitamins oral tablet: 1 tab(s) orally once a day  nafcillin: 2 gram(s) intravenous every 4 hours - til 12/3  oxyCODONE 15 mg oral tablet: 1 tab(s) orally every 4 hours, As needed, Moderate Pain (4 - 6)  oxyCODONE 20 mg oral tablet: 1 tab(s) orally every 4 hours, As needed, Severe Pain (7 - 10)  oxyCODONE 20 mg oral tablet, extended release: 1 tab(s) orally 2 times a day  polyethylene glycol 3350 oral powder for reconstitution: 17 gram(s) orally once a day  saccharomyces boulardii lyo 250 mg oral capsule: 1 cap(s) orally 2 times a day  senna leaf extract oral tablet: 2 tab(s) orally once a day (at bedtime)  simethicone 80 mg oral tablet, chewable: 1 tab(s) orally 4 times a day, As needed, Heartburn  tiZANidine 2 mg oral tablet: 1 tab(s) orally 3 times a day

## 2022-11-14 NOTE — DISCHARGE NOTE PROVIDER - NSDCCPCAREPLAN_GEN_ALL_CORE_FT
PRINCIPAL DISCHARGE DIAGNOSIS  Diagnosis: Sepsis  Assessment and Plan of Treatment: you were at [facility] on 10/14 -> workup revealed that you had bacteremia with lumbar epidural abscess. S/p L2-S1 laminectomy and washout on 10/22.  IV antibiotics x 6 weeks (til 12/3/2022) - has a left PICC      SECONDARY DISCHARGE DIAGNOSES  Diagnosis: Spine pain, multilevel  Assessment and Plan of Treatment: Surgical/spinal pain + spasm  Continue with current pain management and wean as tolerated     PRINCIPAL DISCHARGE DIAGNOSIS  Diagnosis: Sepsis  Assessment and Plan of Treatment: you were at [facility] on 10/14 -> workup revealed that you had bacteremia with lumbar epidural abscess. S/p L2-S1 laminectomy and washout on 10/22.  IV antibiotics x 6 weeks (til 12/3/2022) - has a left PICC      SECONDARY DISCHARGE DIAGNOSES  Diagnosis: Spine pain, multilevel  Assessment and Plan of Treatment: Surgical/spinal pain + spasm  Continue with current pain management and wean as tolerated    Diagnosis: Constipation  Assessment and Plan of Treatment: constipation - stool burden on xray (no obstruction)  Recommend a good bowel regimen.  Has tried suppository, enema, and mini bowel prep (moviprep) with benefit.  Now maintaining on senna and miralax  Should have a BM at least every 2 days.  If > 2 days, will need to consider aggressive treatment (repeating suppository/enema and/or moviprep)

## 2022-11-14 NOTE — DISCHARGE NOTE PROVIDER - PROVIDER TOKENS
PROVIDER:[TOKEN:[[id number]],FOLLOWUP:[1 week]],PROVIDER:[TOKEN:[[id number]],FOLLOWUP:[2 weeks]],PROVIDER:[TOKEN:[[id number]],FOLLOWUP:[1 month]]

## 2022-11-15 PROCEDURE — 99232 SBSQ HOSP IP/OBS MODERATE 35: CPT

## 2022-11-15 RX ADMIN — NAFCILLIN 200 GRAM(S): 10 INJECTION, POWDER, FOR SOLUTION INTRAVENOUS at 17:20

## 2022-11-15 RX ADMIN — Medication 0.25 MILLIGRAM(S): at 09:25

## 2022-11-15 RX ADMIN — LISINOPRIL 40 MILLIGRAM(S): 2.5 TABLET ORAL at 06:39

## 2022-11-15 RX ADMIN — Medication 1 TABLET(S): at 12:29

## 2022-11-15 RX ADMIN — Medication 0.25 MILLIGRAM(S): at 17:45

## 2022-11-15 RX ADMIN — ENOXAPARIN SODIUM 40 MILLIGRAM(S): 100 INJECTION SUBCUTANEOUS at 21:33

## 2022-11-15 RX ADMIN — OXYCODONE HYDROCHLORIDE 20 MILLIGRAM(S): 5 TABLET ORAL at 08:06

## 2022-11-15 RX ADMIN — FAMOTIDINE 20 MILLIGRAM(S): 10 INJECTION INTRAVENOUS at 12:29

## 2022-11-15 RX ADMIN — OXYCODONE HYDROCHLORIDE 20 MILLIGRAM(S): 5 TABLET ORAL at 21:30

## 2022-11-15 RX ADMIN — OXYCODONE HYDROCHLORIDE 20 MILLIGRAM(S): 5 TABLET ORAL at 09:24

## 2022-11-15 RX ADMIN — GABAPENTIN 300 MILLIGRAM(S): 400 CAPSULE ORAL at 21:33

## 2022-11-15 RX ADMIN — NAFCILLIN 200 GRAM(S): 10 INJECTION, POWDER, FOR SOLUTION INTRAVENOUS at 12:29

## 2022-11-15 RX ADMIN — OXYCODONE HYDROCHLORIDE 20 MILLIGRAM(S): 5 TABLET ORAL at 20:35

## 2022-11-15 RX ADMIN — Medication 10 MILLIGRAM(S): at 15:34

## 2022-11-15 RX ADMIN — OXYCODONE HYDROCHLORIDE 15 MILLIGRAM(S): 5 TABLET ORAL at 00:39

## 2022-11-15 RX ADMIN — OXYCODONE HYDROCHLORIDE 20 MILLIGRAM(S): 5 TABLET ORAL at 08:27

## 2022-11-15 RX ADMIN — Medication 250 MILLIGRAM(S): at 17:21

## 2022-11-15 RX ADMIN — NAFCILLIN 200 GRAM(S): 10 INJECTION, POWDER, FOR SOLUTION INTRAVENOUS at 21:11

## 2022-11-15 RX ADMIN — OXYCODONE HYDROCHLORIDE 20 MILLIGRAM(S): 5 TABLET ORAL at 10:28

## 2022-11-15 RX ADMIN — TIZANIDINE 2 MILLIGRAM(S): 4 TABLET ORAL at 21:32

## 2022-11-15 RX ADMIN — CHLORHEXIDINE GLUCONATE 1 APPLICATION(S): 213 SOLUTION TOPICAL at 21:34

## 2022-11-15 RX ADMIN — OXYCODONE HYDROCHLORIDE 20 MILLIGRAM(S): 5 TABLET ORAL at 15:16

## 2022-11-15 RX ADMIN — NAFCILLIN 200 GRAM(S): 10 INJECTION, POWDER, FOR SOLUTION INTRAVENOUS at 22:57

## 2022-11-15 RX ADMIN — GABAPENTIN 300 MILLIGRAM(S): 400 CAPSULE ORAL at 06:39

## 2022-11-15 RX ADMIN — NAFCILLIN 200 GRAM(S): 10 INJECTION, POWDER, FOR SOLUTION INTRAVENOUS at 06:39

## 2022-11-15 RX ADMIN — Medication 10 MILLIGRAM(S): at 06:40

## 2022-11-15 RX ADMIN — OXYCODONE HYDROCHLORIDE 20 MILLIGRAM(S): 5 TABLET ORAL at 20:36

## 2022-11-15 RX ADMIN — TIZANIDINE 2 MILLIGRAM(S): 4 TABLET ORAL at 06:40

## 2022-11-15 RX ADMIN — GABAPENTIN 300 MILLIGRAM(S): 400 CAPSULE ORAL at 15:34

## 2022-11-15 RX ADMIN — Medication 10 MILLIGRAM(S): at 21:32

## 2022-11-15 RX ADMIN — TIZANIDINE 2 MILLIGRAM(S): 4 TABLET ORAL at 15:34

## 2022-11-15 RX ADMIN — Medication 250 MILLIGRAM(S): at 06:40

## 2022-11-15 RX ADMIN — OXYCODONE HYDROCHLORIDE 20 MILLIGRAM(S): 5 TABLET ORAL at 16:05

## 2022-11-15 RX ADMIN — SENNA PLUS 2 TABLET(S): 8.6 TABLET ORAL at 21:32

## 2022-11-15 RX ADMIN — NAFCILLIN 200 GRAM(S): 10 INJECTION, POWDER, FOR SOLUTION INTRAVENOUS at 02:10

## 2022-11-15 NOTE — PROGRESS NOTE ADULT - SUBJECTIVE AND OBJECTIVE BOX
Patient is a 52y old  Male who presents with a chief complaint of epidural abscess and lumbar lami     SUBJECTIVE & REVIEW OF SYMPTOMS:  patient seen and evaluated while sitting up in WC at bedside  tolerating therapy   high anxiety about dc process,      Denies CP, SOB, HOOK, HA, dizziness, nausea, dysuria.  Has full control of bowel and bladder                                  MEDICATIONS  (STANDING):  baclofen 10 milliGRAM(s) Oral every 8 hours  chlorhexidine 2% Cloths 1 Application(s) Topical daily  enoxaparin Injectable 40 milliGRAM(s) SubCutaneous every 24 hours  famotidine    Tablet 20 milliGRAM(s) Oral daily  gabapentin 300 milliGRAM(s) Oral three times a day  lisinopril 40 milliGRAM(s) Oral daily  multivitamin 1 Tablet(s) Oral daily  nafcillin  IVPB 2 Gram(s) IV Intermittent every 4 hours  oxyCODONE  ER Tablet 20 milliGRAM(s) Oral <User Schedule>  polyethylene glycol 3350 17 Gram(s) Oral daily  saccharomyces boulardii 250 milliGRAM(s) Oral two times a day  senna 2 Tablet(s) Oral at bedtime  tiZANidine 2 milliGRAM(s) Oral three times a day    MEDICATIONS  (PRN):  acetaminophen     Tablet .. 650 milliGRAM(s) Oral every 6 hours PRN Temp greater or equal to 38C (100.4F), Mild Pain (1 - 3)  ALPRAZolam 0.25 milliGRAM(s) Oral two times a day PRN anxiety  oxyCODONE    IR 15 milliGRAM(s) Oral every 4 hours PRN Moderate Pain (4 - 6)  oxyCODONE    IR 20 milliGRAM(s) Oral every 4 hours PRN Severe Pain (7 - 10)  simethicone 80 milliGRAM(s) Chew four times a day PRN Heartburn    Vital Signs Last 24 Hrs  T(C): 36.8 (15 Nov 2022 08:00), Max: 36.8 (15 Nov 2022 08:00)  T(F): 98.2 (15 Nov 2022 08:00), Max: 98.2 (15 Nov 2022 08:00)  HR: 83 (15 Nov 2022 08:00) (68 - 85)  BP: 146/105 (15 Nov 2022 08:00) (129/89 - 146/105)  BP(mean): --  RR: 16 (15 Nov 2022 08:00) (16 - 16)  SpO2: 93% (15 Nov 2022 08:00) (93% - 96%)    Parameters below as of 15 Nov 2022 08:00  Patient On (Oxygen Delivery Method): room air        physical exam:  Constitutional - NAD  Chest - resp non labored  Cardiovascular - warm and well perfused   Abdomen - mildly distended, tympanic throughout  Extremities - No C/C/E, No calf tenderness   Psychiatric - mildly anxious  MSK: no muscle atrophy bilateral shoulders  Neuro:- 4/5 bilateral lower extremities

## 2022-11-16 PROCEDURE — 99232 SBSQ HOSP IP/OBS MODERATE 35: CPT

## 2022-11-16 RX ORDER — ALPRAZOLAM 0.25 MG
0.25 TABLET ORAL
Refills: 0 | Status: DISCONTINUED | OUTPATIENT
Start: 2022-11-16 | End: 2022-11-17

## 2022-11-16 RX ADMIN — NAFCILLIN 200 GRAM(S): 10 INJECTION, POWDER, FOR SOLUTION INTRAVENOUS at 01:32

## 2022-11-16 RX ADMIN — NAFCILLIN 200 GRAM(S): 10 INJECTION, POWDER, FOR SOLUTION INTRAVENOUS at 05:45

## 2022-11-16 RX ADMIN — OXYCODONE HYDROCHLORIDE 20 MILLIGRAM(S): 5 TABLET ORAL at 21:14

## 2022-11-16 RX ADMIN — CHLORHEXIDINE GLUCONATE 1 APPLICATION(S): 213 SOLUTION TOPICAL at 21:14

## 2022-11-16 RX ADMIN — OXYCODONE HYDROCHLORIDE 20 MILLIGRAM(S): 5 TABLET ORAL at 10:16

## 2022-11-16 RX ADMIN — Medication 250 MILLIGRAM(S): at 17:42

## 2022-11-16 RX ADMIN — OXYCODONE HYDROCHLORIDE 20 MILLIGRAM(S): 5 TABLET ORAL at 12:40

## 2022-11-16 RX ADMIN — OXYCODONE HYDROCHLORIDE 20 MILLIGRAM(S): 5 TABLET ORAL at 08:52

## 2022-11-16 RX ADMIN — GABAPENTIN 300 MILLIGRAM(S): 400 CAPSULE ORAL at 21:15

## 2022-11-16 RX ADMIN — ENOXAPARIN SODIUM 40 MILLIGRAM(S): 100 INJECTION SUBCUTANEOUS at 21:15

## 2022-11-16 RX ADMIN — Medication 10 MILLIGRAM(S): at 21:16

## 2022-11-16 RX ADMIN — Medication 10 MILLIGRAM(S): at 05:46

## 2022-11-16 RX ADMIN — Medication 0.25 MILLIGRAM(S): at 07:28

## 2022-11-16 RX ADMIN — GABAPENTIN 300 MILLIGRAM(S): 400 CAPSULE ORAL at 05:49

## 2022-11-16 RX ADMIN — LISINOPRIL 40 MILLIGRAM(S): 2.5 TABLET ORAL at 05:45

## 2022-11-16 RX ADMIN — GABAPENTIN 300 MILLIGRAM(S): 400 CAPSULE ORAL at 14:58

## 2022-11-16 RX ADMIN — NAFCILLIN 200 GRAM(S): 10 INJECTION, POWDER, FOR SOLUTION INTRAVENOUS at 17:42

## 2022-11-16 RX ADMIN — OXYCODONE HYDROCHLORIDE 20 MILLIGRAM(S): 5 TABLET ORAL at 07:27

## 2022-11-16 RX ADMIN — Medication 250 MILLIGRAM(S): at 05:46

## 2022-11-16 RX ADMIN — OXYCODONE HYDROCHLORIDE 15 MILLIGRAM(S): 5 TABLET ORAL at 21:14

## 2022-11-16 RX ADMIN — SENNA PLUS 2 TABLET(S): 8.6 TABLET ORAL at 21:16

## 2022-11-16 RX ADMIN — OXYCODONE HYDROCHLORIDE 20 MILLIGRAM(S): 5 TABLET ORAL at 11:41

## 2022-11-16 RX ADMIN — TIZANIDINE 2 MILLIGRAM(S): 4 TABLET ORAL at 21:16

## 2022-11-16 RX ADMIN — POLYETHYLENE GLYCOL 3350 17 GRAM(S): 17 POWDER, FOR SOLUTION ORAL at 11:29

## 2022-11-16 RX ADMIN — Medication 10 MILLIGRAM(S): at 14:58

## 2022-11-16 RX ADMIN — NAFCILLIN 200 GRAM(S): 10 INJECTION, POWDER, FOR SOLUTION INTRAVENOUS at 14:58

## 2022-11-16 RX ADMIN — NAFCILLIN 200 GRAM(S): 10 INJECTION, POWDER, FOR SOLUTION INTRAVENOUS at 11:18

## 2022-11-16 RX ADMIN — OXYCODONE HYDROCHLORIDE 15 MILLIGRAM(S): 5 TABLET ORAL at 19:31

## 2022-11-16 RX ADMIN — TIZANIDINE 2 MILLIGRAM(S): 4 TABLET ORAL at 14:58

## 2022-11-16 RX ADMIN — FAMOTIDINE 20 MILLIGRAM(S): 10 INJECTION INTRAVENOUS at 11:29

## 2022-11-16 RX ADMIN — OXYCODONE HYDROCHLORIDE 20 MILLIGRAM(S): 5 TABLET ORAL at 19:31

## 2022-11-16 RX ADMIN — OXYCODONE HYDROCHLORIDE 20 MILLIGRAM(S): 5 TABLET ORAL at 08:16

## 2022-11-16 RX ADMIN — Medication 1 TABLET(S): at 11:29

## 2022-11-16 RX ADMIN — Medication 0.25 MILLIGRAM(S): at 19:31

## 2022-11-16 RX ADMIN — NAFCILLIN 200 GRAM(S): 10 INJECTION, POWDER, FOR SOLUTION INTRAVENOUS at 21:16

## 2022-11-16 RX ADMIN — TIZANIDINE 2 MILLIGRAM(S): 4 TABLET ORAL at 05:46

## 2022-11-16 NOTE — PROGRESS NOTE ADULT - SUBJECTIVE AND OBJECTIVE BOX
Patient is a 52y old  Male who presents with a chief complaint of epidural abscess and lumbar lami     SUBJECTIVE & REVIEW OF SYMPTOMS:  Patient seen and evaluated during PT session  Patient did steps with 1 HR.  Overall, good session of therapy - was able to do a lot.  Pain present but somewhat tolerable  Denies chest pain, palpitation, headache, dizziness, nausea, abd pain, dysuria.  LBM 11/15            Vital Signs Last 24 Hrs  T(C): 36.8 (15 Nov 2022 08:00), Max: 36.8 (15 Nov 2022 08:00)  T(F): 98.2 (15 Nov 2022 08:00), Max: 98.2 (15 Nov 2022 08:00)  HR: 83 (15 Nov 2022 08:00) (68 - 85)  BP: 146/105 (15 Nov 2022 08:00) (129/89 - 146/105)  RR: 16 (15 Nov 2022 08:00) (16 - 16)  SpO2: 93% (15 Nov 2022 08:00) (93% - 96%)               12.7   5.05  )-----------( 452      ( 14 Nov 2022 06:40 )             39.6     11-14    141  |  107  |  15  ----------------------------<  91  3.9   |  23  |  0.85    Ca    9.5      14 Nov 2022 06:40    TPro  7.2  /  Alb  2.8<L>  /  TBili  0.9  /  DBili  x   /  AST  12  /  ALT  27  /  AlkPhos  100  11-14      Physical Exam:  Constitutional - NAD  Chest - resp non labored  Cardiovascular - warm and well perfused   Abdomen - soft, rounded, nontender  Extremities - No C/C/E, No calf tenderness   Psychiatric - mildly anxious  Neuro:- grossly 5/5, limited by pain at times     Patient is a 52y old  Male who presents with a chief complaint of epidural abscess and lumbar lami     SUBJECTIVE & REVIEW OF SYMPTOMS:  Patient seen and evaluated during PT session  Patient did steps with 1 HR.  Overall, good session of therapy - was able to do a lot.  Pain present but somewhat tolerable  Denies chest pain, palpitation, headache, dizziness, nausea, abd pain, dysuria.  LBM 11/15            Vital Signs Last 24 Hrs  T(C): 36.7 (16 Nov 2022 08:49), Max: 36.7 (16 Nov 2022 08:49)  T(F): 98.1 (16 Nov 2022 08:49), Max: 98.1 (16 Nov 2022 08:49)  HR: 105 (16 Nov 2022 08:49) (80 - 105)  BP: 130/87 (16 Nov 2022 08:49) (124/76 - 130/87)  RR: 16 (16 Nov 2022 08:49) (16 - 18)  SpO2: 96% (16 Nov 2022 08:49) (95% - 96%)               12.7   5.05  )-----------( 452      ( 14 Nov 2022 06:40 )             39.6     11-14    141  |  107  |  15  ----------------------------<  91  3.9   |  23  |  0.85    Ca    9.5      14 Nov 2022 06:40    TPro  7.2  /  Alb  2.8<L>  /  TBili  0.9  /  DBili  x   /  AST  12  /  ALT  27  /  AlkPhos  100  11-14      Physical Exam:  Constitutional - NAD  Chest - resp non labored  Cardiovascular - warm and well perfused   Abdomen - soft, rounded, nontender  Extremities - No C/C/E, No calf tenderness   Psychiatric - mildly anxious  Neuro:- grossly 5/5, limited by pain at times

## 2022-11-16 NOTE — CHART NOTE - NSCHARTNOTEFT_GEN_A_CORE
Nutrition Follow Up Note  Hospital Course   (Per Electronic Medical Record)    Source:  Patient [X]  Medical Record [X]      Diet:   Regular Diet (IDDSI Level 7) w/ Thin Liquids (IDDSI Level 0)  Tolerates Diet Consistency Well  Consumes % of Meals (as Per Documentation)   on Ensure Plant Based 8oz PO BID (Provides 360kcal & 40grams of Protein)   Patient Takes Nutrition Supplement Well (Per Patient Observation)     Enteral/Parenteral Nutrition: Not Applicable    Current Weight: 197.7lb on 11/6  Obtain New Weight to Confirm  Obtain Weights Weekly     Pertinent Medications: MEDICATIONS  (STANDING):  baclofen 10 milliGRAM(s) Oral every 8 hours  chlorhexidine 2% Cloths 1 Application(s) Topical daily  enoxaparin Injectable 40 milliGRAM(s) SubCutaneous every 24 hours  famotidine    Tablet 20 milliGRAM(s) Oral daily  gabapentin 300 milliGRAM(s) Oral three times a day  lisinopril 40 milliGRAM(s) Oral daily  multivitamin 1 Tablet(s) Oral daily  nafcillin  IVPB 2 Gram(s) IV Intermittent every 4 hours  oxyCODONE  ER Tablet 20 milliGRAM(s) Oral <User Schedule>  polyethylene glycol 3350 17 Gram(s) Oral daily  saccharomyces boulardii 250 milliGRAM(s) Oral two times a day  senna 2 Tablet(s) Oral at bedtime  tiZANidine 2 milliGRAM(s) Oral three times a day    MEDICATIONS  (PRN):  acetaminophen     Tablet .. 650 milliGRAM(s) Oral every 6 hours PRN Temp greater or equal to 38C (100.4F), Mild Pain (1 - 3)  ALPRAZolam 0.25 milliGRAM(s) Oral two times a day PRN anxiety  oxyCODONE    IR 15 milliGRAM(s) Oral every 4 hours PRN Moderate Pain (4 - 6)  oxyCODONE    IR 20 milliGRAM(s) Oral every 4 hours PRN Severe Pain (7 - 10)  simethicone 80 milliGRAM(s) Chew four times a day PRN Heartburn    Pertinent Labs:  11-14 Na141 mmol/L Glu 91 mg/dL K+ 3.9 mmol/L Cr  0.85 mg/dL BUN 15 mg/dL 11-14 Alb 2.8 g/dL<L>    Skin: No Pressure Ulcers   Surgical Incision on Spine   (as Per Nursing Flow Sheet)    Edema: None Noted (as Per Documentation)     Last Bowel Movement: on 11/15    Estimated Needs:   [X] No Change Since Previous Assessment    Previous Nutrition Diagnosis:   Severe Malnutrition     Nutrition Diagnosis is [X] Ongoing - Continues on Nutrition Supplement & Patient Takes Nutrition Supplement Well    New Nutrition Diagnosis: [X] Not Applicable    Interventions:   1. Recommend Continue Nutrition Plan of Care     Monitoring & Evaluation:   [X] Weights   [X] PO Intake   [X] Skin Integrity   [X] Follow Up (Per Protocol)  [X] Tolerance to Diet Prescription   [X] Other: Labs    Registered Dietitian/Nutritionist Remains Available.  Kali Ortega RDN    Phone# [phone]

## 2022-11-16 NOTE — PROGRESS NOTE ADULT - ASSESSMENT
51 YO male with PMH of lumbar Spinal Stenosis, Kidney Stone, and chronic back pain admitted to rehab after recent hospitalisation for increased pain and finding of Bacteremia Blood cx 's + GPC MSSA , L2- S1 laminectomy and evacuation of epidural abscess and washout on 10/22.. s/p Right shoulder subacromial injection 10/17/2022.      Continue  Comprehensive Rehab Program: PT/OT, 3hours daily and 5 days weekly      #Sepsis, Lumbar epidural abscess MSSA bacteremia, - s/p L2-S1 laminectomy and washout on 10/22/22- Further chart review of ID notes from outside hospital also shows bilateral psoas abscess    - Naficillin 2G Q 4hrs till 12/3 + Probiotics  - Left  UE PICC line inserted on 10/30  - Weekly ESR, CRP, CMP, and CBC - ESR/CRP downtrending    #HTN:  - lisinopril 40mg daily    #Pain management  - Tylenol PRN, Oxycodone 15-20 PRN, Oxycontin 20 BID, Neurontin 300 TID  - baclofen 10mg tid prn spasms   - Tizanidine 2mg TID  - May use TENS to right SI region prn     #Mood: anxiety about current medical course and his mother's condition  - Neuropsych consult appreciated  - Xanax 0.25mg bid prn      #DVT ppx  - Heparin TID -> Lovenox QD  - TEDs    #GI ppx  - Pepcid 20mg     #Bowel Regimen:  - Senna, miralax   - moviprep + enema (11/7)     #Bladder management:   - continent. voiding without difficulty    # Diet:   - Regular    #Skin:  - Skin on admission: Lumbar spine incision healing well - staples removed 11/8  - Pressure injury/Skin: Turn Q2hrs while in bed, OOB to Chair, PT/OT     Precaution: - Fall, Spinal  LSO when OOB with therapy  and activity ( Per ortho )     IDT 11/9  Disp: 11/15  DORY 51 YO male with PMH of lumbar Spinal Stenosis, Kidney Stone, and chronic back pain admitted to rehab after recent hospitalisation for increased pain and finding of Bacteremia Blood cx 's + GPC MSSA , L2- S1 laminectomy and evacuation of epidural abscess and washout on 10/22.. s/p Right shoulder subacromial injection 10/17/2022.      Continue  Comprehensive Rehab Program: PT/OT, 3hours daily and 5 days weekly      #Sepsis, Lumbar epidural abscess MSSA bacteremia, - s/p L2-S1 laminectomy and washout on 10/22/22- Further chart review of ID notes from outside hospital also shows bilateral psoas abscess    - Naficillin 2G Q 4hrs till 12/3 + Probiotics  - Left  UE PICC line inserted on 10/30  - Weekly ESR, CRP, CMP, and CBC - ESR/CRP downtrending    #HTN:  - lisinopril 40mg daily    #Pain management  - Tylenol PRN, Oxycodone 15-20 PRN, Oxycontin 20 BID, Neurontin 300 TID  - baclofen 10mg tid prn spasms   - Tizanidine 2mg TID  - May use TENS to right SI region prn     #Mood: anxiety about current medical course and his mother's condition  - Neuropsych consult appreciated  - Xanax 0.25mg bid prn      #DVT ppx  - Heparin TID -> Lovenox QD  - TEDs    #GI ppx  - Pepcid 20mg     #Bowel Regimen:  - Senna, miralax   - moviprep + enema (11/7)     #Bladder management:   - continent. voiding without difficulty    # Diet:   - Regular    #Skin:  - Skin on admission: Lumbar spine incision healing well - staples removed 11/8  - Pressure injury/Skin: Turn Q2hrs while in bed, OOB to Chair, PT/OT     Precaution: - Fall, Spinal  LSO when OOB with therapy  and activity ( Per ortho )     IDT 11/9  Functional: fluctuates between CG - mod A depending on mood.  Overall min A in OT and can probably do more (self limiting)  Disp: 11/15 home vs DORY - pending insurance auth   51 YO male with PMH of lumbar Spinal Stenosis, Kidney Stone, and chronic back pain admitted to rehab after recent hospitalisation for increased pain and finding of Bacteremia Blood cx 's + GPC MSSA , L2- S1 laminectomy and evacuation of epidural abscess and washout on 10/22.. s/p Right shoulder subacromial injection 10/17/2022.      Continue  Comprehensive Rehab Program: PT/OT, 3hours daily and 5 days weekly      #Sepsis, Lumbar epidural abscess MSSA bacteremia, - s/p L2-S1 laminectomy and washout on 10/22/22- Further chart review of ID notes from outside hospital also shows bilateral psoas abscess    - Naficillin 2G Q 4hrs till 12/3 + Probiotics  - Left  UE PICC line inserted on 10/30  - Weekly ESR, CRP, CMP, and CBC - ESR/CRP downtrending    #HTN:  - lisinopril 40mg daily    #Pain management  - Tylenol PRN, Oxycodone 15-20 PRN, Oxycontin 20 BID, Neurontin 300 TID  - baclofen 10mg tid prn spasms   - Tizanidine 2mg TID  - May use TENS to right SI region prn     #Mood: anxiety about current medical course and his mother's condition  - Neuropsych consult appreciated  - Xanax 0.25mg bid prn      #DVT ppx  - Heparin TID -> Lovenox QD  - TEDs    #GI ppx  - Pepcid 20mg     #Bowel Regimen:  - Senna, miralax   - moviprep + enema (11/7)     #Bladder management:   - continent. voiding without difficulty    # Diet:   - Regular    #Skin:  - Skin on admission: Lumbar spine incision healing well - staples removed 11/8  - Pressure injury/Skin: Turn Q2hrs while in bed, OOB to Chair, PT/OT     Precaution: - Fall, Spinal  LSO when OOB with therapy  and activity ( Per ortho )     IDT 11/16 - lead by Dr. Thomas  Functional progress: overall CG with self care and transfers.  Amb 100' x 2, did 8 steps 1 HR min A  TDD: 11/17 D[facility] - pending insurance auth

## 2022-11-16 NOTE — CHART NOTE - NSCHARTNOTEFT_GEN_A_CORE
Patient seen for brief (<5 minute) supportive check-in session. He is seated at bedside and is on a personal phone call. He indicates readiness for upcoming discharge and greater awareness of negative impact that anxiety has on his daily functioning. Briefly reinforced continued use of adaptive coping strategies for managing heightened stress. Neuropsychology remains available.

## 2022-11-17 ENCOUNTER — TRANSCRIPTION ENCOUNTER (OUTPATIENT)
Age: 52
End: 2022-11-17

## 2022-11-17 VITALS
SYSTOLIC BLOOD PRESSURE: 142 MMHG | OXYGEN SATURATION: 95 % | DIASTOLIC BLOOD PRESSURE: 94 MMHG | RESPIRATION RATE: 15 BRPM | HEART RATE: 94 BPM | TEMPERATURE: 98 F

## 2022-11-17 LAB
ALBUMIN SERPL ELPH-MCNC: 2.7 G/DL — LOW (ref 3.3–5)
ALP SERPL-CCNC: 98 U/L — SIGNIFICANT CHANGE UP (ref 40–120)
ALT FLD-CCNC: 39 U/L — SIGNIFICANT CHANGE UP (ref 10–45)
ANION GAP SERPL CALC-SCNC: 8 MMOL/L — SIGNIFICANT CHANGE UP (ref 5–17)
AST SERPL-CCNC: 25 U/L — SIGNIFICANT CHANGE UP (ref 10–40)
BILIRUB SERPL-MCNC: 1 MG/DL — SIGNIFICANT CHANGE UP (ref 0.2–1.2)
BUN SERPL-MCNC: 15 MG/DL — SIGNIFICANT CHANGE UP (ref 7–23)
CALCIUM SERPL-MCNC: 9.3 MG/DL — SIGNIFICANT CHANGE UP (ref 8.4–10.5)
CHLORIDE SERPL-SCNC: 108 MMOL/L — SIGNIFICANT CHANGE UP (ref 96–108)
CO2 SERPL-SCNC: 27 MMOL/L — SIGNIFICANT CHANGE UP (ref 22–31)
CREAT SERPL-MCNC: 0.81 MG/DL — SIGNIFICANT CHANGE UP (ref 0.5–1.3)
EGFR: 105 ML/MIN/1.73M2 — SIGNIFICANT CHANGE UP
GLUCOSE SERPL-MCNC: 100 MG/DL — HIGH (ref 70–99)
HCT VFR BLD CALC: 37.5 % — LOW (ref 39–50)
HGB BLD-MCNC: 11.9 G/DL — LOW (ref 13–17)
MCHC RBC-ENTMCNC: 30.9 PG — SIGNIFICANT CHANGE UP (ref 27–34)
MCHC RBC-ENTMCNC: 31.7 GM/DL — LOW (ref 32–36)
MCV RBC AUTO: 97.4 FL — SIGNIFICANT CHANGE UP (ref 80–100)
NRBC # BLD: 0 /100 WBCS — SIGNIFICANT CHANGE UP (ref 0–0)
PLATELET # BLD AUTO: 444 K/UL — HIGH (ref 150–400)
POTASSIUM SERPL-MCNC: 4.2 MMOL/L — SIGNIFICANT CHANGE UP (ref 3.5–5.3)
POTASSIUM SERPL-SCNC: 4.2 MMOL/L — SIGNIFICANT CHANGE UP (ref 3.5–5.3)
PROT SERPL-MCNC: 7 G/DL — SIGNIFICANT CHANGE UP (ref 6–8.3)
RBC # BLD: 3.85 M/UL — LOW (ref 4.2–5.8)
RBC # FLD: 13.4 % — SIGNIFICANT CHANGE UP (ref 10.3–14.5)
SARS-COV-2 RNA SPEC QL NAA+PROBE: SIGNIFICANT CHANGE UP
SODIUM SERPL-SCNC: 143 MMOL/L — SIGNIFICANT CHANGE UP (ref 135–145)
WBC # BLD: 4.74 K/UL — SIGNIFICANT CHANGE UP (ref 3.8–10.5)
WBC # FLD AUTO: 4.74 K/UL — SIGNIFICANT CHANGE UP (ref 3.8–10.5)

## 2022-11-17 PROCEDURE — 94640 AIRWAY INHALATION TREATMENT: CPT

## 2022-11-17 PROCEDURE — 86140 C-REACTIVE PROTEIN: CPT

## 2022-11-17 PROCEDURE — 87635 SARS-COV-2 COVID-19 AMP PRB: CPT

## 2022-11-17 PROCEDURE — 97163 PT EVAL HIGH COMPLEX 45 MIN: CPT

## 2022-11-17 PROCEDURE — 71045 X-RAY EXAM CHEST 1 VIEW: CPT

## 2022-11-17 PROCEDURE — 85027 COMPLETE CBC AUTOMATED: CPT

## 2022-11-17 PROCEDURE — 85652 RBC SED RATE AUTOMATED: CPT

## 2022-11-17 PROCEDURE — 36415 COLL VENOUS BLD VENIPUNCTURE: CPT

## 2022-11-17 PROCEDURE — 84443 ASSAY THYROID STIM HORMONE: CPT

## 2022-11-17 PROCEDURE — 97530 THERAPEUTIC ACTIVITIES: CPT

## 2022-11-17 PROCEDURE — 93005 ELECTROCARDIOGRAM TRACING: CPT

## 2022-11-17 PROCEDURE — 97110 THERAPEUTIC EXERCISES: CPT

## 2022-11-17 PROCEDURE — 97112 NEUROMUSCULAR REEDUCATION: CPT

## 2022-11-17 PROCEDURE — U0005: CPT

## 2022-11-17 PROCEDURE — 80053 COMPREHEN METABOLIC PANEL: CPT

## 2022-11-17 PROCEDURE — 85025 COMPLETE CBC W/AUTO DIFF WBC: CPT

## 2022-11-17 PROCEDURE — 97116 GAIT TRAINING THERAPY: CPT

## 2022-11-17 PROCEDURE — 99233 SBSQ HOSP IP/OBS HIGH 50: CPT

## 2022-11-17 PROCEDURE — 97535 SELF CARE MNGMENT TRAINING: CPT

## 2022-11-17 PROCEDURE — 80076 HEPATIC FUNCTION PANEL: CPT

## 2022-11-17 PROCEDURE — 74019 RADEX ABDOMEN 2 VIEWS: CPT

## 2022-11-17 PROCEDURE — 97167 OT EVAL HIGH COMPLEX 60 MIN: CPT

## 2022-11-17 PROCEDURE — U0003: CPT

## 2022-11-17 RX ORDER — OXYCODONE HYDROCHLORIDE 5 MG/1
15 TABLET ORAL EVERY 4 HOURS
Refills: 0 | Status: DISCONTINUED | OUTPATIENT
Start: 2022-11-17 | End: 2022-11-17

## 2022-11-17 RX ADMIN — POLYETHYLENE GLYCOL 3350 17 GRAM(S): 17 POWDER, FOR SOLUTION ORAL at 12:18

## 2022-11-17 RX ADMIN — GABAPENTIN 300 MILLIGRAM(S): 400 CAPSULE ORAL at 13:51

## 2022-11-17 RX ADMIN — Medication 10 MILLIGRAM(S): at 05:19

## 2022-11-17 RX ADMIN — TIZANIDINE 2 MILLIGRAM(S): 4 TABLET ORAL at 05:18

## 2022-11-17 RX ADMIN — Medication 0.25 MILLIGRAM(S): at 06:49

## 2022-11-17 RX ADMIN — GABAPENTIN 300 MILLIGRAM(S): 400 CAPSULE ORAL at 05:19

## 2022-11-17 RX ADMIN — NAFCILLIN 200 GRAM(S): 10 INJECTION, POWDER, FOR SOLUTION INTRAVENOUS at 05:18

## 2022-11-17 RX ADMIN — OXYCODONE HYDROCHLORIDE 20 MILLIGRAM(S): 5 TABLET ORAL at 10:52

## 2022-11-17 RX ADMIN — OXYCODONE HYDROCHLORIDE 20 MILLIGRAM(S): 5 TABLET ORAL at 08:23

## 2022-11-17 RX ADMIN — OXYCODONE HYDROCHLORIDE 20 MILLIGRAM(S): 5 TABLET ORAL at 14:05

## 2022-11-17 RX ADMIN — NAFCILLIN 200 GRAM(S): 10 INJECTION, POWDER, FOR SOLUTION INTRAVENOUS at 11:09

## 2022-11-17 RX ADMIN — NAFCILLIN 200 GRAM(S): 10 INJECTION, POWDER, FOR SOLUTION INTRAVENOUS at 13:43

## 2022-11-17 RX ADMIN — Medication 1 TABLET(S): at 12:18

## 2022-11-17 RX ADMIN — LISINOPRIL 40 MILLIGRAM(S): 2.5 TABLET ORAL at 05:19

## 2022-11-17 RX ADMIN — Medication 10 MILLIGRAM(S): at 13:51

## 2022-11-17 RX ADMIN — Medication 250 MILLIGRAM(S): at 05:18

## 2022-11-17 RX ADMIN — TIZANIDINE 2 MILLIGRAM(S): 4 TABLET ORAL at 13:51

## 2022-11-17 RX ADMIN — FAMOTIDINE 20 MILLIGRAM(S): 10 INJECTION INTRAVENOUS at 12:17

## 2022-11-17 RX ADMIN — NAFCILLIN 200 GRAM(S): 10 INJECTION, POWDER, FOR SOLUTION INTRAVENOUS at 01:49

## 2022-11-17 RX ADMIN — OXYCODONE HYDROCHLORIDE 20 MILLIGRAM(S): 5 TABLET ORAL at 06:32

## 2022-11-17 NOTE — DISCHARGE NOTE NURSING/CASE MANAGEMENT/SOCIAL WORK - PATIENT PORTAL LINK FT
You can access the FollowMyHealth Patient Portal offered by [facility] by registering at the following website: [email or web address]. By joining [facility]’s FollowMyHealth portal, you will also be able to view your health information using other applications (apps) compatible with our system.

## 2022-11-17 NOTE — DISCHARGE NOTE NURSING/CASE MANAGEMENT/SOCIAL WORK - NSDCPEFALRISK_GEN_ALL_CORE
For information on Fall & Injury Prevention, visit: [email or web address] OR  [email or web address] OR  https://www.cdc.gov/steadi/patient.html

## 2022-11-17 NOTE — PROGRESS NOTE ADULT - SUBJECTIVE AND OBJECTIVE BOX
52y old  Male who presents with a chief complaint of Lumbar Laminectomy     Patient seen and examined at bedside, feel better today,   c/o pain in the  lower back, 4/10, aggravated with movements, pain has improved since admission  no fever, chills, cough, sob, cp, abd pain.      Vital Signs Last 24 Hrs  T(C): 36.7 (17 Nov 2022 09:08), Max: 37.1 (16 Nov 2022 19:35)  T(F): 98 (17 Nov 2022 09:08), Max: 98.7 (16 Nov 2022 19:35)  HR: 94 (17 Nov 2022 09:08) (84 - 94)  BP: 142/94 (17 Nov 2022 09:08) (142/94 - 147/86)  BP(mean): --  RR: 15 (17 Nov 2022 09:08) (15 - 16)  SpO2: 95% (17 Nov 2022 09:08) (95% - 97%)    Parameters below as of 16 Nov 2022 19:35  Patient On (Oxygen Delivery Method): room air      GENERAL- NAD  EAR/NOSE/MOUTH/THROAT - no pharyngeal exudates, no oral lesion's  MMM  EYES- MERRICK, conjunctiva and Sclera clear  NECK- supple  RESPIRATORY-  clear to auscultation bilaterally, non laboured breathing  CARDIOVASCULAR - SIS2, RRR  GI - soft NT BS present  EXTREMITIES- no pedal edema  NEUROLOGY- B/L LE WEAKNESS- improving  PSYCHIATRY- AAO X 3                 11.9                 143  | 27   | 15           4.74  >-----------< 444     ------------------------< 100                   37.5                 4.2  | 108  | 0.81                                         Ca 9.3   Mg x     Ph x        MEDICATIONS  (STANDING):  baclofen 10 milliGRAM(s) Oral every 8 hours  chlorhexidine 2% Cloths 1 Application(s) Topical daily  enoxaparin Injectable 40 milliGRAM(s) SubCutaneous every 24 hours  famotidine    Tablet 20 milliGRAM(s) Oral daily  gabapentin 300 milliGRAM(s) Oral three times a day  lisinopril 40 milliGRAM(s) Oral daily  multivitamin 1 Tablet(s) Oral daily  nafcillin  IVPB 2 Gram(s) IV Intermittent every 4 hours  oxyCODONE  ER Tablet 20 milliGRAM(s) Oral <User Schedule>  polyethylene glycol 3350 17 Gram(s) Oral daily  saccharomyces boulardii 250 milliGRAM(s) Oral two times a day  senna 2 Tablet(s) Oral at bedtime  tiZANidine 2 milliGRAM(s) Oral three times a day    MEDICATIONS  (PRN):  acetaminophen     Tablet .. 650 milliGRAM(s) Oral every 6 hours PRN Temp greater or equal to 38C (100.4F), Mild Pain (1 - 3)  ALPRAZolam 0.25 milliGRAM(s) Oral two times a day PRN anxiety  oxyCODONE    IR 15 milliGRAM(s) Oral every 4 hours PRN Moderate Pain (4 - 6)  oxyCODONE    IR 20 milliGRAM(s) Oral every 4 hours PRN Severe Pain (7 - 10)  simethicone 80 milliGRAM(s) Chew four times a day PRN Heartburn

## 2022-11-17 NOTE — PROGRESS NOTE ADULT - ASSESSMENT
51 YO male with PMH of lumbar Spinal Stenosis, Kidney Stone, and chronic back pain admitted to rehab after recent hospitalisation for increased pain and finding of Bacteremia Blood cx 's + GPC MSSA , L2- S1 laminectomy and evacuation of epidural abscess and washout on 10/22.. s/p Right shoulder subacromial injection 10/17/2022.      Continue  Comprehensive Rehab Program: PT/OT, 3hours daily and 5 days weekly      #Sepsis, Lumbar epidural abscess MSSA bacteremia, - s/p L2-S1 laminectomy and washout on 10/22/22- Further chart review of ID notes from outside hospital also shows bilateral psoas abscess    - Naficillin 2G Q 4hrs till 12/3 + Probiotics  - Left  UE PICC line inserted on 10/30  - Weekly ESR, CRP, CMP, and CBC - ESR/CRP downtrendin/14 ESR 89, CRP 6    #HTN:  - lisinopril 40mg daily    #Pain management  - Tylenol PRN, Oxycodone 15-20 PRN, Oxycontin 20 BID, Neurontin 300 TID  - baclofen 10mg tid prn spasms   - Tizanidine 2mg TID  - May use TENS to right SI region prn     #Mood: anxiety about current medical course and his mother's condition  - Neuropsych following  - Xanax 0.25mg bid prn      #DVT ppx  - Heparin TID -> Lovenox QD  - TEDs    #GI ppx  - Pepcid 20mg     #Bowel Regimen:  - Senna, miralax   - moviprep + enema ()     #Bladder management:   - continent. voiding without difficulty    # Diet:   - Regular    #Skin:  - Skin on admission: Lumbar spine incision healing well - staples removed   - Pressure injury/Skin: Turn Q2hrs while in bed, OOB to Chair, PT/OT     Precaution: - Fall, Spinal  LSO when OOB with therapy  and activity ( Per ortho )     IDT   Functional progress: overall CG with self care and transfers.  Amb 100' x 2, did 8 steps 1 HR min A  TDD:  DORY

## 2022-11-17 NOTE — PROGRESS NOTE ADULT - PROVIDER SPECIALTY LIST ADULT
Neuropsychology
Rehab Medicine
Rehab Medicine
Hospitalist
Neuropsychology
Rehab Medicine
Rehab Medicine
Hospitalist
Hospitalist
Neuropsychology
Rehab Medicine
Hospitalist
Hospitalist
Rehab Medicine
Hospitalist
Rehab Medicine

## 2022-11-17 NOTE — PROGRESS NOTE ADULT - SUBJECTIVE AND OBJECTIVE BOX
Patient is a 52y old  Male who presents with a chief complaint of epidural abscess and lumbar lami     SUBJECTIVE & REVIEW OF SYMPTOMS:  Patient seen and evaluated while sitting in WC at bedside  Slept well overnight  in good spirit, aware of insurance approval and likely discharge to [facility] today  Pain status quo - has it's bad and better times.  Managed on narcotics (ER/IR and antispasmodic)  Denies CP, palpitation, SOB, HA, dizziness, nausea, abd pain, or dysuria.  LBM 11/16            Vital Signs Last 24 Hrs  T(C): 36.7 (11-17-22 @ 09:08), Max: 37.1 (11-16-22 @ 19:35)  T(F): 98 (11-17-22 @ 09:08), Max: 98.7 (11-16-22 @ 19:35)  HR: 94 (11-17-22 @ 09:08) (84 - 94)  BP: 142/94 (11-17-22 @ 09:08) (142/94 - 147/86)  RR: 15 (11-17-22 @ 09:08) (15 - 16)  SpO2: 95% (11-17-22 @ 09:08) (95% - 97%)      LAB                        11.9   4.74  )-----------( 444      ( 17 Nov 2022 06:40 )             37.5     11-17    143  |  108  |  15  ----------------------------<  100<H>  4.2   |  27  |  0.81    Ca    9.3      17 Nov 2022 06:40    TPro  7.0  /  Alb  2.7<L>  /  TBili  1.0  /  DBili  x   /  AST  25  /  ALT  39  /  AlkPhos  98  11-17    LIVER FUNCTIONS - ( 17 Nov 2022 06:40 )  Alb: 2.7 g/dL / Pro: 7.0 g/dL / ALK PHOS: 98 U/L / ALT: 39 U/L / AST: 25 U/L / GGT: x           Physical Exam:  Constitutional - NAD  Chest - resp non labored  Cardiovascular - warm and well perfused   Abdomen - soft, rounded, nontender  Extremities - Cap refill at 3 second to feed; trace edema (like dependent). No calf tenderness   Psychiatric - mildly anxious  Neuro:- grossly 5/5, limited by pain at times

## 2022-11-17 NOTE — PROGRESS NOTE ADULT - REASON FOR ADMISSION
Epidural abscess   Lumbar Laminectomy and evacuation of abscess
Lumbar Laminectomy
discitis Lumbar Laminectomy
Lumbar Laminectomy

## 2022-11-17 NOTE — PROGRESS NOTE ADULT - ASSESSMENT
51 YO male with PMH of lumbar Spinal Stenosis, Kidney Stone, and chronic back pain due to sports injuries who presented to [facility] on 10/14 with c/o Right Sided Shoulder Pain and acute on chronic Lower back pain. Shoulder MRI with Mildly displaced anterior glenoid labrum extending from the three to 4:00 position with evidence of an ALPSA lesion. Soft tissue swelling and edema seen in the region of the distal clavicle involving the trapezius musculature. s/p Right shoulder subacromial injection 10/17/2022. Hospital course significant for sepsis, no on long term IV ABX. Patient now with gait Instability, ADL impairments and Functional impairments.    #Mildly displaced anterior glenoid labrum   #Lumbar Spinal Stenosis  - s/p Right shoulder subacromial injection 10/17/2022  - s/p L2-S1 laminectomy and washout on 10/22/22    #MSSA bacteremia due to epidural abscess and bilateral psoas abscess  - s/p wound washout   - repeat blood cx on 10/22 negative  - Naficillin 2G Q 4hrs till 12/3  - Left DL PICC line inserted on 10/30    #HTN  - lisinopril   - control pain    #DVT ppx  - Heparin    #GI ppx  - Pepcid     will follow  d/w dr. rizzo

## 2022-11-17 NOTE — PROGRESS NOTE ADULT - NUTRITIONAL ASSESSMENT
This patient has been assessed with a concern for Malnutrition and has been determined to have a diagnosis/diagnoses of Severe protein-calorie malnutrition.    This patient is being managed with:   Diet Regular-  Supplement Feeding Modality:  Oral  Ensure Plant-Based Cans or Servings Per Day:  1       Frequency:  Two Times a day  Entered: Nov 1 2022  1:20PM    
This patient has been assessed with a concern for Malnutrition and has been determined to have a diagnosis/diagnoses of Severe protein-calorie malnutrition.    This patient is being managed with:   Diet Regular-  Supplement Feeding Modality:  Oral  Ensure Plant-Based Cans or Servings Per Day:  1       Frequency:  Two Times a day  Entered: Nov 1 2022  1:20PM      This patient has been assessed with a concern for Malnutrition and has been determined to have a diagnosis/diagnoses of Severe protein-calorie malnutrition.    This patient is being managed with:   Diet Regular-  Supplement Feeding Modality:  Oral  Ensure Plant-Based Cans or Servings Per Day:  1       Frequency:  Two Times a day  Entered: Nov 1 2022  1:20PM

## 2022-11-19 ENCOUNTER — RESULT REVIEW (OUTPATIENT)
Age: 52
End: 2022-11-19

## 2024-02-29 ENCOUNTER — TRANSCRIPTION ENCOUNTER (OUTPATIENT)
Age: 54
End: 2024-02-29

## 2024-03-01 ENCOUNTER — EMERGENCY (EMERGENCY)
Facility: HOSPITAL | Age: 54
LOS: 1 days | Discharge: HOME | End: 2024-03-01
Attending: EMERGENCY MEDICINE
Payer: MEDICAID

## 2024-03-01 VITALS
TEMPERATURE: 97 F | DIASTOLIC BLOOD PRESSURE: 94 MMHG | OXYGEN SATURATION: 100 % | HEIGHT: 73 IN | RESPIRATION RATE: 20 BRPM | WEIGHT: 190.04 LBS | HEART RATE: 76 BPM | SYSTOLIC BLOOD PRESSURE: 128 MMHG

## 2024-03-01 VITALS
RESPIRATION RATE: 16 BRPM | OXYGEN SATURATION: 98 % | SYSTOLIC BLOOD PRESSURE: 115 MMHG | HEART RATE: 75 BPM | DIASTOLIC BLOOD PRESSURE: 84 MMHG

## 2024-03-01 DIAGNOSIS — Z98.890 OTHER SPECIFIED POSTPROCEDURAL STATES: Chronic | ICD-10-CM

## 2024-03-01 LAB
ALBUMIN SERPL ELPH-MCNC: 4.8 G/DL — SIGNIFICANT CHANGE UP (ref 3.3–5)
ALP SERPL-CCNC: 94 U/L — SIGNIFICANT CHANGE UP (ref 40–120)
ALT FLD-CCNC: 25 U/L — SIGNIFICANT CHANGE UP (ref 10–45)
ANION GAP SERPL CALC-SCNC: 16 MMOL/L — SIGNIFICANT CHANGE UP (ref 5–17)
APTT BLD: 32.8 SEC — SIGNIFICANT CHANGE UP (ref 24.5–35.6)
AST SERPL-CCNC: 19 U/L — SIGNIFICANT CHANGE UP (ref 10–40)
BASOPHILS # BLD AUTO: 0.06 K/UL — SIGNIFICANT CHANGE UP (ref 0–0.2)
BASOPHILS NFR BLD AUTO: 0.9 % — SIGNIFICANT CHANGE UP (ref 0–2)
BILIRUB SERPL-MCNC: 1.5 MG/DL — HIGH (ref 0.2–1.2)
BLD GP AB SCN SERPL QL: NEGATIVE — SIGNIFICANT CHANGE UP
BUN SERPL-MCNC: 21 MG/DL — SIGNIFICANT CHANGE UP (ref 7–23)
CALCIUM SERPL-MCNC: 9.6 MG/DL — SIGNIFICANT CHANGE UP (ref 8.4–10.5)
CHLORIDE SERPL-SCNC: 100 MMOL/L — SIGNIFICANT CHANGE UP (ref 96–108)
CO2 SERPL-SCNC: 21 MMOL/L — LOW (ref 22–31)
CREAT SERPL-MCNC: 1 MG/DL — SIGNIFICANT CHANGE UP (ref 0.5–1.3)
EGFR: 89 ML/MIN/1.73M2 — SIGNIFICANT CHANGE UP
EOSINOPHIL # BLD AUTO: 0.07 K/UL — SIGNIFICANT CHANGE UP (ref 0–0.5)
EOSINOPHIL NFR BLD AUTO: 1 % — SIGNIFICANT CHANGE UP (ref 0–6)
GLUCOSE SERPL-MCNC: 100 MG/DL — HIGH (ref 70–99)
HCT VFR BLD CALC: 41.7 % — SIGNIFICANT CHANGE UP (ref 39–50)
HCT VFR BLD CALC: 43.8 % — SIGNIFICANT CHANGE UP (ref 39–50)
HGB BLD-MCNC: 13.6 G/DL — SIGNIFICANT CHANGE UP (ref 13–17)
HGB BLD-MCNC: 14.4 G/DL — SIGNIFICANT CHANGE UP (ref 13–17)
IMM GRANULOCYTES NFR BLD AUTO: 0.3 % — SIGNIFICANT CHANGE UP (ref 0–0.9)
INR BLD: 0.93 RATIO — SIGNIFICANT CHANGE UP (ref 0.85–1.18)
LYMPHOCYTES # BLD AUTO: 1.54 K/UL — SIGNIFICANT CHANGE UP (ref 1–3.3)
LYMPHOCYTES # BLD AUTO: 22.4 % — SIGNIFICANT CHANGE UP (ref 13–44)
MCHC RBC-ENTMCNC: 30.2 PG — SIGNIFICANT CHANGE UP (ref 27–34)
MCHC RBC-ENTMCNC: 30.7 PG — SIGNIFICANT CHANGE UP (ref 27–34)
MCHC RBC-ENTMCNC: 32.6 GM/DL — SIGNIFICANT CHANGE UP (ref 32–36)
MCHC RBC-ENTMCNC: 32.9 GM/DL — SIGNIFICANT CHANGE UP (ref 32–36)
MCV RBC AUTO: 92.7 FL — SIGNIFICANT CHANGE UP (ref 80–100)
MCV RBC AUTO: 93.4 FL — SIGNIFICANT CHANGE UP (ref 80–100)
MONOCYTES # BLD AUTO: 0.75 K/UL — SIGNIFICANT CHANGE UP (ref 0–0.9)
MONOCYTES NFR BLD AUTO: 10.9 % — SIGNIFICANT CHANGE UP (ref 2–14)
NEUTROPHILS # BLD AUTO: 4.45 K/UL — SIGNIFICANT CHANGE UP (ref 1.8–7.4)
NEUTROPHILS NFR BLD AUTO: 64.5 % — SIGNIFICANT CHANGE UP (ref 43–77)
NRBC # BLD: 0 /100 WBCS — SIGNIFICANT CHANGE UP (ref 0–0)
NRBC # BLD: 0 /100 WBCS — SIGNIFICANT CHANGE UP (ref 0–0)
OB PNL STL: POSITIVE
PLATELET # BLD AUTO: 270 K/UL — SIGNIFICANT CHANGE UP (ref 150–400)
PLATELET # BLD AUTO: 301 K/UL — SIGNIFICANT CHANGE UP (ref 150–400)
POTASSIUM SERPL-MCNC: 4 MMOL/L — SIGNIFICANT CHANGE UP (ref 3.5–5.3)
POTASSIUM SERPL-SCNC: 4 MMOL/L — SIGNIFICANT CHANGE UP (ref 3.5–5.3)
PROT SERPL-MCNC: 7.6 G/DL — SIGNIFICANT CHANGE UP (ref 6–8.3)
PROTHROM AB SERPL-ACNC: 10.3 SEC — SIGNIFICANT CHANGE UP (ref 9.5–13)
RBC # BLD: 4.5 M/UL — SIGNIFICANT CHANGE UP (ref 4.2–5.8)
RBC # BLD: 4.69 M/UL — SIGNIFICANT CHANGE UP (ref 4.2–5.8)
RBC # FLD: 11.8 % — SIGNIFICANT CHANGE UP (ref 10.3–14.5)
RBC # FLD: 11.9 % — SIGNIFICANT CHANGE UP (ref 10.3–14.5)
RH IG SCN BLD-IMP: POSITIVE — SIGNIFICANT CHANGE UP
SODIUM SERPL-SCNC: 137 MMOL/L — SIGNIFICANT CHANGE UP (ref 135–145)
WBC # BLD: 4.92 K/UL — SIGNIFICANT CHANGE UP (ref 3.8–10.5)
WBC # BLD: 6.89 K/UL — SIGNIFICANT CHANGE UP (ref 3.8–10.5)
WBC # FLD AUTO: 4.92 K/UL — SIGNIFICANT CHANGE UP (ref 3.8–10.5)
WBC # FLD AUTO: 6.89 K/UL — SIGNIFICANT CHANGE UP (ref 3.8–10.5)

## 2024-03-01 PROCEDURE — 86900 BLOOD TYPING SEROLOGIC ABO: CPT

## 2024-03-01 PROCEDURE — 99284 EMERGENCY DEPT VISIT MOD MDM: CPT | Mod: 25

## 2024-03-01 PROCEDURE — 86901 BLOOD TYPING SEROLOGIC RH(D): CPT

## 2024-03-01 PROCEDURE — 96374 THER/PROPH/DIAG INJ IV PUSH: CPT | Mod: XU

## 2024-03-01 PROCEDURE — 82962 GLUCOSE BLOOD TEST: CPT

## 2024-03-01 PROCEDURE — 99285 EMERGENCY DEPT VISIT HI MDM: CPT

## 2024-03-01 PROCEDURE — 85610 PROTHROMBIN TIME: CPT

## 2024-03-01 PROCEDURE — 85027 COMPLETE CBC AUTOMATED: CPT

## 2024-03-01 PROCEDURE — 74177 CT ABD & PELVIS W/CONTRAST: CPT | Mod: 26,MC

## 2024-03-01 PROCEDURE — 85025 COMPLETE CBC W/AUTO DIFF WBC: CPT

## 2024-03-01 PROCEDURE — 82272 OCCULT BLD FECES 1-3 TESTS: CPT

## 2024-03-01 PROCEDURE — 86850 RBC ANTIBODY SCREEN: CPT

## 2024-03-01 PROCEDURE — 74177 CT ABD & PELVIS W/CONTRAST: CPT | Mod: MC

## 2024-03-01 PROCEDURE — 80053 COMPREHEN METABOLIC PANEL: CPT

## 2024-03-01 PROCEDURE — 85730 THROMBOPLASTIN TIME PARTIAL: CPT

## 2024-03-01 RX ORDER — ACETAMINOPHEN 500 MG
650 TABLET ORAL ONCE
Refills: 0 | Status: COMPLETED | OUTPATIENT
Start: 2024-03-01 | End: 2024-03-01

## 2024-03-01 RX ORDER — PANTOPRAZOLE SODIUM 20 MG/1
80 TABLET, DELAYED RELEASE ORAL ONCE
Refills: 0 | Status: COMPLETED | OUTPATIENT
Start: 2024-03-01 | End: 2024-03-01

## 2024-03-01 RX ADMIN — Medication 650 MILLIGRAM(S): at 03:55

## 2024-03-01 RX ADMIN — PANTOPRAZOLE SODIUM 80 MILLIGRAM(S): 20 TABLET, DELAYED RELEASE ORAL at 03:55

## 2024-03-01 NOTE — ED CLERICAL - NS ED CLERK NOTE PRE-ARRIVAL INFORMATION; ADDITIONAL PRE-ARRIVAL INFORMATION
CC/Reason For referral: GI Bleed  Preferred Consultant(if applicable): N/A  Who admits for you (if needed): N/A  Do you have documents you would like to fax over? N/A  Would you still like to speak to an ED attending? Yes

## 2024-03-01 NOTE — ED PROVIDER NOTE - OBJECTIVE STATEMENT
52 yo M w/ PMHx of HTN, palpitations, anxiety, multiple spinal herniations s/p epidural injection and laminectomy c/b sepsis presents for 2 episodes of rectal bleeding in the setting of endoscopy/colonoscopy with multiple polyp removals earlier yesterday.  Patient has been intermittent abdominal/back pain since the beginning 2023.  3 months ago, patient had an MRI.  Shortly after, patient started noticing stool caliber changes.  He had a endoscopy/colonoscopy on 2/29/2024 ([facility], Dr. Martinez) with 1 gastric sessile polyp, 4 sigmoid colon sessile polyps, and 1 x 9 mm pedunculated rectal polyp removed (all benign on preliminary exam).  He started to have mild abdominal pain after the procedure.  In the afternoon, patient had bright red rectal bleeding with no stool.  A few hours later, patient passed gas and noticed bright red blood leaking through her underwear and sheets.  He spoke with on-call GI doctor (Dr. Martinez) who asked him to present to ED. He did not take his metoprolol/ACE inhibitor today morning, but took it later in the night. He denies any fever/chills/cough/sore throat, CP, SOB, nausea/vomiting, fall/abdominal trauma, or  symptoms.    Gastroenterologist: Dr. Benito Martinez

## 2024-03-01 NOTE — ED PROVIDER NOTE - NSFOLLOWUPINSTRUCTIONS_ED_ALL_ED_FT
Follow up with your Gastroenterologist Call the Emergency Department if you have difficulties getting your appointment.    Immediately return to the Emergency Department for any new or markedly worsening symptoms.    Follow up with your outpatient doctors regarding the incidental findings noted on your CT, which include:   - Right hepatic cyst   - Left renal stone  - Left kidney lesion  - Falciform ligament lesion   - Lumbar scoliosis and degenerative disc disease     A full Copy of your CT report is included in this study.

## 2024-03-01 NOTE — ED PROVIDER NOTE - PHYSICAL EXAMINATION
GENERAL: no acute distress, mesomorphic body habitus  HEENT: atraumatic, normocephalic, vision grossly intact, EOMI, no conjunctivitis or discharge, hearing grossly intact, no nasal discharge or epistaxis, clear pharynx  CV: regular rate, normal rhythm, normal S1/S2, no murmurs/rubs, no cyanosis  PULM: normal work of breathing, normal O2 saturation on RA, clear breath sounds in b/l upper/lower lung fields, no crackles/rales/rhonchi/wheezing  GI: mild abdominal TTP, soft/nondistended abdomen, no guarding or rebound tenderness, no palpable masses  RECTAL: rectal skin tag, aleta bright red blood w/ digital rectal exam - Chaperoned by Nurse Fanny  : no CVA tenderness  NEURO: A&Ox4, follows commands, normal speech, no focal motor or sensory deficits  MSK: no joint tenderness/swelling/erythema, ranging all extremities with no appreciable loss of ROM  EXT: no peripheral edema, no calf tenderness, no redness or swelling  SKIN: warm, dry, and intact, no rashes  PSYCH: appropriate mood and affect

## 2024-03-01 NOTE — ED PROVIDER NOTE - IV ALTEPLASE ADMIN OUTSIDE HIDDEN
Does he want the prescriptions sent to a particular pharmacy? We should be able to send electronically and not by fax.   show

## 2024-03-01 NOTE — CONSULT NOTE ADULT - ASSESSMENT
new onset rectal bleeding. likely postpolypectomy bleeding.   likely resolved. hgb stable. BP stable,.   given stability with no further bleeding favor against repeat flexible sigmoidoscopy  await ct scan results  repeat cbc  if no further bleeding and hgb stable and ct normal can discharge home with f/u in my office next week. clear liquid diet for 24 hours upon discharge  if evidence of rebleeding to undergo flexible sigmoidoscopy today  patient understands and agrees with plan discussed with care team as well

## 2024-03-01 NOTE — ED ADULT NURSE NOTE - OBJECTIVE STATEMENT
54 y/o male complaining of rectal bleeding. Currently endorsing rectal bleeding and abdominal pain. Pt had colonoscopy and endoscopy today an [facility] for high risk surveillance, was found to have multiple polyps. States the bleeding began approx. 1800 and around 2300 he had another episode with "lots of blood". Bright red blood present on exam. Pt A&ox4, ambulatory, speaking in complete sentences on RA. Denies chest pain, SOB, dizziness, HA, fever, chills. 18G RAC IV inserted and labs sent. VSS. Safety and comfort measures maitnained.

## 2024-03-01 NOTE — ED PROVIDER NOTE - ATTENDING CONTRIBUTION TO CARE
Patient presents due to bright red blood per rectum and lower abdominal pain status post  having a colonoscopy yesterday.  On exam patient is well-appearing, afebrile, normal heart rate, unremarkable blood pressure, mild lower abdominal tenderness without guarding or rebound.  gross blood on rectal exam. Labs were sent to rule out acute blood loss anemia  Showed hemoglobin within normal limits.  Labs otherwise nonactionable.  As per request of his GI doctor, we will keep her in the ED for him to come see him for evaluation and further recommendations.  CT obtained to rule out perforation or other acute complication from the colonoscopy.

## 2024-03-01 NOTE — ED PROVIDER NOTE - NSDCPRINTRESULTS_ED_ALL_ED
Area H Indication Text: Tumors in this location are included in Area H (eyelids, eyebrows, nose, lips, chin, ear, pre-auricular, post-auricular, temple, genitalia, hands, feet, ankles and areola).  Tissue conservation is critical in these anatomic locations. Patient requests all Lab, Cardiology, and Radiology Results on their Discharge Instructions

## 2024-03-01 NOTE — ED ADULT NURSE NOTE - NSFALLUNIVINTERV_ED_ALL_ED
Bed/Stretcher in lowest position, wheels locked, appropriate side rails in place/Call bell, personal items and telephone in reach/Instruct patient to call for assistance before getting out of bed/chair/stretcher/Non-slip footwear applied when patient is off stretcher/[place] to call system/Physically safe environment - no spills, clutter or unnecessary equipment/Purposeful proactive rounding/Room/bathroom lighting operational, light cord in reach

## 2024-03-01 NOTE — ED ADULT NURSE NOTE - CHIEF COMPLAINT QUOTE
colonscopy and endoscopy today an [facility] for high risk surveillance; comes in for rectal bleeding  and abdominal pain; noted began about 1800, then at about 2300 had an episode of "large amt of blood"; pmh sepsis; not on blood thinners

## 2024-03-01 NOTE — ED PROVIDER NOTE - PROGRESS NOTE DETAILS
Vicky PGY2: Labs remarkable for positive fecal occult blood.  Plan for CT abdomen/pelvis due to worsening abdominal pain.  Plan for GI evaluation with Dr. Benito Martinez to follow-up in morning.  GI emailed. ap- pt signed out to me post polypectomy GI bleed, pt w/ multiple polyps resected on 2/29, pt reports 2 episodes of brbpr at home w/ mild abd pain, pt pending GI to see, pt w/ soft abdomen, pt w/ no evidence of clots on underwear, dried blood

## 2024-03-01 NOTE — ED PROVIDER NOTE - CLINICAL SUMMARY MEDICAL DECISION MAKING FREE TEXT BOX
54 yo M w/ PMHx of HTN, palpitations, anxiety, multiple spinal herniations s/p epidural injection and laminectomy c/b sepsis presents for 2 episodes of rectal bleeding in the setting of endoscopy/colonoscopy with multiple polyp removals earlier yesterday.  Vital signs are unremarkable.  Physical exam is remarkable for rectal skin tag, aleta bright red blood w/ digital rectal exam, and mild abdominal TTP.  Concern for rectal bleeding secondary to instrumentation with low concern for perforation.  Also low concern for anemia secondary to blood loss.  Plan for labs, Protonix, pain control.

## 2024-03-01 NOTE — ED PROVIDER NOTE - PATIENT PORTAL LINK FT
You can access the FollowMyHealth Patient Portal offered by [facility] by registering at the following website: [email or web address]. By joining [employer]’s FollowMyHealth portal, you will also be able to view your health information using other applications (apps) compatible with our system.

## 2024-03-01 NOTE — ED ADULT TRIAGE NOTE - CHIEF COMPLAINT QUOTE
colonscopy and endoscopy today an [facility] for high risk surveillance; comes in for rectal bleeding  and abdominal pain; noted began about 1800, then at about 2300 had an episode of "large amt of blood"; pmh sepsis; not on blood thinners

## 2024-03-01 NOTE — CONSULT NOTE ADULT - SUBJECTIVE AND OBJECTIVE BOX
Patient is a 53y old  Male who presents with rectal bleeding for 3 hours following a colonoscopy yesterday. Reports red blood with clots. soaked through his underwear and onto his sheets. even with wiping would constantly have blod on his toilet paper.  had a 9mm rectal pedunculated polyp. s/p cold snare polypectomy. yesterday  internal hemorrhoids. additional smaller polyps removed and random biopsies obtained  EGD with fundus polyp removed as well  since ER evaluation no further bleeding  reports lower abdominal bloating and pain  has a history of abdominal pain for many years favored to be functional  denies lightheadedness dizziness or chest pressure    PAST MEDICAL & SURGICAL HISTORY:  Concussion  x 12,  in the past all from sports related injuries      Renal Calculi  2011 had a cystoscopy and since then has passed 2 on his own      Anxiety      S/P epidural steroid injection  multiple, last August 8/10/22      Hypertension      Cervical herniated disc  x5, C3-4, C4-5, C5-6, C6-7, C7-T1      Thoracic disc herniation  x2, T7 and T8 and disc bulge at T11      Lumbar herniated disc  x5, L1-2. L2-3, L3-4, L4-5 and L5-S1, medrol dose pack June 2022      DDD (degenerative disc disease), lumbar      History of scoliosis      History of 2019 novel coronavirus disease (COVID-19)  December 2020 and December 2021, mild symptoms      History of vertigo      History of headache      Post concussion syndrome      History of thyroid nodule  followed with ultrasound      Elevated liver enzymes  history of, related to percocet use      Liver cyst  incidental finding      Umbilical hernia      Osteoarthritis      History of skin cancer  squamos cell      History of cystoscopy  stone extraction 2011      H/O right inguinal hernia repair  1995      H/O arthroscopy of right knee  2020      H/O arthroscopy of left knee  2021      History of Mohs surgery for squamous cell carcinoma of skin  x2 April 2021 forehead and April 2022 left hand          MEDICATIONS  (STANDING):    MEDICATIONS  (PRN):      Allergies    No Known Allergies    Intolerances        Review of Systems:    General:  No wt loss, fevers, chills, night sweats,fatigue,   CV:  No pain, palpitations, hypo/hypertension  Resp:  No dyspnea, cough, tachypnea, wheezing  GI:  abdominal pain, No nausea, No vomiting, No diarrhea, No constipatiion, No weight loss, No fever, No pruritis, + rectal bleeding, No tarry stools, No dysphagia,  :  No pain, bleeding, incontinence, nocturia  Muscle:  No pain, weakness  Neuro:  No weakness, tingling, memory problems  Psych:  No fatigue, insomnia, mood problems, depression  Endocrine:  No polyuria, polydypsia, cold/heat intolerance  Heme:  No petechiae, ecchymosis, easy bruisability  Skin:  No rash, tattoos, scars, edema      Vital Signs Last 24 Hrs  T(C): 36.6 (01 Mar 2024 06:20), Max: 36.6 (01 Mar 2024 06:20)  T(F): 97.9 (01 Mar 2024 06:20), Max: 97.9 (01 Mar 2024 06:20)  HR: 59 (01 Mar 2024 06:20) (59 - 76)  BP: 115/77 (01 Mar 2024 06:20) (115/77 - 128/94)  BP(mean): --  RR: 17 (01 Mar 2024 06:20) (17 - 20)  SpO2: 100% (01 Mar 2024 06:20) (100% - 100%)    Parameters below as of 01 Mar 2024 06:20  Patient On (Oxygen Delivery Method): room air        PHYSICAL EXAM:    Constitutional: NAD, well-developed  Neck: No LAD, supple  Respiratory: CTA and P  Cardiovascular: S1 and S2, RRR, no M  Gastrointestinal: BS+, soft,mild lower abdominal tenderness, neg HSM,  Extremities: No peripheral edema, neg clubing, cyanosis  Vascular: 2+ peripheral pulses  Neurological: A/O x 3, no focal deficits  Psychiatric: Normal mood, normal affect  Skin: No rashes        LABS:                        14.4   6.89  )-----------( 301      ( 01 Mar 2024 04:12 )             43.8     03-01    137  |  100  |  21  ----------------------------<  100<H>  4.0   |  21<L>  |  1.00    Ca    9.6      01 Mar 2024 04:12    TPro  7.6  /  Alb  4.8  /  TBili  1.5<H>  /  DBili  x   /  AST  19  /  ALT  25  /  AlkPhos  94  03-01    PT/INR - ( 01 Mar 2024 04:12 )   PT: 10.3 sec;   INR: 0.93 ratio         PTT - ( 01 Mar 2024 04:12 )  PTT:32.8 sec  Urinalysis Basic - ( 01 Mar 2024 04:12 )    Color: x / Appearance: x / SG: x / pH: x  Gluc: 100 mg/dL / Ketone: x  / Bili: x / Urobili: x   Blood: x / Protein: x / Nitrite: x   Leuk Esterase: x / RBC: x / WBC x   Sq Epi: x / Non Sq Epi: x / Bacteria: x      LIVER FUNCTIONS - ( 01 Mar 2024 04:12 )  Alb: 4.8 g/dL / Pro: 7.6 g/dL / ALK PHOS: 94 U/L / ALT: 25 U/L / AST: 19 U/L / GGT: x             RADIOLOGY & ADDITIONAL TESTS:

## 2024-04-10 NOTE — ED PROVIDER NOTE - CONSTITUTIONAL, MLM
Well appearing, awake, alert, oriented to person, place, time/situation and in no apparent distress. (4) walks frequently normal...

## 2024-08-23 NOTE — ED ADULT TRIAGE NOTE - PAIN: PRESENCE, MLM
Requesting previous gastroenterology records for patient:      Who requested records: Maria Esther LEO   Date and time requested: 8- @ 2593   Why records requested: Not obtained prior to appointment   What records have been requested:  colonoscopy record including pathology reports s from [facility]    When records received:  8-23-24 @ 1407       Facility:     [facility]  [address] [facility]  [id number] (use the outside entrance)  [address]    Ph: [phone]       Munira Saeed LPN       complains of pain/discomfort

## (undated) DEVICE — SPONGE PEANUT AUTO COUNT

## (undated) DEVICE — GOWN TRIMAX LG

## (undated) DEVICE — SUT POLYSORB 3-0 30" V-20 UNDYED

## (undated) DEVICE — DRAPE 3/4 SHEET 52X76"

## (undated) DEVICE — WRAP COMPRESSION CALF MED

## (undated) DEVICE — BLANKET WARMER UPPER ADULT

## (undated) DEVICE — GLV 6.5 PROTEXIS

## (undated) DEVICE — SUT POLYSORB 2-0 60" REEL

## (undated) DEVICE — SUT SURGIPRO II 0 30" GS-21

## (undated) DEVICE — GLV 7 ESTEEM BLUE

## (undated) DEVICE — SUT DERMABOND 0.7ML

## (undated) DEVICE — SOL IRR POUR H2O 1000ML

## (undated) DEVICE — SOL IRR POUR NS 0.9% 1000ML

## (undated) DEVICE — PACK MINOR NO DRAPE

## (undated) DEVICE — DRAPE CHEST BREAST 106X122"

## (undated) DEVICE — SUT BIOSYN 4-0 27" P-12

## (undated) DEVICE — SUT SURGIPRO 0 30" GS-22

## (undated) DEVICE — GLV 7 PROTEXIS